# Patient Record
Sex: FEMALE | Race: WHITE | Employment: OTHER
[De-identification: names, ages, dates, MRNs, and addresses within clinical notes are randomized per-mention and may not be internally consistent; named-entity substitution may affect disease eponyms.]

---

## 2017-01-06 ENCOUNTER — HEALTH MAINTENANCE LETTER (OUTPATIENT)
Age: 57
End: 2017-01-06

## 2017-01-31 ENCOUNTER — OFFICE VISIT (OUTPATIENT)
Dept: FAMILY MEDICINE | Facility: CLINIC | Age: 57
End: 2017-01-31

## 2017-01-31 DIAGNOSIS — M70.41 PREPATELLAR BURSITIS OF RIGHT KNEE: Primary | ICD-10-CM

## 2017-01-31 DIAGNOSIS — S80.211A ABRASION, KNEE, RIGHT, INITIAL ENCOUNTER: ICD-10-CM

## 2017-01-31 LAB
% GRANULOCYTES: 64.3 %
HCT VFR BLD AUTO: 44.1 % (ref 35–47)
HEMOGLOBIN: 13.9 G/DL (ref 11.7–15.7)
LYMPHOCYTES NFR BLD AUTO: 26.4 %
MCH RBC QN AUTO: 28.8 PG (ref 26–33)
MCHC RBC AUTO-ENTMCNC: 31.5 G/DL (ref 31–36)
MCV RBC AUTO: 91.5 FL (ref 78–100)
MONOCYTES NFR BLD AUTO: 9.3 %
PLATELET COUNT - QUEST: 329 10^9/L (ref 150–375)
RBC # BLD AUTO: 4.82 10*12/L (ref 3.8–5.2)
WBC # BLD AUTO: 7.2 10*9/L (ref 4–11)

## 2017-01-31 PROCEDURE — 36415 COLL VENOUS BLD VENIPUNCTURE: CPT | Performed by: FAMILY MEDICINE

## 2017-01-31 PROCEDURE — 85025 COMPLETE CBC W/AUTO DIFF WBC: CPT | Performed by: FAMILY MEDICINE

## 2017-01-31 PROCEDURE — 99214 OFFICE O/P EST MOD 30 MIN: CPT | Performed by: FAMILY MEDICINE

## 2017-01-31 NOTE — Clinical Note
[facility]              [facility]  [place] [facility] 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 Swelling and tenderness noted after the activity. Improved today    Prior history of related problems: previous knee injury / hit it when she fell and scraped the skin. 1/21/2017    EXAMINATION:                      Skin: healing abrasion without erythema    Musculoskeletal: Knee is noted on the right for a 1+ bursitis with mild erythema. Palpation without evidence of warmth. ROM is full. No ligamentous instability. Patellar apprehension negative.  Neurologic: negative      CBC; WNL    Diagnosis: (M70.41) Prepatellar bursitis of right knee  (primary encounter diagnosis)  Ice prn. Rehab stretches/ exercises to begin immediately and given in writing with illustrations.  No kneeling for one month. No squatting    (S80.211A) Abrasion, knee, right, initial encounter  Plan: CL AFF HEMOGRAM/PLATE/DIFF (BFP), VENOUS         COLLECTION        Gentle continued skin care        Work Related:  yes    Lab Results: as above    The employee is ABLE to return to work today.    When the patient returns to work, the following restrictions apply until 1 month:  A) No weight on the right knee  B: No squatting      Other restrictions: None    FOLLOW-UP  No follow up is necessary.  Permanency rating 0 %.    Erika Cavanaugh MD      I authorize the release of medical information to be sent to my employer.  Employee Signature: _________________________________________  Date: ______________________________

## 2017-02-01 NOTE — NURSING NOTE
Wen is here for WC knee pain right knee    Pre-Visit Screening :  Immunizations : up to date  Colonoscopy : is up to date  Mammogram : is up to date  Asthma Action Test/Plan : NA  PHQ9/GAD7 :  NA  BP done on the left arm, with a reg sized cuff.  Pulse - regular  My Chart - accepts    CLASSIFICATION OF OVERWEIGHT AND OBESITY BY BMI                         Obesity Class           BMI(kg/m2)  Underweight                                    < 18.5  Normal                                         18.5-24.9  Overweight                                     25.0-29.9  OBESITY                     I                  30.0-34.9                              II                 35.0-39.9  EXTREME OBESITY             III                >40                             Patient's  BMI Body mass index is 23.6 kg/(m^2).  http://hin.nhlbi.nih.gov/menuplanner/menu.cgi  Questioned patient about current smoking habits.  Pt. has never smoked.

## 2017-02-01 NOTE — PROGRESS NOTES
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 Swelling and tenderness noted after the activity. Improved today    Prior history of related problems: previous knee injury / hit it when she fell and scraped the skin. 1/21/2017    EXAMINATION:                      Skin: healing abrasion without erythema    Musculoskeletal: Knee is noted on the right for a 1+ bursitis with mild erythema. Palpation without evidence of warmth. ROM is full. No ligamentous instability. Patellar apprehension negative.  Neurologic: negative      CBC; WNL    Diagnosis: (M70.41) Prepatellar bursitis of right knee  (primary encounter diagnosis)  Ice prn. Rehab stretches/ exercises to begin immediately and given in writing with illustrations.  No kneeling for one month. No squatting    (S80.211A) Abrasion, knee, right, initial encounter  Plan: CL AFF HEMOGRAM/PLATE/DIFF (BFP), VENOUS         COLLECTION        Gentle continued skin care        Work Related:  yes    Lab Results: as above    The employee is ABLE to return to work today.    When the patient returns to work, the following restrictions apply until 1 month:  A) No weight on the right knee  B: No squatting      Other restrictions: None    FOLLOW-UP  No follow up is necessary.  Permanency rating 0 %.    Erika Cavanaugh MD      I authorize the release of medical information to be sent to my employer.  Employee Signature: _________________________________________  Date: ______________________________

## 2017-02-08 VITALS
SYSTOLIC BLOOD PRESSURE: 110 MMHG | OXYGEN SATURATION: 97 % | WEIGHT: 139.6 LBS | RESPIRATION RATE: 16 BRPM | HEART RATE: 61 BPM | DIASTOLIC BLOOD PRESSURE: 78 MMHG | BODY MASS INDEX: 23.26 KG/M2 | HEIGHT: 65 IN | TEMPERATURE: 98.1 F

## 2017-02-14 ENCOUNTER — OFFICE VISIT (OUTPATIENT)
Dept: FAMILY MEDICINE | Facility: CLINIC | Age: 57
End: 2017-02-14

## 2017-02-14 VITALS
DIASTOLIC BLOOD PRESSURE: 84 MMHG | WEIGHT: 137 LBS | BODY MASS INDEX: 22.82 KG/M2 | HEIGHT: 65 IN | TEMPERATURE: 97.5 F | OXYGEN SATURATION: 97 % | HEART RATE: 49 BPM | SYSTOLIC BLOOD PRESSURE: 130 MMHG

## 2017-02-14 DIAGNOSIS — Z00.00 ROUTINE GENERAL MEDICAL EXAMINATION AT A HEALTH CARE FACILITY: Primary | ICD-10-CM

## 2017-02-14 DIAGNOSIS — Z11.59 NEED FOR HEPATITIS C SCREENING TEST: ICD-10-CM

## 2017-02-14 PROCEDURE — 99396 PREV VISIT EST AGE 40-64: CPT | Performed by: FAMILY MEDICINE

## 2017-02-14 PROCEDURE — 80061 LIPID PANEL: CPT | Mod: 90 | Performed by: FAMILY MEDICINE

## 2017-02-14 PROCEDURE — 86803 HEPATITIS C AB TEST: CPT | Mod: 90 | Performed by: FAMILY MEDICINE

## 2017-02-14 PROCEDURE — 80053 COMPREHEN METABOLIC PANEL: CPT | Mod: 90 | Performed by: FAMILY MEDICINE

## 2017-02-14 PROCEDURE — 36415 COLL VENOUS BLD VENIPUNCTURE: CPT | Performed by: FAMILY MEDICINE

## 2017-02-14 NOTE — PROGRESS NOTES
SUBJECTIVE:     CC: Wen Sanchez is an 56 year old woman who presents for preventive health visit.     Healthy Habits:    Do you get at least three servings of calcium containing foods daily (dairy, green leafy vegetables, etc.)? yes    Amount of exercise or daily activities, outside of work: 1 day(s) per week    Problems taking medications regularly No    Medication side effects: No    Have you had an eye exam in the past two years? yes    Do you see a dentist twice per year? yes    Do you have sleep apnea, excessive snoring or daytime drowsiness?no            Today's PHQ-2 Score:   PHQ-2 ( 1999 [employer]) 2/14/2017 11/5/2015   Q1: Little interest or pleasure in doing things 0 0   Q2: Feeling down, depressed or hopeless 0 0   PHQ-2 Score 0 0       Abuse: Current or Past(Physical, Sexual or Emotional)- No  Do you feel safe in your environment - Yes    Social History   Substance Use Topics     Smoking status: Never Smoker     Smokeless tobacco: Never Used     Alcohol use 2.0 oz/week     4 drink(s) per week      Comment: weekends      The patient does not drink >3 drinks per day nor >7 drinks per week.    Recent Labs   Lab Test  11/05/15   0918  08/29/13   0948   CHOL  230*  188   HDL  95  67   LDL  124  105   TRIG  57  79   CHOLHDLRATIO  2.4  2.8       Reviewed orders with patient.  Reviewed health maintenance and updated orders accordingly - Yes    Mammo Decision Support:  Patient over age 50, mutual decision to screen reflected in health maintenance.    Pertinent mammograms are reviewed under the imaging tab.  History of abnormal Pap smear: NO - age 30- 65 PAP every 3 years recommended  All Histories reviewed and updated in Epic.  Past Medical History   Diagnosis Date     Family history of diabetes mellitus      Mother and Father      Past Surgical History   Procedure Laterality Date     Test not found  10/2001     Normal GBUS except for liver hemangioma     C enlarge breast  9/01      ugi endoscopy diag w or  w/o brush/wash  2001     Dr. Brigette HUYNH mammogram, screening  2012     Eye exam established pt  2013     Colonoscopy  2010       ROS:  C: NEGATIVE for fever, chills, change in weight  I: NEGATIVE for worrisome rashes, moles or lesions  E: NEGATIVE for vision changes or irritation  ENT: NEGATIVE for ear, mouth and throat problems  R: NEGATIVE for significant cough or SOB  B: NEGATIVE for masses, tenderness or discharge  CV: NEGATIVE for chest pain, palpitations or peripheral edema  GI: NEGATIVE for nausea, abdominal pain, heartburn, or change in bowel habits  : NEGATIVE for unusual urinary or vaginal symptoms. No vaginal bleeding.  M: NEGATIVE for significant arthralgias or myalgia  N: NEGATIVE for weakness, dizziness or paresthesias  E: NEGATIVE for temperature intolerance, skin/hair changes  P: NEGATIVE for changes in mood or affect     Problem list, Medication list, Allergies, and Medical/Social/Surgical histories reviewed in [facility] and updated as appropriate.  Labs reviewed in EPIC  BP Readings from Last 3 Encounters:   02/14/17 130/84   01/31/17 110/78   06/23/16 126/84    Wt Readings from Last 3 Encounters:   02/14/17 62.1 kg (137 lb)   01/31/17 63.3 kg (139 lb 9.6 oz)   06/23/16 62 kg (136 lb 9.6 oz)                  Patient Active Problem List   Diagnosis     Panic disorder without agoraphobia     Esophageal reflux     Hypoglycemia     Undiagnosed cardiac murmurs     Calculus of kidney     Counseling for living will     Health Care Home     Encounter for counseling     ACP (advance care planning)     Symptomatic menopausal or female climacteric states     Past Surgical History   Procedure Laterality Date     Test not found  10/2001     Normal GBUS except for liver hemangioma     C enlarge breast  9/01      ugi endoscopy diag w or w/o brush/wash  2001     Dr. Brigette HUYNH mammogram, screening  2012     Eye exam established pt  2013     Colonoscopy  2010       Social History   Substance Use Topics     Smoking  "status: Never Smoker     Smokeless tobacco: Never Used     Alcohol use 2.0 oz/week     4 drink(s) per week      Comment: weekends      Family History   Problem Relation Age of Onset     DIABETES Mother      CANCER Sister      cervical     HEART DISEASE No family hx of      Lipids No family hx of      DIABETES Father      Breast Cancer No family hx of      Cancer - colorectal No family hx of          Current Outpatient Prescriptions   Medication Sig Dispense Refill     esomeprazole (NEXIUM) 40 MG capsule TAKE 1 CAPSULE EVERY  MORNING BEFORE BREAKFAST;  TAKE 30-60 MINUTES BEFORE  EATING. 90 capsule 3     EPI E-Z PEN QUIRINO 1:1000  IJ 1 TIME ONLY 1 2     Allergies   Allergen Reactions     Sesame Oil Anaphylaxis     Cephalosporins      Recent Labs   Lab Test  11/05/15   0918  08/29/13   0948  06/13/12   0848   05/19/11   0500  11/13/09   0400   LDL  124  105  91   < >   --    --    HDL  95  67  66   < >   --    --    TRIG  57  79  84   < >   --    --    ALT  20  21  20   < >   --    --    CR  0.73  0.69  0.76   < >   --    --    GFRESTIMATED  91  98  90   < >   --    --    POTASSIUM  4.1  4.4  4.0   < >   --    --    TSH   --    --    --    --   1.06  1.27    < > = values in this interval not displayed.      OBJECTIVE:     /84 (BP Location: Left arm, Cuff Size: Adult Regular)  Pulse (!) 49  Temp 97.5  F (36.4  C) (Oral)  Ht 1.638 m (5' 4.5\")  Wt 62.1 kg (137 lb)  LMP 10/14/2013  SpO2 97%  BMI 23.15 kg/m2  EXAM:  GENERAL: healthy, alert and no distress  EYES: Eyes grossly normal to inspection, PERRL and conjunctivae and sclerae normal  HENT: ear canals and TM's normal, nose and mouth without ulcers or lesions  NECK: no adenopathy, no asymmetry, masses, or scars and thyroid normal to palpation  RESP: lungs clear to auscultation - no rales, rhonchi or wheezes  BREAST: normal without masses, tenderness or nipple discharge and no palpable axillary masses or adenopathy  CV: regular rate and rhythm, normal S1 S2, no S3 or " "S4, no murmur, click or rub, no peripheral edema and peripheral pulses strong  ABDOMEN: soft, nontender, no hepatosplenomegaly, no masses and bowel sounds normal   (female): deferred  MS: no gross musculoskeletal defects noted, no edema  SKIN: no suspicious lesions or rashes  NEURO: Normal strength and tone, mentation intact and speech normal  PSYCH: mentation appears normal, affect normal/bright  LYMPH: no cervical, supraclavicular, axillary, or inguinal adenopathy    ASSESSMENT/PLAN:     (Z00.00) Routine general medical examination at a health care facility  (primary encounter diagnosis)  Comment: discussed preventitive healthcare   Plan: Lipid Profile ([facility]), COMPREHENSIVE METABOLIC         PANEL ([facility]) XCMP, VENOUS COLLECTION        Continue to work on healthy diet and exercise, discussed healthy habits     (Z11.59) Need for hepatitis C screening test  Comment:   Plan: Hepatits C antibody ([facility]), VENOUS COLLECTION              COUNSELING:   Reviewed preventive health counseling, as reflected in patient instructions       Regular exercise       Healthy diet/nutrition       Vision screening       Colon cancer screening       Consider Hep C screening for patients born between 1945 and 1965         reports that she has never smoked. She has never used smokeless tobacco.    Estimated body mass index is 23.15 kg/(m^2) as calculated from the following:    Height as of this encounter: 1.638 m (5' 4.5\").    Weight as of this encounter: 62.1 kg (137 lb).       Counseling Resources:  ATP IV Guidelines  Pooled Cohorts Equation Calculator  Breast Cancer Risk Calculator  FRAX Risk Assessment  ICSI Preventive Guidelines  Dietary Guidelines for Americans, 2010  Dot's MyPlate  ASA Prophylaxis  Lung CA Screening    Mike Beltran MD  [facility]  "

## 2017-02-14 NOTE — NURSING NOTE
"Wen Sanchez is here for a CPX. Fasting: Yes.    Pre-visit Planning  Immunizations -up to date  Colonoscopy -is up to date  Mammogram -is up to date  Asthma test --NA  PHQ2 -is completed today  CLAUDIA 7 -NA    Vitals:  BP Cuff left  Arm with regular Cuff  PULSE regular  137 lbs 0 oz and 5' 4.5\"  CLASSIFICATION OF OVERWEIGHT AND OBESITY BY BMI                        Obesity Class           BMI(kg/m2)  Underweight                                    < 18.5  Normal                                         18.5-24.9  Overweight                                     25.0-29.9  OBESITY                     I                  30.0-34.9                             II                 35.0-39.9  EXTREME OBESITY             III                >40      Patient's  BMI Body mass index is 23.15 kg/(m^2).  Http://hin.nhlbi.nih.gov/menuplanner/menu.cgi  Tobacco Use:  Questioned patient about current smoking habits.  Pt. has never smoked.  ETOH screening Questions:  1-How often do you have a drink containing alcohol?                             2 times per week(s)  2-How many drinks containing alcohol do you have on a typical day when you are         Drinking?                              2 to 3  3- How often do you have 5 or more drinks on one occasion?                              Never     Have you ever:  @None of the patient's responses to the CAGE screening were positive / Negative CAGE score@    Roomed by: Juany Ferguson CMA      "

## 2017-02-14 NOTE — MR AVS SNAPSHOT
After Visit Summary   2/14/2017    Wen Sanchez    MRN: [id number]           Patient Information     Date Of Birth          1960        Visit Information        Provider Department      2/14/2017 9:30 AM Mike Beltran MD [facility]        Today's Diagnoses     Routine general medical examination at a health care facility    -  1    Need for hepatitis C screening test           Follow-ups after your visit        Follow-up notes from your care team     Return in about 1 year (around 2/14/2018).      Who to contact     If you have questions or need follow up information about today's clinic visit or your schedule please contact [facility] directly at [phone].  Normal or non-critical lab and imaging results will be communicated to you by Zeviahart, letter or phone within 4 business days after the clinic has received the results. If you do not hear from us within 7 days, please contact the clinic through Zeviahart or phone. If you have a critical or abnormal lab result, we will notify you by phone as soon as possible.  Submit refill requests through "Carmolex," or call your pharmacy and they will forward the refill request to us. Please allow 3 business days for your refill to be completed.          Additional Information About Your Visit        MyChart Information     "Carmolex," gives you secure access to your electronic health record. If you see a primary care provider, you can also send messages to your care team and make appointments. If you have questions, please call your primary care clinic.  If you do not have a primary care provider, please call [phone] and they will assist you.        Care EveryWhere ID     This is your Care EveryWhere ID. This could be used by other organizations to access your [facility] medical records  [id number]        Your Vitals Were     Pulse Temperature Height Last Period Pulse Oximetry BMI (Body Mass  "Index)    49 97.5  F (36.4  C) (Oral) 1.638 m (5' 4.5\") 10/14/2013 97% 23.15 kg/m2       Blood Pressure from Last 3 Encounters:   02/14/17 130/84   01/31/17 110/78   06/23/16 126/84    Weight from Last 3 Encounters:   02/14/17 62.1 kg (137 lb)   01/31/17 63.3 kg (139 lb 9.6 oz)   06/23/16 62 kg (136 lb 9.6 oz)              We Performed the Following     COMPREHENSIVE METABOLIC PANEL ([facility]) XCMP     Hepatits C antibody ([facility])     Lipid Profile ([facility])     VENOUS COLLECTION        Primary Care Provider Office Phone # Fax #    Mike Beltran [phone]       [facility] [address]  [facility] [address]        Thank you!     Thank you for choosing [facility]  for your care. Our goal is always to provide you with excellent care. Hearing back from our patients is one way we can continue to improve our services. Please take a few minutes to complete the written survey that you may receive in the mail after your visit with us. Thank you!             Your Updated Medication List - Protect others around you: Learn how to safely use, store and throw away your medicines at www.disposemymeds.org.          This list is accurate as of: 2/14/17 10:06 AM.  Always use your most recent med list.                   Brand Name Dispense Instructions for use    EPI E-Z PEN QUIRINO 1:1000  IJ     1    1 TIME ONLY       esomeprazole 40 MG CR capsule    nexIUM    90 capsule    TAKE 1 CAPSULE EVERY  MORNING BEFORE BREAKFAST;  TAKE 30-60 MINUTES BEFORE  EATING.         "

## 2017-02-15 LAB
ALBUMIN SERPL-MCNC: 4.4 G/DL (ref 3.6–5.1)
ALBUMIN/GLOB SERPL: 1.4 (CALC) (ref 1–2.5)
ALP SERPL-CCNC: 96 U/L (ref 33–130)
ALT SERPL-CCNC: 17 U/L (ref 6–29)
AST SERPL-CCNC: 18 U/L (ref 10–35)
BILIRUB SERPL-MCNC: 0.5 MG/DL (ref 0.2–1.2)
BUN SERPL-MCNC: 26 MG/DL (ref 7–25)
BUN/CREATININE RATIO: 36 (CALC) (ref 6–22)
CALCIUM SERPL-MCNC: 9.4 MG/DL (ref 8.6–10.4)
CHLORIDE SERPLBLD-SCNC: 105 MMOL/L (ref 98–110)
CHOLEST SERPL-MCNC: 229 MG/DL (ref 125–200)
CHOLEST/HDLC SERPL: 2.5 (CALC)
CO2 SERPL-SCNC: 22 MMOL/L (ref 20–31)
CREAT SERPL-MCNC: 0.72 MG/DL (ref 0.5–1.05)
EGFR AFRICAN AMERICAN - QUEST: 107 ML/MIN/1.73M2
GFR SERPL CREATININE-BSD FRML MDRD: 92 ML/MIN/1.73M2
GLOBULIN, CALCULATED - QUEST: 3.1 G/DL (CALC) (ref 1.9–3.7)
GLUCOSE - QUEST: 77 MG/DL (ref 65–99)
HCV AB - QUEST: NORMAL
HDLC SERPL-MCNC: 90 MG/DL
LDLC SERPL CALC-MCNC: 127 MG/DL (CALC)
NONHDLC SERPL-MCNC: 139 MG/DL (CALC)
POTASSIUM SERPL-SCNC: 4 MMOL/L (ref 3.5–5.3)
PROT SERPL-MCNC: 7.5 G/DL (ref 6.1–8.1)
SIGNAL TO CUT OFF - QUEST: 0.03
SODIUM SERPL-SCNC: 140 MMOL/L (ref 135–146)
TRIGL SERPL-MCNC: 62 MG/DL

## 2017-02-25 ENCOUNTER — APPOINTMENT (OUTPATIENT)
Dept: GENERAL RADIOLOGY | Facility: CLINIC | Age: 57
End: 2017-02-25
Attending: EMERGENCY MEDICINE
Payer: COMMERCIAL

## 2017-02-25 ENCOUNTER — HOSPITAL ENCOUNTER (EMERGENCY)
Facility: CLINIC | Age: 57
Discharge: HOME | End: 2017-02-25
Attending: EMERGENCY MEDICINE | Admitting: EMERGENCY MEDICINE
Payer: COMMERCIAL

## 2017-02-25 VITALS
SYSTOLIC BLOOD PRESSURE: 143 MMHG | HEIGHT: 64 IN | WEIGHT: 137 LBS | HEART RATE: 91 BPM | BODY MASS INDEX: 23.39 KG/M2 | DIASTOLIC BLOOD PRESSURE: 93 MMHG | RESPIRATION RATE: 18 BRPM | OXYGEN SATURATION: 99 % | TEMPERATURE: 99.1 F

## 2017-02-25 DIAGNOSIS — R07.9 CHEST PAIN, UNSPECIFIED TYPE: ICD-10-CM

## 2017-02-25 LAB
ANION GAP SERPL CALCULATED.3IONS-SCNC: 11 MMOL/L (ref 3–14)
BASOPHILS # BLD AUTO: 0 10E9/L (ref 0–0.2)
BASOPHILS NFR BLD AUTO: 0.1 %
BUN SERPL-MCNC: 18 MG/DL (ref 7–30)
CALCIUM SERPL-MCNC: 9.1 MG/DL (ref 8.5–10.1)
CHLORIDE SERPL-SCNC: 108 MMOL/L (ref 94–109)
CO2 SERPL-SCNC: 24 MMOL/L (ref 20–32)
CREAT SERPL-MCNC: 0.67 MG/DL (ref 0.52–1.04)
D DIMER PPP FEU-MCNC: NORMAL UG/ML FEU (ref 0–0.5)
DIFFERENTIAL METHOD BLD: NORMAL
EOSINOPHIL # BLD AUTO: 0 10E9/L (ref 0–0.7)
EOSINOPHIL NFR BLD AUTO: 0.6 %
ERYTHROCYTE [DISTWIDTH] IN BLOOD BY AUTOMATED COUNT: 12.2 % (ref 10–15)
GFR SERPL CREATININE-BSD FRML MDRD: 90 ML/MIN/1.7M2
GLUCOSE SERPL-MCNC: 88 MG/DL (ref 70–99)
HCT VFR BLD AUTO: 43.7 % (ref 35–47)
HGB BLD-MCNC: 14.9 G/DL (ref 11.7–15.7)
IMM GRANULOCYTES # BLD: 0 10E9/L (ref 0–0.4)
IMM GRANULOCYTES NFR BLD: 0.1 %
LYMPHOCYTES # BLD AUTO: 1.9 10E9/L (ref 0.8–5.3)
LYMPHOCYTES NFR BLD AUTO: 26 %
MCH RBC QN AUTO: 29.9 PG (ref 26.5–33)
MCHC RBC AUTO-ENTMCNC: 34.1 G/DL (ref 31.5–36.5)
MCV RBC AUTO: 88 FL (ref 78–100)
MONOCYTES # BLD AUTO: 0.3 10E9/L (ref 0–1.3)
MONOCYTES NFR BLD AUTO: 3.8 %
NEUTROPHILS # BLD AUTO: 4.9 10E9/L (ref 1.6–8.3)
NEUTROPHILS NFR BLD AUTO: 69.4 %
NRBC # BLD AUTO: 0 10*3/UL
NRBC BLD AUTO-RTO: 0 /100
PLATELET # BLD AUTO: 307 10E9/L (ref 150–450)
POTASSIUM SERPL-SCNC: 3.8 MMOL/L (ref 3.4–5.3)
RBC # BLD AUTO: 4.99 10E12/L (ref 3.8–5.2)
SODIUM SERPL-SCNC: 143 MMOL/L (ref 133–144)
WBC # BLD AUTO: 7.1 10E9/L (ref 4–11)

## 2017-02-25 PROCEDURE — 85379 FIBRIN DEGRADATION QUANT: CPT | Performed by: EMERGENCY MEDICINE

## 2017-02-25 PROCEDURE — 85025 COMPLETE CBC W/AUTO DIFF WBC: CPT | Performed by: EMERGENCY MEDICINE

## 2017-02-25 PROCEDURE — 80048 BASIC METABOLIC PNL TOTAL CA: CPT | Performed by: EMERGENCY MEDICINE

## 2017-02-25 PROCEDURE — 71020 XR CHEST 2 VW: CPT

## 2017-02-25 PROCEDURE — 25000132 ZZH RX MED GY IP 250 OP 250 PS 637: Performed by: EMERGENCY MEDICINE

## 2017-02-25 PROCEDURE — 99285 EMERGENCY DEPT VISIT HI MDM: CPT | Mod: 25

## 2017-02-25 PROCEDURE — 93005 ELECTROCARDIOGRAM TRACING: CPT

## 2017-02-25 RX ORDER — NITROGLYCERIN 0.4 MG/1
0.4 TABLET SUBLINGUAL EVERY 5 MIN PRN
Status: DISCONTINUED | OUTPATIENT
Start: 2017-02-25 | End: 2017-02-25 | Stop reason: HOSPADM

## 2017-02-25 RX ORDER — ACETAMINOPHEN 500 MG
1000 TABLET ORAL ONCE
Status: COMPLETED | OUTPATIENT
Start: 2017-02-25 | End: 2017-02-25

## 2017-02-25 RX ADMIN — ACETAMINOPHEN 1000 MG: 500 TABLET, FILM COATED ORAL at 15:47

## 2017-02-25 ASSESSMENT — ENCOUNTER SYMPTOMS
VOMITING: 0
ABDOMINAL PAIN: 0
NAUSEA: 0
NUMBNESS: 1
FEVER: 0
DIAPHORESIS: 0
PALPITATIONS: 0
SHORTNESS OF BREATH: 1

## 2017-02-25 NOTE — ED NOTES
Pt educated on Tylenol. Pt resting in cot quietly, does report she had a few second episode of the chest pain but denies it at this time.

## 2017-02-25 NOTE — ED NOTES
Pt back from imaging, placed on monitors, respirations equal and unlabored. Pt reports no chest pain at this time.

## 2017-02-25 NOTE — ED NOTES
Pt presents to ED reporting for the past week she has been having sharp left breast pain, states it comes and goes, when the pain starts in slowly increases in intensity, at this time pt denies pain. Reports the pain does not last long. Pt reports left jaw tingling, states also episodes where her upper back has hurt but does not know if this is related to her job or not.  Pt states feeling a little SOB today. Denies any cardiac hx. Reports GERD in the past. ABCs intact. A/OX3

## 2017-02-25 NOTE — DISCHARGE INSTRUCTIONS
Discharge Instructions  Chest Pain    You have been seen today for chest pain or discomfort.  At this time, your doctor has found no signs that your chest pain is due to a serious or life-threatening condition, (or you have declined more testing and/or admission to the hospital). However, sometimes there is a serious problem that does not show up right away. Your evaluation today may not be complete and you may need further testing and evaluation.     You need to follow-up with your regular doctor within 3 days.    Return to the Emergency Department if:    Your chest pain changes, gets worse, starts to happen more often, or comes with less activity.    You are short of breath.    You get very weak or tired.    You pass out or faint.    You have any new symptoms, like fever, cough, numb legs, or you cough up blood.    You have anything else that worries you.    Until you follow-up with your regular doctor please do the following:    Take one aspirin daily unless you have an allergy or are told not to by your doctor.    If a stress test appointment has been made, go to the appointment.    If you have questions, contact your regular doctor.    If your doctor today has told you to follow-up with your regular doctor, it is very important that you make an appointment with your clinic and go to the appointment.  If you do not follow-up with your primary doctor, it may result in missing an important development which could result in permanent injury or disability and/or lasting pain.  If there is any problem keeping your appointment, call your doctor or return to the Emergency Department.    If you were given a prescription for medicine here today, be sure to read all of the information (including the package insert) that comes with your prescription.  This will include important information about the medicine, its side effects, and any warnings that you need to know about.  The pharmacist who fills the prescription can  provide more information and answer questions you may have about the medicine.  If you have questions or concerns that the pharmacist cannot address, please call or return to the Emergency Department.         Remember that you can always come back to the Emergency Department if you are not able to see your regular doctor in the amount of time listed above, if you get any new symptoms, or if there is anything that worries you.

## 2017-02-25 NOTE — ED PROVIDER NOTES
History     Chief Complaint:  Chest pain     HPI   Wen Sanchez is a 56 year old female who presents with waxing and waning sharp left sided chest pain ongoing for the last week. Today the patient acutely developed left arm numbness with associated left jaw tingling and shortness of breath that lasted for approximately 1 minute in duration. Due to these new symptoms and her ongoing sporadic chest pain she elected to come to the emergency department for further evaluation. On evaluation, she denies any chest pain. The patient does not report any cough, fevers, diaphoresis, or other related symptoms. She voices no other concerns at this time.     Cardiac/PE/DVT Risk Factors:  The patient has no history of hypertension, hyperlipidemia, diabetes, or smoking. She reports no family history of heart disease. The patient does have any personal or familial history of PE, DVT, or clotting disorder. The patient reports no recent travel, surgery, or other immobilizations.     Allergies:  Cephalosporins      Medications:    Nexium     Past Medical History:    GERD   Calculus of kidney   Hypoglycemia   Panic disorder     Past Surgical History:    Endoscopy    Colonoscopy     Family History:    Diabetes (mother)  Cancer (sister)    Social History:  The patient is not a smoker. The patient uses alcohol on weekends.   Marital Status:   [2]     Review of Systems   Constitutional: Negative for diaphoresis and fever.   Respiratory: Positive for shortness of breath.    Cardiovascular: Positive for chest pain. Negative for palpitations and leg swelling.   Gastrointestinal: Negative for abdominal pain, nausea and vomiting.   Neurological: Positive for numbness.   All other systems reviewed and are negative.      Physical Exam     Patient Vitals for the past 24 hrs:   BP Temp Temp src Pulse Heart Rate Resp SpO2 Height Weight   02/25/17 1708 - - - - - 18 - - -   02/25/17 1645 - - - - - - 99 % - -   02/25/17 1615 (!) 143/93 -  "- - 59 - 98 % - -   02/25/17 1600 (!) 152/98 - - - 72 - 97 % - -   02/25/17 1545 (!) 145/99 - - - 78 - 97 % - -   02/25/17 1530 (!) 164/98 - - - 83 - 99 % - -   02/25/17 1520 (!) 176/123 99.1  F (37.3  C) Oral 91 - 18 99 % - -   02/25/17 1516 - - - - - - - 1.626 m (5' 4\") 62.1 kg (137 lb)           Physical Exam  Constitutional: Alert, attentive  HENT:    Nose: Nose normal.    Mouth/Throat: Oropharynx is clear, mucous membranes are moist   Eyes: EOM are normal. Pupils are equal, round, and reactive to light.   CV: regular rate and rhythm; no murmurs, rubs or gallups  Chest: Effort normal and breath sounds normal.   GI:  There is no tenderness. No distension. Normal bowel sounds  MSK: Normal range of motion.   Neurological: Alert, attentive  Skin: Skin is warm and dry.      Emergency Department Course   ECG (15:18:27):  Rate 96 bpm. NM interval 142. QRS duration 92. QT/QTc 346/437. P-R-T axes 62/31/60. Normal sinus rhythm incomplete right bundle branch block cannot rule out anteroseptal infarct age undetermined abnormal ECG. Interpreted at 1520 by Manuel Solano MD.     Imaging:  XR Chest: negative. Readings per radiology.     Laboratory:  CBC: WNL (WBC 7.1, HGB 14.9, )   BMP: WNL (Creatinine 0.67)  D-Dimer: <0.3 (WNL)    Interventions:  1526 Tylenol 1,000 mg PO     Emergency Department Course:  Past medical records, nursing notes, and vitals reviewed. I performed an exam of the patient and obtained history, as documented above. Blood was obtained.      Stress echocardiogram scheduled for Monday at 11a.     Findings and plan explained to the Patient. Patient discharged home with instructions regarding supportive care, medications, and reasons to return. The importance of close follow-up was reviewed. An out patient stress test was scheduled.     Impression & Plan      Medical Decision Making:  Wen Sanchez is a 56 year old female with no significant past medical history who presents for evaluation " of atypical chest pain. It has been ongoing for several days and has been fleeting, and at rest. D-Dimer was negative essentially ruling out PE. Given long standing and atypical symptoms, her negative EKG and troponin essentially rule out AMI. Chest x-ray shows no widening mediastinum, pneumothorax or pneumonia. I recommended out patient stress testing Monday, she is scheduled for 11 AM. I advised strict return precautions for pain, fever, vomiting, or any other concerning symptoms.      Diagnosis:    ICD-10-CM    1. Chest pain, unspecified type R07.9 Exercise Stress Echocardiogram        Disposition:  Home in improved condition      Manuel Solano MD  [facility] Emergency Department    I, Lilia Coates, am serving as a scribe at 3:17 PM on 2/25/2017 to document services personally performed by Manuel Solano MD based on my observations and the provider's statements to me.           Manuel Solano MD  02/25/17 [id number]

## 2017-02-25 NOTE — ED AVS SNAPSHOT
[facility] Emergency Department    [address]    [facility] [address]    Phone:  [phone]    Fax:  [phone]                                       Wen Sanchez   MRN: [id number]    Department:  [facility] Emergency Department   Date of Visit:  2/25/2017           Patient Information     Date Of Birth          1960        Your diagnoses for this visit were:     Chest pain, unspecified type        You were seen by Manuel Solano MD.      Follow-up Information     Follow up with Mike Beltran MD In 2 days.    Specialty:  Family Practice    Contact information:    [facility]  [address] [facility] [address]  [facility] [address]  [phone]          Follow up with [facility].    Specialty:  Cardiology    Why:  Monday at 11a for stress test    Contact information:    [address] [facility] [id number] [phone]    Additional information:    [facility] is located at [facility] [address] [place]   Please check-in at the Front Main Lobby of the hospital at the Information Desk. You will then be directed to Cardiopulmonary.        Discharge Instructions       Discharge Instructions  Chest Pain    You have been seen today for chest pain or discomfort.  At this time, your doctor has found no signs that your chest pain is due to a serious or life-threatening condition, (or you have declined more testing and/or admission to the hospital). However, sometimes there is a serious problem that does not show up right away. Your evaluation today may not be complete and you may need further testing and evaluation.     You need to follow-up with your regular doctor within 3 days.    Return to the Emergency Department if:    Your chest pain changes, gets worse, starts to happen more often, or comes with less activity.    You are short of breath.    You get very weak or tired.    You  pass out or faint.    You have any new symptoms, like fever, cough, numb legs, or you cough up blood.    You have anything else that worries you.    Until you follow-up with your regular doctor please do the following:    Take one aspirin daily unless you have an allergy or are told not to by your doctor.    If a stress test appointment has been made, go to the appointment.    If you have questions, contact your regular doctor.    If your doctor today has told you to follow-up with your regular doctor, it is very important that you make an appointment with your clinic and go to the appointment.  If you do not follow-up with your primary doctor, it may result in missing an important development which could result in permanent injury or disability and/or lasting pain.  If there is any problem keeping your appointment, call your doctor or return to the Emergency Department.    If you were given a prescription for medicine here today, be sure to read all of the information (including the package insert) that comes with your prescription.  This will include important information about the medicine, its side effects, and any warnings that you need to know about.  The pharmacist who fills the prescription can provide more information and answer questions you may have about the medicine.  If you have questions or concerns that the pharmacist cannot address, please call or return to the Emergency Department.         Remember that you can always come back to the Emergency Department if you are not able to see your regular doctor in the amount of time listed above, if you get any new symptoms, or if there is anything that worries you.          24 Hour Appointment Hotline       To make an appointment at any [facility], call [phone] ([phone]). If you don't have a family doctor or clinic, we will help you find one. [facility] are conveniently located to serve the needs of you and your family.          ED  Discharge Orders     Exercise Stress Echocardiogram                    Review of your medicines      Our records show that you are taking the medicines listed below. If these are incorrect, please call your family doctor or clinic.        Dose / Directions Last dose taken    EPI E-Z PEN QUIRINO 1:1000  IJ   Quantity:  1        1 TIME ONLY   Refills:  2        esomeprazole 40 MG CR capsule   Commonly known as:  nexIUM   Quantity:  90 capsule        TAKE 1 CAPSULE EVERY  MORNING BEFORE BREAKFAST;  TAKE 30-60 MINUTES BEFORE  EATING.   Refills:  3                Procedures and tests performed during your visit     Basic metabolic panel (BMP)    CBC + differential    D dimer quantitative    EKG 12 lead    XR Chest 2 Views      Orders Needing Specimen Collection     None      Pending Results     Date and Time Order Name Status Description    2/25/2017 1610 XR Chest 2 Views Preliminary             Pending Culture Results     No orders found from 2/23/2017 to 2/26/2017.             Test Results from your hospital stay     2/25/2017  3:55 PM - Interface, [place] Results      Component Results     Component Value Ref Range & Units Status    WBC 7.1 4.0 - 11.0 10e9/L Final    RBC Count 4.99 3.8 - 5.2 10e12/L Final    Hemoglobin 14.9 11.7 - 15.7 g/dL Final    Hematocrit 43.7 35.0 - 47.0 % Final    MCV 88 78 - 100 fl Final    MCH 29.9 26.5 - 33.0 pg Final    MCHC 34.1 31.5 - 36.5 g/dL Final    RDW 12.2 10.0 - 15.0 % Final    Platelet Count 307 150 - 450 10e9/L Final    Diff Method Automated Method  Final    % Neutrophils 69.4 % Final    % Lymphocytes 26.0 % Final    % Monocytes 3.8 % Final    % Eosinophils 0.6 % Final    % Basophils 0.1 % Final    % Immature Granulocytes 0.1 % Final    Nucleated RBCs 0 0 /100 Final    Absolute Neutrophil 4.9 1.6 - 8.3 10e9/L Final    Absolute Lymphocytes 1.9 0.8 - 5.3 10e9/L Final    Absolute Monocytes 0.3 0.0 - 1.3 10e9/L Final    Absolute Eosinophils 0.0 0.0 - 0.7 10e9/L Final    Absolute  Basophils 0.0 0.0 - 0.2 10e9/L Final    Abs Immature Granulocytes 0.0 0 - 0.4 10e9/L Final    Absolute Nucleated RBC 0.0  Final         2/25/2017  3:55 PM - Interface, Flexilab Results      Component Results     Component Value Ref Range & Units Status    Sodium 143 133 - 144 mmol/L Final    Potassium 3.8 3.4 - 5.3 mmol/L Final    Chloride 108 94 - 109 mmol/L Final    Carbon Dioxide 24 20 - 32 mmol/L Final    Anion Gap 11 3 - 14 mmol/L Final    Glucose 88 70 - 99 mg/dL Final    Urea Nitrogen 18 7 - 30 mg/dL Final    Creatinine 0.67 0.52 - 1.04 mg/dL Final    GFR Estimate 90 >60 mL/min/1.7m2 Final    Non  GFR Calc    GFR Estimate If Black >90   GFR Calc   >60 mL/min/1.7m2 Final    Calcium 9.1 8.5 - 10.1 mg/dL Final         2/25/2017  3:57 PM - Interface, Flexilab Results      Component Results     Component Value Ref Range & Units Status    D Dimer  0.0 - 0.50 ug/ml FEU Final    <0.3  This D-dimer assay is intended for use in conjuntion with a clinical pretest   probability assessment model to exclude pulmonary embolism (PE) and as an aid   in the diagnosis of deep venous thrombosis (DVT) in outpatients suspected of PE   or DVT. The cut-off value is 0.5 g/mL FEU.           2/25/2017  4:39 PM - Interface, Radiant Ib      Narrative     CHEST TWO VIEW   2/25/2017 4:31 PM     HISTORY: Dyspnea.    COMPARISON: None.    FINDINGS: Heart size normal. Lungs clear.        Impression     IMPRESSION: Negative.                Clinical Quality Measure: Blood Pressure Screening     Your blood pressure was checked while you were in the emergency department today. The last reading we obtained was  BP: (!) 152/98 . Please read the guidelines below about what these numbers mean and what you should do about them.  If your systolic blood pressure (the top number) is less than 120 and your diastolic blood pressure (the bottom number) is less than 80, then your blood pressure is normal. There is nothing more  that you need to do about it.  If your systolic blood pressure (the top number) is 120-139 or your diastolic blood pressure (the bottom number) is 80-89, your blood pressure may be higher than it should be. You should have your blood pressure rechecked within a year by a primary care provider.  If your systolic blood pressure (the top number) is 140 or greater or your diastolic blood pressure (the bottom number) is 90 or greater, you may have high blood pressure. High blood pressure is treatable, but if left untreated over time it can put you at risk for heart attack, stroke, or kidney failure. You should have your blood pressure rechecked by a primary care provider within the next 4 weeks.  If your provider in the emergency department today gave you specific instructions to follow-up with your doctor or provider even sooner than that, you should follow that instruction and not wait for up to 4 weeks for your follow-up visit.        Thank you for choosing [facility]       Thank you for choosing [facility] for your care. Our goal is always to provide you with excellent care. Hearing back from our patients is one way we can continue to improve our services. Please take a few minutes to complete the written survey that you may receive in the mail after you visit with us. Thank you!        [facility] Information     [facility] gives you secure access to your electronic health record. If you see a primary care provider, you can also send messages to your care team and make appointments. If you have questions, please call your primary care clinic.  If you do not have a primary care provider, please call [phone] and they will assist you.        Care EveryWhere ID     This is your Care EveryWhere ID. This could be used by other organizations to access your [facility] medical records  [id number]        After Visit Summary       This is your record. Keep this with you and show to your community pharmacist(s) and doctor(s) at your  next visit.

## 2017-02-25 NOTE — ED NOTES
D/c instructions reviewed with pt educated to follow-up with pcp given information on stress test for Monday.

## 2017-02-27 ENCOUNTER — HOSPITAL ENCOUNTER (OUTPATIENT)
Dept: CARDIOLOGY | Facility: CLINIC | Age: 57
Discharge: HOME | End: 2017-02-27
Attending: EMERGENCY MEDICINE | Admitting: EMERGENCY MEDICINE
Payer: COMMERCIAL

## 2017-02-27 DIAGNOSIS — R07.9 CHEST PAIN, UNSPECIFIED TYPE: ICD-10-CM

## 2017-02-27 LAB — INTERPRETATION ECG - MUSE: NORMAL

## 2017-02-27 PROCEDURE — 93016 CV STRESS TEST SUPVJ ONLY: CPT | Performed by: INTERNAL MEDICINE

## 2017-02-27 PROCEDURE — 93018 CV STRESS TEST I&R ONLY: CPT | Performed by: INTERNAL MEDICINE

## 2017-02-27 PROCEDURE — 93321 DOPPLER ECHO F-UP/LMTD STD: CPT | Mod: 26 | Performed by: INTERNAL MEDICINE

## 2017-02-27 PROCEDURE — 93350 STRESS TTE ONLY: CPT | Mod: TC

## 2017-02-27 PROCEDURE — 93325 DOPPLER ECHO COLOR FLOW MAPG: CPT | Mod: 26 | Performed by: INTERNAL MEDICINE

## 2017-02-27 PROCEDURE — 93350 STRESS TTE ONLY: CPT | Mod: 26 | Performed by: INTERNAL MEDICINE

## 2017-03-08 ENCOUNTER — TELEPHONE (OUTPATIENT)
Dept: FAMILY MEDICINE | Facility: CLINIC | Age: 57
End: 2017-03-08

## 2017-03-08 NOTE — TELEPHONE ENCOUNTER
Medical records request from [facility] [employer]. Records faxed 3/8/17 at n/c

## 2017-06-20 ENCOUNTER — HOSPITAL ENCOUNTER (OUTPATIENT)
Dept: MAMMOGRAPHY | Facility: CLINIC | Age: 57
Discharge: HOME | End: 2017-06-20
Attending: FAMILY MEDICINE | Admitting: FAMILY MEDICINE
Payer: COMMERCIAL

## 2017-06-20 DIAGNOSIS — Z12.31 VISIT FOR SCREENING MAMMOGRAM: ICD-10-CM

## 2017-06-20 PROCEDURE — G0202 SCR MAMMO BI INCL CAD: HCPCS

## 2017-06-29 DIAGNOSIS — K21.9 GASTROESOPHAGEAL REFLUX DISEASE WITHOUT ESOPHAGITIS: ICD-10-CM

## 2017-06-29 RX ORDER — ESOMEPRAZOLE MAGNESIUM 40 MG/1
CAPSULE, DELAYED RELEASE ORAL
Qty: 90 CAPSULE | Refills: 2 | Status: SHIPPED | OUTPATIENT
Start: 2017-06-29 | End: 2018-04-05

## 2017-06-29 NOTE — TELEPHONE ENCOUNTER
Wen Sanchez is requesting a refill of:    Pending Prescriptions:                       Disp   Refills    esomeprazole (NEXIUM) 40 MG CR capsule [P*90 cap*2            Sig: TAKE 1 CAPSULE EVERY       MORNING BEFORE           BREAKFAST;  TAKE 30-60 MINUTES BEFORE  EATING    Please close encounter if RX was sent. Thanks, Kelle

## 2017-08-16 ENCOUNTER — OFFICE VISIT (OUTPATIENT)
Dept: FAMILY MEDICINE | Facility: CLINIC | Age: 57
End: 2017-08-16

## 2017-08-16 VITALS
WEIGHT: 134.8 LBS | OXYGEN SATURATION: 98 % | BODY MASS INDEX: 23.01 KG/M2 | TEMPERATURE: 98.5 F | HEART RATE: 72 BPM | SYSTOLIC BLOOD PRESSURE: 120 MMHG | HEIGHT: 64 IN | DIASTOLIC BLOOD PRESSURE: 74 MMHG

## 2017-08-16 DIAGNOSIS — R10.11 RUQ ABDOMINAL PAIN: Primary | ICD-10-CM

## 2017-08-16 LAB
% GRANULOCYTES: 57.9 %
HCT VFR BLD AUTO: 43.6 % (ref 35–47)
HEMOGLOBIN: 14 G/DL (ref 11.7–15.7)
LYMPHOCYTES NFR BLD AUTO: 34.8 %
MCH RBC QN AUTO: 30.2 PG (ref 26–33)
MCHC RBC AUTO-ENTMCNC: 32.1 G/DL (ref 31–36)
MCV RBC AUTO: 94 FL (ref 78–100)
MONOCYTES NFR BLD AUTO: 7.3 %
PLATELET COUNT - QUEST: 269 10^9/L (ref 150–375)
RBC # BLD AUTO: 4.64 10*12/L (ref 3.8–5.2)
WBC # BLD AUTO: 6.1 10*9/L (ref 4–11)

## 2017-08-16 PROCEDURE — 99214 OFFICE O/P EST MOD 30 MIN: CPT | Performed by: FAMILY MEDICINE

## 2017-08-16 PROCEDURE — 36415 COLL VENOUS BLD VENIPUNCTURE: CPT | Performed by: FAMILY MEDICINE

## 2017-08-16 PROCEDURE — 85025 COMPLETE CBC W/AUTO DIFF WBC: CPT | Performed by: FAMILY MEDICINE

## 2017-08-16 PROCEDURE — 80053 COMPREHEN METABOLIC PANEL: CPT | Mod: 90 | Performed by: FAMILY MEDICINE

## 2017-08-16 NOTE — NURSING NOTE
Wen Sanchez is here today for intermittent RUQ Abdominal Pain for the past 4 weeks.    Pre-Visit Screening :  Immunizations : up to date  Colonoscopy : is up to date (10/11/2010)  Mammogram : is up to date (06/20/2017)  Asthma Action Test/Plan : NA  PHQ9/GAD7 :  Up to date    Pulse - regular  My Chart - accepts    CLASSIFICATION OF OVERWEIGHT AND OBESITY BY BMI                         Obesity Class           BMI(kg/m2)  Underweight                                    < 18.5  Normal                                         18.5-24.9  Overweight                                     25.0-29.9  OBESITY                     I                  30.0-34.9                              II                 35.0-39.9  EXTREME OBESITY             III                >40                             Patient's  BMI Body mass index is 22.96 kg/(m^2).  http://hin.nhlbi.nih.gov/menuplanner/menu.cgi  Questioned patient about current smoking habits.  Pt. has never smoked.    Juany Ferguson, CMA

## 2017-08-16 NOTE — PROGRESS NOTES
SUBJECTIVE:                                                    Wen Sanchez is a 56 year old female who presents to clinic today for the following health issues:        ABDOMINAL   PAIN     Onset: 2 weeks    Description:   Character: Dull ache  Location: right upper quadrant  Radiation: Back    Intensity: moderate    Progression of Symptoms:  intermittent and waxing and waning    Accompanying Signs & Symptoms:  Fever/Chills?: no   Gas/Bloating: YES  Nausea: no   Vomitting: no   Diarrhea?: no   Constipation:YES but not worse-uses metamucil  Dysuria or Hematuria: YES   History:   Trauma: no   Previous similar pain: YES- years ago-had US to check gallbladder and it was OK   Previous tests done: none    Precipitating factors:   Does the pain change with:     Food: not really    BM: no     Urination: no     Alleviating factors:  none    Therapies Tried and outcome: alcohol, seemed to help    LMP:  not applicable             Problem list and histories reviewed & adjusted, as indicated.  Additional history: as documented    Patient Active Problem List   Diagnosis     Panic disorder without agoraphobia     Esophageal reflux     Hypoglycemia     Undiagnosed cardiac murmurs     Calculus of kidney     Counseling for living will     Health Care Home     Encounter for counseling     ACP (advance care planning)     Symptomatic menopausal or female climacteric states     Past Surgical History:   Procedure Laterality Date     C ENLARGE BREAST  9/01     C MAMMOGRAM, SCREENING  2012     COLONOSCOPY  2010     EYE EXAM ESTABLISHED PT  2013     HC UGI ENDOSCOPY DIAG W OR W/O BRUSH/WASH  2001    Dr. Machuca     TEST NOT FOUND  10/2001    Normal GBUS except for liver hemangioma       Social History   Substance Use Topics     Smoking status: Never Smoker     Smokeless tobacco: Never Used     Alcohol use 2.0 oz/week     4 Standard drinks or equivalent per week      Comment: weekends      Family History   Problem Relation Age of Onset  "    DIABETES Mother      CANCER Sister      cervical     HEART DISEASE No family hx of      Lipids No family hx of      DIABETES Father      Breast Cancer No family hx of      Cancer - colorectal No family hx of          Current Outpatient Prescriptions   Medication Sig Dispense Refill     esomeprazole (NEXIUM) 40 MG CR capsule TAKE 1 CAPSULE EVERY       MORNING BEFORE BREAKFAST;  TAKE 30-60 MINUTES BEFORE  EATING 90 capsule 2     EPI E-Z PEN QUIRINO 1:1000  IJ 1 TIME ONLY 1 2     Allergies   Allergen Reactions     Sesame Oil Anaphylaxis     Cephalosporins      Recent Labs   Lab Test  02/25/17   1520  02/14/17   1012  11/05/15   0918  08/29/13   0948   05/19/11   0500  11/13/09   0400   LDL   --   127  124  105   < >   --    --    HDL   --   90  95  67   < >   --    --    TRIG   --   62  57  79   < >   --    --    ALT   --   17  20  21   < >   --    --    CR  0.67  0.72  0.73  0.69   < >   --    --    GFRESTIMATED  90  92  91  98   < >   --    --    GFRESTBLACK  >90   GFR Calc     --    --    --    --    --    --    POTASSIUM  3.8  4.0  4.1  4.4   < >   --    --    TSH   --    --    --    --    --   1.06  1.27    < > = values in this interval not displayed.      BP Readings from Last 3 Encounters:   08/16/17 120/74   02/25/17 (!) 143/93   02/14/17 130/84    Wt Readings from Last 3 Encounters:   08/16/17 61.1 kg (134 lb 12.8 oz)   02/25/17 62.1 kg (137 lb)   02/14/17 62.1 kg (137 lb)                          ROS:  Constitutional, HEENT, cardiovascular, pulmonary, gi and gu systems are negative, except as otherwise noted.      OBJECTIVE:   /74 (BP Location: Left arm, Patient Position: Chair, Cuff Size: Adult Regular)  Pulse 72  Temp 98.5  F (36.9  C) (Oral)  Ht 1.632 m (5' 4.25\")  Wt 61.1 kg (134 lb 12.8 oz)  LMP 10/14/2013  SpO2 98%  Breastfeeding? No  BMI 22.96 kg/m2  Body mass index is 22.96 kg/(m^2).   GENERAL: healthy, alert and no distress  EYES: Eyes grossly normal to inspection, " PERRL and conjunctivae and sclerae normal  HENT: ear canals and TM's normal, nose and mouth without ulcers or lesions  NECK: no adenopathy, no asymmetry, masses, or scars and thyroid normal to palpation  RESP: lungs clear to auscultation - no rales, rhonchi or wheezes  CV: regular rate and rhythm, normal S1 S2, no S3 or S4, no murmur, click or rub, no peripheral edema and peripheral pulses strong  ABDOMEN: soft, nontender unless I press very firmly in high RUQ, no hepatosplenomegaly, no masses and bowel sounds normal  MS: no gross musculoskeletal defects noted, no edema  SKIN: no suspicious lesions or rashes  NEURO: Normal strength and tone, mentation intact and speech normal  PSYCH: mentation appears normal, affect normal/bright    Diagnostic Test Results:  Results for orders placed or performed in visit on 08/16/17 (from the past 24 hour(s))   CL AFF HEMOGRAM/PLATE/DIFF ([facility])   Result Value Ref Range    WBC 6.1 4.0 - 11 10*9/L    RBC Count 4.64 3.8 - 5.2 10*12/L    Hemoglobin 14.0 11.7 - 15.7 g/dL    Hematocrit 43.6 35.0 - 47.0 %    MCV 94.0 78 - 100 fL    MCH 30.2 26 - 33 pg    MCHC 32.1 31 - 36 g/dL    Platelet Count 269 150 - 375 10^9/L    % Granulocytes 57.9 %    % Lymphocytes 34.8 %    % Monocytes 7.3 %       ASSESSMENT:       PLAN:   (R10.11) RUQ abdominal pain  (primary encounter diagnosis)  Comment: fairly normal exam today, normal CBC, ddx includes gallbladder, liver, muscular, constipation issues-pt with long hx constipation so I do wonder if this might be part of issue  Plan: CL AFF HEMOGRAM/PLATE/DIFF ([facility]), COMPREHENSIVE        METABOLIC PANEL ([facility]) XCMP, VENOUS COLLECTION        Await CMP, add Miralax to metamucil, if labs normal and symptoms persist despite Miralax will get imaging    patient given instructions to go to emergency department immediately if worsening of symptoms and verbalizes this understanding       BMI:   Estimated body mass index is 22.96 kg/(m^2) as calculated from the  "following:    Height as of this encounter: 1.632 m (5' 4.25\").    Weight as of this encounter: 61.1 kg (134 lb 12.8 oz).               Mike Beltran MD  [facility]  "

## 2017-08-16 NOTE — MR AVS SNAPSHOT
"              After Visit Summary   8/16/2017    Wen Sanchez    MRN: [id number]           Patient Information     Date Of Birth          1960        Visit Information        Provider Department      8/16/2017 6:30 PM Mike Beltran MD [facility]        Today's Diagnoses     RUQ abdominal pain    -  1       Follow-ups after your visit        Who to contact     If you have questions or need follow up information about today's clinic visit or your schedule please contact [facility] directly at [phone].  Normal or non-critical lab and imaging results will be communicated to you by PanelClawhart, letter or phone within 4 business days after the clinic has received the results. If you do not hear from us within 7 days, please contact the clinic through [employer] or phone. If you have a critical or abnormal lab result, we will notify you by phone as soon as possible.  Submit refill requests through [employer] or call your pharmacy and they will forward the refill request to us. Please allow 3 business days for your refill to be completed.          Additional Information About Your Visit        MyChart Information     [employer] gives you secure access to your electronic health record. If you see a primary care provider, you can also send messages to your care team and make appointments. If you have questions, please call your primary care clinic.  If you do not have a primary care provider, please call [phone] and they will assist you.        Care EveryWhere ID     This is your Care EveryWhere ID. This could be used by other organizations to access your [place] medical records  [id number]        Your Vitals Were     Pulse Temperature Height Last Period Pulse Oximetry Breastfeeding?    72 98.5  F (36.9  C) (Oral) 1.632 m (5' 4.25\") 10/14/2013 98% No    BMI (Body Mass Index)                   22.96 kg/m2            Blood Pressure from Last 3 Encounters: "   08/16/17 120/74   02/25/17 (!) 143/93   02/14/17 130/84    Weight from Last 3 Encounters:   08/16/17 61.1 kg (134 lb 12.8 oz)   02/25/17 62.1 kg (137 lb)   02/14/17 62.1 kg (137 lb)              We Performed the Following     CL AFF HEMOGRAM/PLATE/DIFF ([facility])     COMPREHENSIVE METABOLIC PANEL ([facility]) XCMP     VENOUS COLLECTION        Primary Care Provider Office Phone # Fax #    Mike Charles Beltran [phone] [address] [facility] [address]  [facility] [address]        Equal Access to Services     [facility] Center[facility] : Hadii aad ku hadasho Sorejiali, waaxda luqadaha, qaybta kaalmada adesamanthayada, timothy lewis . So [facility] [phone].    ATENCIÓN: Si habla español, tiene a mari disposición servicios gratuitos de asistencia lingüística. Llame al [phone].    We comply with applicable federal civil rights laws and [place] laws. We do not discriminate on the basis of race, color, national origin, age, disability sex, sexual orientation or gender identity.            Thank you!     Thank you for choosing [facility]  for your care. Our goal is always to provide you with excellent care. Hearing back from our patients is one way we can continue to improve our services. Please take a few minutes to complete the written survey that you may receive in the mail after your visit with us. Thank you!             Your Updated Medication List - Protect others around you: Learn how to safely use, store and throw away your medicines at www.disposemymeds.org.          This list is accurate as of: 8/16/17  7:06 PM.  Always use your most recent med list.                   Brand Name Dispense Instructions for use Diagnosis    EPI E-Z PEN QUIRINO 1:1000  IJ     1    1 TIME ONLY        esomeprazole 40 MG CR capsule    nexIUM    90 capsule    TAKE 1 CAPSULE EVERY       MORNING BEFORE BREAKFAST;  TAKE 30-60 MINUTES BEFORE  EATING    Gastroesophageal reflux disease without  esophagitis

## 2017-08-18 LAB
ALBUMIN SERPL-MCNC: 4.4 G/DL (ref 3.6–5.1)
ALBUMIN/GLOB SERPL: 1.8 (CALC) (ref 1–2.5)
ALP SERPL-CCNC: 77 U/L (ref 33–130)
ALT SERPL-CCNC: 18 U/L (ref 6–29)
AST SERPL-CCNC: 24 U/L (ref 10–35)
BILIRUB SERPL-MCNC: 0.5 MG/DL (ref 0.2–1.2)
BUN SERPL-MCNC: 17 MG/DL (ref 7–25)
BUN/CREATININE RATIO: NORMAL (CALC) (ref 6–22)
CALCIUM SERPL-MCNC: 9.5 MG/DL (ref 8.6–10.4)
CHLORIDE SERPLBLD-SCNC: 107 MMOL/L (ref 98–110)
CO2 SERPL-SCNC: 22 MMOL/L (ref 20–31)
CREAT SERPL-MCNC: 0.92 MG/DL (ref 0.5–1.05)
EGFR AFRICAN AMERICAN - QUEST: 80 ML/MIN/1.73M2
GFR SERPL CREATININE-BSD FRML MDRD: 69 ML/MIN/1.73M2
GLOBULIN, CALCULATED - QUEST: 2.5 G/DL (CALC) (ref 1.9–3.7)
GLUCOSE - QUEST: 86 MG/DL (ref 65–99)
POTASSIUM SERPL-SCNC: 3.9 MMOL/L (ref 3.5–5.3)
PROT SERPL-MCNC: 6.9 G/DL (ref 6.1–8.1)
SODIUM SERPL-SCNC: 141 MMOL/L (ref 135–146)

## 2017-08-20 ENCOUNTER — MYC MEDICAL ADVICE (OUTPATIENT)
Dept: FAMILY MEDICINE | Facility: CLINIC | Age: 57
End: 2017-08-20

## 2017-08-20 DIAGNOSIS — R10.11 RUQ ABDOMINAL PAIN: Primary | ICD-10-CM

## 2017-08-21 NOTE — TELEPHONE ENCOUNTER
From: Wen Sanchez  To: Mike Beltran MD  Sent: 8/20/2017 9:55 AM CDT  Subject: A follow-up question for a visit within the past seven days    Looks like my tests came back normal but I still have the pain in my side (gallbladder?). On Saturday it bothered me more than usual after eating spaghetti. Also have been experiencing a little more nausea. Can we scheduled an ultrasound or test to see what my problem is please? If possible make it Tuesday 8/22 before 3pm.

## 2017-09-09 ENCOUNTER — MYC MEDICAL ADVICE (OUTPATIENT)
Dept: FAMILY MEDICINE | Facility: CLINIC | Age: 57
End: 2017-09-09

## 2017-09-09 DIAGNOSIS — R11.0 NAUSEA: Primary | ICD-10-CM

## 2017-09-09 DIAGNOSIS — R19.8 FUNCTIONAL GI SYMPTOMS: ICD-10-CM

## 2017-09-11 NOTE — TELEPHONE ENCOUNTER
From: Wen Sanchez  To: Mike Beltran MD  Sent: 9/9/2017 10:45 AM CDT  Subject: Do I need an appointment?    I'm still having the pain & nausea we discussed at my last visit. Do you think I should have a Hydroscan or what do you suggest?

## 2017-10-04 ENCOUNTER — TRANSFERRED RECORDS (OUTPATIENT)
Dept: FAMILY MEDICINE | Facility: CLINIC | Age: 57
End: 2017-10-04

## 2017-11-01 ENCOUNTER — HOSPITAL ENCOUNTER (OUTPATIENT)
Dept: NUCLEAR MEDICINE | Facility: CLINIC | Age: 57
Setting detail: NUCLEAR MEDICINE
Discharge: HOME | End: 2017-11-01
Attending: INTERNAL MEDICINE | Admitting: INTERNAL MEDICINE
Payer: COMMERCIAL

## 2017-11-01 DIAGNOSIS — R10.11 ABDOMINAL PAIN, RIGHT UPPER QUADRANT: ICD-10-CM

## 2017-11-01 PROCEDURE — 78227 HEPATOBIL SYST IMAGE W/DRUG: CPT

## 2017-11-01 PROCEDURE — 34300033 ZZH RX 343: Performed by: INTERNAL MEDICINE

## 2017-11-01 PROCEDURE — A9537 TC99M MEBROFENIN: HCPCS | Performed by: INTERNAL MEDICINE

## 2017-11-01 RX ORDER — KIT FOR THE PREPARATION OF TECHNETIUM TC 99M MEBROFENIN 45 MG/10ML
6 INJECTION, POWDER, LYOPHILIZED, FOR SOLUTION INTRAVENOUS ONCE
Status: COMPLETED | OUTPATIENT
Start: 2017-11-01 | End: 2017-11-01

## 2017-11-01 RX ADMIN — MEBROFENIN 6 MCI.: 45 INJECTION, POWDER, LYOPHILIZED, FOR SOLUTION INTRAVENOUS at 08:00

## 2017-11-22 ENCOUNTER — TRANSFERRED RECORDS (OUTPATIENT)
Dept: FAMILY MEDICINE | Facility: CLINIC | Age: 57
End: 2017-11-22

## 2018-04-05 ENCOUNTER — MYC REFILL (OUTPATIENT)
Dept: FAMILY MEDICINE | Facility: CLINIC | Age: 58
End: 2018-04-05

## 2018-04-05 DIAGNOSIS — K21.9 GASTROESOPHAGEAL REFLUX DISEASE WITHOUT ESOPHAGITIS: ICD-10-CM

## 2018-04-05 RX ORDER — ESOMEPRAZOLE MAGNESIUM 40 MG/1
40 CAPSULE, DELAYED RELEASE ORAL
Qty: 90 CAPSULE | Refills: 1 | Status: SHIPPED | OUTPATIENT
Start: 2018-04-05 | End: 2018-10-03

## 2018-04-05 NOTE — TELEPHONE ENCOUNTER
Message from CareHubshart:  Niaroseannecarlyle LINOAnthony Arevalokimmed would like a refill of the following medications:  esomeprazole (NEXIUM) 40 MG CR capsule [Mike Beltran MD]    Preferred pharmacy: [facility] - [place], [place] - [address] AT PORTAL TO REGISTERED [facility] SITES    Comment:

## 2018-04-05 NOTE — TELEPHONE ENCOUNTER
Pending Prescriptions:                       Disp   Refills    esomeprazole (NEXIUM) 40 MG CR capsule    90 cap*             Sig: Take 30-60 minutes before eating.    This is Mail order my chart request;    Put in qty,   Fax deny or send to FD  Pt last ov was 8-2017 no notes when to rtc   Or send pt a my chart message  [place]  [phone] (home)

## 2018-05-14 ENCOUNTER — OFFICE VISIT (OUTPATIENT)
Dept: FAMILY MEDICINE | Facility: CLINIC | Age: 58
End: 2018-05-14

## 2018-05-14 VITALS
SYSTOLIC BLOOD PRESSURE: 132 MMHG | BODY MASS INDEX: 23.98 KG/M2 | WEIGHT: 140.8 LBS | TEMPERATURE: 98.3 F | HEART RATE: 67 BPM | DIASTOLIC BLOOD PRESSURE: 82 MMHG | OXYGEN SATURATION: 99 %

## 2018-05-14 DIAGNOSIS — M65.30 TRIGGER FINGER, ACQUIRED: ICD-10-CM

## 2018-05-14 DIAGNOSIS — R21 RASH AND NONSPECIFIC SKIN ERUPTION: Primary | ICD-10-CM

## 2018-05-14 PROCEDURE — 99213 OFFICE O/P EST LOW 20 MIN: CPT | Performed by: FAMILY MEDICINE

## 2018-05-14 NOTE — NURSING NOTE
Elizabet is here for rash on both eyelids for X 1 week, itchy and right hand 3rd digit hurts when pt bends and when pt bends it feels like it pops out of joint    Pre-Visit Screening :  Immunizations : up to date    Colonoscopy : is due and to be scheduled by patient for later completion  Mammogram : is up to date  Asthma Action Test/Plan : na  PHQ9/GAD7 :  Na    Pulse - regular  My Chart - accepts    CLASSIFICATION OF OVERWEIGHT AND OBESITY BY BMI                         Obesity Class           BMI(kg/m2)  Underweight                                    < 18.5  Normal                                         18.5-24.9  Overweight                                     25.0-29.9  OBESITY                     I                  30.0-34.9                              II                 35.0-39.9  EXTREME OBESITY             III                >40                             Patient's  BMI Body mass index is 22.15 kg/(m^2).  http://hin.nhlbi.nih.gov/menuplanner/menu.cgi  Questioned patient about current smoking habits.  Pt. has never smoked.    The patient has verbalized that it is ok to leave a detailed voice message on the patient's cell phone with results/recommendations from this visit.       Verified [phone]  phone number:

## 2018-05-14 NOTE — MR AVS SNAPSHOT
After Visit Summary   5/14/2018    Wen Sanchez    MRN: [id number]           Patient Information     Date Of Birth          1960        Visit Information        Provider Department      5/14/2018 12:30 PM Mike Beltran MD [facility]        Today's Diagnoses     Rash and nonspecific skin eruption    -  1    Trigger finger, acquired           Follow-ups after your visit        Additional Services     ORTHOPEDICS ADULT REFERRAL       Your provider has referred you to: [facility] [phone]   [email or web address]    Please be aware that coverage of these services is subject to the terms and limitations of your health insurance plan.  Call member services at your health plan with any benefit or coverage questions.      Please bring the following to your appointment:    >>   Any x-rays, CTs or MRIs which have been performed.  Contact the facility where they were done to arrange for  prior to your scheduled appointment.    >>   List of current medications   >>   This referral request   >>   Any documents/labs given to you for this referral                  Who to contact     If you have questions or need follow up information about today's clinic visit or your schedule please contact [facility] directly at [phone].  Normal or non-critical lab and imaging results will be communicated to you by MyChart, letter or phone within 4 business days after the clinic has received the results. If you do not hear from us within 7 days, please contact the clinic through MyChart or phone. If you have a critical or abnormal lab result, we will notify you by phone as soon as possible.  Submit refill requests through [employer] or call your pharmacy and they will forward the refill request to us. Please allow 3 business days for your refill to be completed.          Additional Information About  Your Visit        Bar Passhart Information     [employer] gives you secure access to your electronic health record. If you see a primary care provider, you can also send messages to your care team and make appointments. If you have questions, please call your primary care clinic.  If you do not have a primary care provider, please call [phone] and they will assist you.        Care EveryWhere ID     This is your Care EveryWhere ID. This could be used by other organizations to access your [place] medical records  [id number]        Your Vitals Were     Pulse Temperature Last Period Pulse Oximetry Breastfeeding? BMI (Body Mass Index)    67 98.3  F (36.8  C) (Oral) 10/14/2013 99% No 23.98 kg/m2       Blood Pressure from Last 3 Encounters:   05/14/18 132/82   08/16/17 120/74   02/25/17 (!) 143/93    Weight from Last 3 Encounters:   05/14/18 63.9 kg (140 lb 12.8 oz)   08/16/17 61.1 kg (134 lb 12.8 oz)   02/25/17 62.1 kg (137 lb)              We Performed the Following     ORTHOPEDICS ADULT REFERRAL        Primary Care Provider Office Phone # Fax #    Mike Beltran [phone]       [address] [facility] [address]  [place] [id number]        Equal Access to Services     MONTSE MOURA : Hadii aad ku hadasho Soomaali, waaxda luqadaha, qaybta kaalmada adeegyada, waxay idiin hayaan jon khkiana lewis . So [facility] [phone].    ATENCIÓN: Si habla español, tiene a mari disposición servicios gratuitos de asistencia lingüística. Llame al [phone].    We comply with applicable federal civil rights laws and [place] laws. We do not discriminate on the basis of race, color, national origin, age, disability, sex, sexual orientation, or gender identity.            Thank you!     Thank you for choosing [facility]  for your care. Our goal is always to provide you with excellent care. Hearing back from our patients is one way we can continue to improve our services. Please take a few  minutes to complete the written survey that you may receive in the mail after your visit with us. Thank you!             Your Updated Medication List - Protect others around you: Learn how to safely use, store and throw away your medicines at www.disposemymeds.org.          This list is accurate as of 5/14/18 12:47 PM.  Always use your most recent med list.                   Brand Name Dispense Instructions for use Diagnosis    EPI E-Z PEN QUIRINO 1:1000  IJ     1    1 TIME ONLY        esomeprazole 40 MG CR capsule    nexIUM    90 capsule    Take 1 capsule (40 mg) by mouth every morning (before breakfast) Take 30-60 minutes before eating.    Gastroesophageal reflux disease without esophagitis

## 2018-05-14 NOTE — PROGRESS NOTES
SUBJECTIVE:  Wen Sanchez, a 57 year old female scheduled an appointment to discuss the following issues:     Rash and nonspecific skin eruption  Trigger finger, acquired  PT relates 1 week itchy rash on upper eyelids-no new make up, soap, detergent, meds, supplement    Pt also noted left 3rd digit snapping-pain at times.  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    ROS:  CONSTITUTIONAL: NEGATIVE for fever, chills  EYES: NEGATIVE for vision changes   RESP: NEGATIVE for significant cough or SOB  CV: NEGATIVE for chest pain, palpitations   GI: NEGATIVE for nausea, abdominal pain, heartburn, or change in bowel habits  : NEGATIVE for frequency, dysuria, or hematuria  MUSCULOSKELETAL: NEGATIVE for significant arthralgias or myalgia  NEURO: NEGATIVE for weakness, dizziness or paresthesias or headache    OBJECTIVE:  /82 (BP Location: Right arm, Patient Position: Chair, Cuff Size: Adult Regular)  Pulse 67  Temp 98.3  F (36.8  C) (Oral)  Wt 63.9 kg (140 lb 12.8 oz)  LMP 10/14/2013  SpO2 99%  Breastfeeding? No  BMI 23.98 kg/m2  EXAM:  GENERAL APPEARANCE: healthy, alert and no distress  EYES: EOMI,  PERRL  HENT: ear canals and TM's normal and nose and mouth without ulcers or lesions  RESP: lungs clear to auscultation - no rales, rhonchi or wheezes  CV: regular rates and rhythm, normal S1 S2, no S3 or S4 and no murmur, click or rub -  ABDOMEN:  soft, nontender, no HSM or masses and bowel sounds normal  MS: left 3rd digit with notable DJD changes on PIP joint, pt has triggering that is provokeable  SKIN: mild erythematous, flaky rash upper eyelids bilaterally    ASSESSMENT/PLAN:  (R21) Rash and nonspecific skin eruption  (primary encounter diagnosis)  Comment: suspect seborrhea vs mild eczematous flare  Plan: recommend no make up for a week, can try small amount hydrocortisone , if not better try washing with baby shampoo once  daily, call if not better    (M65.30) Trigger finger, acquired  Comment: recommend ortho eval  Plan: ORTHOPEDICS ADULT REFERRAL

## 2018-05-16 ENCOUNTER — MYC MEDICAL ADVICE (OUTPATIENT)
Dept: FAMILY MEDICINE | Facility: CLINIC | Age: 58
End: 2018-05-16

## 2018-05-16 DIAGNOSIS — M65.30 TRIGGER FINGER, ACQUIRED: Primary | ICD-10-CM

## 2018-05-17 NOTE — TELEPHONE ENCOUNTER
From: Wen Sanchez  To: Mike Beltran MD  Sent: 5/16/2018 9:07 PM CDT  Subject: Question about medications    I never received a referral to a hand specialist for my fingers as we discussed. Can you send me one.

## 2018-07-30 ENCOUNTER — HOSPITAL ENCOUNTER (OUTPATIENT)
Dept: MAMMOGRAPHY | Facility: CLINIC | Age: 58
Discharge: HOME | End: 2018-07-30
Attending: FAMILY MEDICINE | Admitting: FAMILY MEDICINE
Payer: COMMERCIAL

## 2018-07-30 DIAGNOSIS — Z12.31 VISIT FOR SCREENING MAMMOGRAM: ICD-10-CM

## 2018-07-30 PROCEDURE — 77067 SCR MAMMO BI INCL CAD: CPT

## 2018-10-03 ENCOUNTER — MYC MEDICAL ADVICE (OUTPATIENT)
Dept: FAMILY MEDICINE | Facility: CLINIC | Age: 58
End: 2018-10-03

## 2018-10-03 DIAGNOSIS — K21.9 GASTROESOPHAGEAL REFLUX DISEASE WITHOUT ESOPHAGITIS: ICD-10-CM

## 2018-10-03 RX ORDER — ESOMEPRAZOLE MAGNESIUM 40 MG/1
40 CAPSULE, DELAYED RELEASE ORAL
Qty: 90 CAPSULE | Refills: 0 | Status: SHIPPED | OUTPATIENT
Start: 2018-10-03 | End: 2019-01-04

## 2018-10-03 RX ORDER — ESOMEPRAZOLE MAGNESIUM 40 MG/1
CAPSULE, DELAYED RELEASE ORAL
Qty: 90 CAPSULE | Refills: 1 | OUTPATIENT
Start: 2018-10-03

## 2018-10-03 NOTE — TELEPHONE ENCOUNTER
From: Wen Sanchez  Sent: 10/3/2018 12:07 PM CDT  To: [facility] [place]  Subject: RE: medication refill    l made my appointment on November 15th can I get a refill on my medication till then . Thank you     ----- Message -----  From: Fartun READ  Sent: 10/3/18, 11:07 AM  To: Elizabet Sanchez  Subject: medication refill    Good morning Elizabet. We received a refill request from you mail order for your Esomeprazole. You are due for a med check. Please call our Appointment Desk to schedule. If you need a short supply of this medication, let us know and we can call in a 30 day to a local pharmacy. Thank you.   [facility] [phone]

## 2018-10-03 NOTE — TELEPHONE ENCOUNTER
Refused Prescriptions:                       Disp   Refills    esomeprazole (NEXIUM) 40 MG CR capsule [Ph*90 cap*1        Sig: TAKE 1 CAPSULE EVERY       MORNING BEFORE BREAKFAST             TAKE 30 TO 60 MINUTES      BEFORE EATING  Refused By: THOMAS STEEN  Reason for Refusal: Patient needs appointment    Denied mail order  Pt is due for a fasting ov med check  Sent pt a my chart  [place]  [phone] (home)

## 2018-10-03 NOTE — TELEPHONE ENCOUNTER
Wen Sanchez is requesting a refill of:    Pending Prescriptions:                       Disp   Refills    esomeprazole (NEXIUM) 40 MG CR capsule    90 cap*0            Sig: Take 1 capsule (40 mg) by mouth every morning           (before breakfast) Take 30-60 minutes before           eating.    Pt has OV on 11/15/18

## 2018-10-21 ENCOUNTER — HOSPITAL ENCOUNTER (EMERGENCY)
Facility: CLINIC | Age: 58
Discharge: HOME | End: 2018-10-21
Attending: EMERGENCY MEDICINE | Admitting: EMERGENCY MEDICINE
Payer: COMMERCIAL

## 2018-10-21 ENCOUNTER — APPOINTMENT (OUTPATIENT)
Dept: ULTRASOUND IMAGING | Facility: CLINIC | Age: 58
End: 2018-10-21
Attending: EMERGENCY MEDICINE
Payer: COMMERCIAL

## 2018-10-21 VITALS
DIASTOLIC BLOOD PRESSURE: 100 MMHG | RESPIRATION RATE: 18 BRPM | HEART RATE: 83 BPM | HEIGHT: 64 IN | WEIGHT: 135 LBS | SYSTOLIC BLOOD PRESSURE: 141 MMHG | TEMPERATURE: 98.2 F | OXYGEN SATURATION: 96 % | BODY MASS INDEX: 23.05 KG/M2

## 2018-10-21 DIAGNOSIS — R10.13 ABDOMINAL PAIN, EPIGASTRIC: ICD-10-CM

## 2018-10-21 LAB
ALBUMIN SERPL-MCNC: 4.1 G/DL (ref 3.4–5)
ALBUMIN UR-MCNC: NEGATIVE MG/DL
ALP SERPL-CCNC: 98 U/L (ref 40–150)
ALT SERPL W P-5'-P-CCNC: 29 U/L (ref 0–50)
ANION GAP SERPL CALCULATED.3IONS-SCNC: 7 MMOL/L (ref 3–14)
APPEARANCE UR: CLEAR
AST SERPL W P-5'-P-CCNC: 19 U/L (ref 0–45)
BASOPHILS # BLD AUTO: 0 10E9/L (ref 0–0.2)
BASOPHILS NFR BLD AUTO: 0 %
BILIRUB SERPL-MCNC: 0.5 MG/DL (ref 0.2–1.3)
BILIRUB UR QL STRIP: NEGATIVE
BUN SERPL-MCNC: 19 MG/DL (ref 7–30)
CALCIUM SERPL-MCNC: 9.2 MG/DL (ref 8.5–10.1)
CHLORIDE SERPL-SCNC: 107 MMOL/L (ref 94–109)
CO2 SERPL-SCNC: 26 MMOL/L (ref 20–32)
COLOR UR AUTO: ABNORMAL
CREAT SERPL-MCNC: 0.74 MG/DL (ref 0.52–1.04)
DIFFERENTIAL METHOD BLD: NORMAL
EOSINOPHIL # BLD AUTO: 0.1 10E9/L (ref 0–0.7)
EOSINOPHIL NFR BLD AUTO: 1.1 %
ERYTHROCYTE [DISTWIDTH] IN BLOOD BY AUTOMATED COUNT: 12.2 % (ref 10–15)
GFR SERPL CREATININE-BSD FRML MDRD: 80 ML/MIN/1.7M2
GLUCOSE SERPL-MCNC: 107 MG/DL (ref 70–99)
GLUCOSE UR STRIP-MCNC: NEGATIVE MG/DL
HCT VFR BLD AUTO: 45.4 % (ref 35–47)
HGB BLD-MCNC: 15.1 G/DL (ref 11.7–15.7)
HGB UR QL STRIP: NEGATIVE
IMM GRANULOCYTES # BLD: 0 10E9/L (ref 0–0.4)
IMM GRANULOCYTES NFR BLD: 0.2 %
INTERPRETATION ECG - MUSE: NORMAL
KETONES UR STRIP-MCNC: NEGATIVE MG/DL
LEUKOCYTE ESTERASE UR QL STRIP: NEGATIVE
LIPASE SERPL-CCNC: 209 U/L (ref 73–393)
LYMPHOCYTES # BLD AUTO: 1.7 10E9/L (ref 0.8–5.3)
LYMPHOCYTES NFR BLD AUTO: 30.7 %
MCH RBC QN AUTO: 29.9 PG (ref 26.5–33)
MCHC RBC AUTO-ENTMCNC: 33.3 G/DL (ref 31.5–36.5)
MCV RBC AUTO: 90 FL (ref 78–100)
MONOCYTES # BLD AUTO: 0.3 10E9/L (ref 0–1.3)
MONOCYTES NFR BLD AUTO: 5.5 %
MUCOUS THREADS #/AREA URNS LPF: PRESENT /LPF
NEUTROPHILS # BLD AUTO: 3.4 10E9/L (ref 1.6–8.3)
NEUTROPHILS NFR BLD AUTO: 62.5 %
NITRATE UR QL: NEGATIVE
NRBC # BLD AUTO: 0 10*3/UL
NRBC BLD AUTO-RTO: 0 /100
PH UR STRIP: 5 PH (ref 5–7)
PLATELET # BLD AUTO: 310 10E9/L (ref 150–450)
POTASSIUM SERPL-SCNC: 3.4 MMOL/L (ref 3.4–5.3)
PROT SERPL-MCNC: 8.1 G/DL (ref 6.8–8.8)
RBC # BLD AUTO: 5.05 10E12/L (ref 3.8–5.2)
RBC #/AREA URNS AUTO: 0 /HPF (ref 0–2)
SODIUM SERPL-SCNC: 140 MMOL/L (ref 133–144)
SOURCE: ABNORMAL
SP GR UR STRIP: 1 (ref 1–1.03)
SQUAMOUS #/AREA URNS AUTO: <1 /HPF (ref 0–1)
UROBILINOGEN UR STRIP-MCNC: 0 MG/DL (ref 0–2)
WBC # BLD AUTO: 5.4 10E9/L (ref 4–11)
WBC #/AREA URNS AUTO: <1 /HPF (ref 0–5)

## 2018-10-21 PROCEDURE — 99285 EMERGENCY DEPT VISIT HI MDM: CPT | Mod: 25

## 2018-10-21 PROCEDURE — 25000132 ZZH RX MED GY IP 250 OP 250 PS 637: Performed by: EMERGENCY MEDICINE

## 2018-10-21 PROCEDURE — 83690 ASSAY OF LIPASE: CPT | Performed by: EMERGENCY MEDICINE

## 2018-10-21 PROCEDURE — 80053 COMPREHEN METABOLIC PANEL: CPT | Performed by: EMERGENCY MEDICINE

## 2018-10-21 PROCEDURE — 85025 COMPLETE CBC W/AUTO DIFF WBC: CPT | Performed by: EMERGENCY MEDICINE

## 2018-10-21 PROCEDURE — 76705 ECHO EXAM OF ABDOMEN: CPT

## 2018-10-21 PROCEDURE — 93005 ELECTROCARDIOGRAM TRACING: CPT

## 2018-10-21 PROCEDURE — 25000125 ZZHC RX 250: Performed by: EMERGENCY MEDICINE

## 2018-10-21 PROCEDURE — 81001 URINALYSIS AUTO W/SCOPE: CPT | Performed by: EMERGENCY MEDICINE

## 2018-10-21 RX ORDER — ONDANSETRON 2 MG/ML
4 INJECTION INTRAMUSCULAR; INTRAVENOUS ONCE
Status: DISCONTINUED | OUTPATIENT
Start: 2018-10-21 | End: 2018-10-21 | Stop reason: HOSPADM

## 2018-10-21 RX ADMIN — LIDOCAINE HYDROCHLORIDE 30 ML: 20 SOLUTION ORAL; TOPICAL at 09:01

## 2018-10-21 ASSESSMENT — ENCOUNTER SYMPTOMS
DYSURIA: 0
DIARRHEA: 0
CONSTIPATION: 0
BLOOD IN STOOL: 0
HEMATURIA: 0
ABDOMINAL PAIN: 1
VOMITING: 0
NAUSEA: 1

## 2018-10-21 NOTE — ED AVS SNAPSHOT
[facility] Emergency Department    [address]    [facility] [address]    Phone:  [phone]    Fax:  [phone]                                       Wen Sanchez   MRN: [id number]    Department:  [facility] Emergency Department   Date of Visit:  10/21/2018           Patient Information     Date Of Birth          1960        Your diagnoses for this visit were:     Abdominal pain, epigastric        You were seen by Lynda Mitchell MD.      Follow-up Information     Follow up with Mike Beltran MD In 2 days.    Specialty:  Family Practice    Contact information:    [address]  [facility] [address]  [phone]          Discharge Instructions       Discharge Instructions  Abdominal Pain    Abdominal pain (belly pain) can be caused by many things. Your evaluation today does not show the exact cause for your pain. Your provider today has decided that it is unlikely your pain is due to a life threatening problem, or a problem requiring surgery or hospital admission. Sometimes those problems cannot be found right away, so it is very important that you follow up as directed.  Sometimes only the changes which occur over time allow the cause of your pain to be found.    Generally, every Emergency Department visit should have a follow-up clinic visit with either a primary or a specialty clinic/provider. Please follow-up as instructed by your emergency provider today. With abdominal pain, we often recommend very close follow-up, such as the following day.    ADULTS:  Return to the Emergency Department right away if:      You get an oral temperature above 102oF or as directed by your provider.    You have blood in your stools. This may be bright red or appear as black, tarry stools.      You keep vomiting (throwing up) or cannot drink liquids.    You see blood when you vomit.     You cannot have a bowel movement or you cannot pass gas.    Your  stomach gets bloated or bigger.    Your skin or the whites of your eyes look yellow.    You faint.    You have bloody, frequent or painful urination (peeing).    You have new symptoms or anything that worries you.    CHILDREN:  Return to the Emergency Department right away if your child has any of the above-listed symptoms or the following:      Pushes your hand away or screams/cries when his/her belly is touched.    You notice your child is very fussy or weak.    Your child is very tired and is too tired to eat or drink.    Your child is dehydrated.  Signs of dehydration can be:  o Significant change in the amount of wet diapers/urine.  o Your infant or child starts to have dry mouth and lips, or no saliva (spit) or tears.    PREGNANT WOMEN:  Return to the Emergency Department right away if you have any of the above-listed symptoms or the following:      You have bleeding, leaking fluid or passing tissue from the vagina.    You have worse pain or cramping, or pain in your shoulder or back.    You have vomiting that will not stop.    You have a temperature of 100oF or more.    Your baby is not moving as much as usual.    You faint.    You get a bad headache with or without eye problems and abdominal pain.    You have a seizure.    You have unusual discharge from your vagina and abdominal pain.    Abdominal pain is pretty common during pregnancy.  Your pain may or may not be related to your pregnancy. You should follow-up closely with your OB provider so they can evaluate you and your baby.  Until you follow-up with your regular provider, do the following:       Avoid sex and do not put anything in your vagina.    Drink clear fluids.    Only take medications approved by your provider.    MORE INFORMATION:    Appendicitis:  A possible cause of abdominal pain in any person who still has their appendix is acute appendicitis. Appendicitis is often hard to diagnose.  Testing does not always rule out early appendicitis or  "other causes of abdominal pain. Close follow-up with your provider and re-evaluations may be needed to figure out the reason for your abdominal pain.    Follow-up:  It is very important that you make an appointment with your clinic and go to the appointment.  If you do not follow-up with your primary provider, it may result in missing an important development which could result in permanent injury or disability and/or lasting pain.  If there is any problem keeping your appointment, call your provider or return to the Emergency Department.    Medications:  Take your medications as directed by your provider today.  Before using over-the-counter medications, ask your provider and make sure to take the medications as directed.  If you have any questions about medications, ask your provider.    Diet:  Resume your normal diet as much as possible, but do not eat fried, fatty or spicy foods while you have pain.  Do not drink alcohol or have caffeine.  Do not smoke tobacco.    Probiotics: If you have been given an antibiotic, you may want to also take a probiotic pill or eat yogurt with live cultures. Probiotics have \"good bacteria\" to help your intestines stay healthy. Studies have shown that probiotics help prevent diarrhea (loose stools) and other intestine problems (including C. diff infection) when you take antibiotics. You can buy these without a prescription in the pharmacy section of the store.     If you were given a prescription for medicine here today, be sure to read all of the information (including the package insert) that comes with your prescription.  This will include important information about the medicine, its side effects, and any warnings that you need to know about.  The pharmacist who fills the prescription can provide more information and answer questions you may have about the medicine.  If you have questions or concerns that the pharmacist cannot address, please call or return to the Emergency " Department.       Remember that you can always come back to the Emergency Department if you are not able to see your regular provider in the amount of time listed above, if you get any new symptoms, or if there is anything that worries you.      Your next 10 appointments already scheduled     Nov 15, 2018  8:00 AM CST   Physical-Complete with Mike Beltran MD   [facility] ([facility])    [address]  [address]  [facility] [address] [phone]           Instruct patient that they should have nothing(except water) after midnight the evening prior to the appointment.              24 Hour Appointment Hotline       To make an appointment at any [facility], call [phone] ([phone]). If you don't have a family doctor or clinic, we will help you find one. [facility] are conveniently located to serve the needs of you and your family.             Review of your medicines      Our records show that you are taking the medicines listed below. If these are incorrect, please call your family doctor or clinic.        Dose / Directions Last dose taken    EPI E-Z PEN QUIRINO 1:1000  IJ   Quantity:  1        1 TIME ONLY   Refills:  2        esomeprazole 40 MG CR capsule   Commonly known as:  nexIUM   Dose:  40 mg   Quantity:  90 capsule        Take 1 capsule (40 mg) by mouth every morning (before breakfast) Take 30-60 minutes before eating.   Refills:  0                Procedures and tests performed during your visit     CBC with platelets differential    Comprehensive metabolic panel    EKG 12 lead    Lipase    UA with Microscopic    US Abdomen Limited      Orders Needing Specimen Collection     None      Pending Results     Date and Time Order Name Status Description    10/21/2018 0844 EKG 12 lead Preliminary             Pending Culture Results     No orders found from 10/19/2018 to 10/22/2018.            Pending Results Instructions     If  you had any lab results that were not finalized at the time of your Discharge, you can call the ED Lab Result RN at [phone]. You will be contacted by this team for any positive Lab results or changes in treatment. The nurses are available 7 days a week from 10A to 6:30P.  You can leave a message 24 hours per day and they will return your call.        Test Results From Your Hospital Stay        10/21/2018  9:01 AM      Component Results     Component Value Ref Range & Units Status    WBC 5.4 4.0 - 11.0 10e9/L Final    RBC Count 5.05 3.8 - 5.2 10e12/L Final    Hemoglobin 15.1 11.7 - 15.7 g/dL Final    Hematocrit 45.4 35.0 - 47.0 % Final    MCV 90 78 - 100 fl Final    MCH 29.9 26.5 - 33.0 pg Final    MCHC 33.3 31.5 - 36.5 g/dL Final    RDW 12.2 10.0 - 15.0 % Final    Platelet Count 310 150 - 450 10e9/L Final    Diff Method Automated Method  Final    % Neutrophils 62.5 % Final    % Lymphocytes 30.7 % Final    % Monocytes 5.5 % Final    % Eosinophils 1.1 % Final    % Basophils 0.0 % Final    % Immature Granulocytes 0.2 % Final    Nucleated RBCs 0 0 /100 Final    Absolute Neutrophil 3.4 1.6 - 8.3 10e9/L Final    Absolute Lymphocytes 1.7 0.8 - 5.3 10e9/L Final    Absolute Monocytes 0.3 0.0 - 1.3 10e9/L Final    Absolute Eosinophils 0.1 0.0 - 0.7 10e9/L Final    Absolute Basophils 0.0 0.0 - 0.2 10e9/L Final    Abs Immature Granulocytes 0.0 0 - 0.4 10e9/L Final    Absolute Nucleated RBC 0.0  Final         10/21/2018  9:17 AM      Component Results     Component Value Ref Range & Units Status    Sodium 140 133 - 144 mmol/L Final    Potassium 3.4 3.4 - 5.3 mmol/L Final    Chloride 107 94 - 109 mmol/L Final    Carbon Dioxide 26 20 - 32 mmol/L Final    Anion Gap 7 3 - 14 mmol/L Final    Glucose 107 (H) 70 - 99 mg/dL Final    Urea Nitrogen 19 7 - 30 mg/dL Final    Creatinine 0.74 0.52 - 1.04 mg/dL Final    GFR Estimate 80 >60 mL/min/1.7m2 Final    Non  GFR Calc    GFR Estimate If Black >90 >60 mL/min/1.7m2  Final     GFR Calc    Calcium 9.2 8.5 - 10.1 mg/dL Final    Bilirubin Total 0.5 0.2 - 1.3 mg/dL Final    Albumin 4.1 3.4 - 5.0 g/dL Final    Protein Total 8.1 6.8 - 8.8 g/dL Final    Alkaline Phosphatase 98 40 - 150 U/L Final    ALT 29 0 - 50 U/L Final    AST 19 0 - 45 U/L Final         10/21/2018  9:17 AM      Component Results     Component Value Ref Range & Units Status    Lipase 209 73 - 393 U/L Final         10/21/2018  9:06 AM      Component Results     Component Value Ref Range & Units Status    Color Urine Straw  Final    Appearance Urine Clear  Final    Glucose Urine Negative NEG^Negative mg/dL Final    Bilirubin Urine Negative NEG^Negative Final    Ketones Urine Negative NEG^Negative mg/dL Final    Specific Gravity Urine 1.003 1.003 - 1.035 Final    Blood Urine Negative NEG^Negative Final    pH Urine 5.0 5.0 - 7.0 pH Final    Protein Albumin Urine Negative NEG^Negative mg/dL Final    Urobilinogen mg/dL 0.0 0.0 - 2.0 mg/dL Final    Nitrite Urine Negative NEG^Negative Final    Leukocyte Esterase Urine Negative NEG^Negative Final    Source Midstream Urine  Final    WBC Urine <1 0 - 5 /HPF Final    RBC Urine 0 0 - 2 /HPF Final    Squamous Epithelial /HPF Urine <1 0 - 1 /HPF Final    Mucous Urine Present (A) NEG^Negative /LPF Final         10/21/2018 10:09 AM      Narrative     ULTRASOUND ABDOMEN LIMITED 10/21/2018 9:31 AM     HISTORY: Right upper quadrant pain. Gallstones.      FINDINGS:  Liver is normal in echogenicity without focal lesions. The  gallbladder is normal without stones or sludge. Common bile duct is  normal in diameter. Pancreas is normal where visualized. Examination  of the right kidney is unremarkable.        Impression     IMPRESSION:  Normal right upper quadrant ultrasound. No gallstones or  bile duct dilatation.    LAURA KAISER MD                Clinical Quality Measure: Blood Pressure Screening     Your blood pressure was checked while you were in the emergency department  today. The last reading we obtained was  BP: (!) 141/100 . Please read the guidelines below about what these numbers mean and what you should do about them.  If your systolic blood pressure (the top number) is less than 120 and your diastolic blood pressure (the bottom number) is less than 80, then your blood pressure is normal. There is nothing more that you need to do about it.  If your systolic blood pressure (the top number) is 120-139 or your diastolic blood pressure (the bottom number) is 80-89, your blood pressure may be higher than it should be. You should have your blood pressure rechecked within a year by a primary care provider.  If your systolic blood pressure (the top number) is 140 or greater or your diastolic blood pressure (the bottom number) is 90 or greater, you may have high blood pressure. High blood pressure is treatable, but if left untreated over time it can put you at risk for heart attack, stroke, or kidney failure. You should have your blood pressure rechecked by a primary care provider within the next 4 weeks.  If your provider in the emergency department today gave you specific instructions to follow-up with your doctor or provider even sooner than that, you should follow that instruction and not wait for up to 4 weeks for your follow-up visit.        Thank you for choosing [facility]       Thank you for choosing [facility] for your care. Our goal is always to provide you with excellent care. Hearing back from our patients is one way we can continue to improve our services. Please take a few minutes to complete the written survey that you may receive in the mail after you visit with us. Thank you!        [employer] Information     [facility] gives you secure access to your electronic health record. If you see a primary care provider, you can also send messages to your care team and make appointments. If you have questions, please call your primary care clinic.  If you do not have a primary care  provider, please call [phone] and they will assist you.        Care EveryWhere ID     This is your Care EveryWhere ID. This could be used by other organizations to access your [facility] medical records  [id number]        Equal Access to Services     MONTSE MOURA : Silvino Wynn, wamaurice hancock, qatray kaalmalarisa nice, timothy busby. So [facility] [phone].    ATENCIÓN: Si habla español, tiene a mari disposición servicios gratuitos de asistencia lingüística. Llame al [phone].    We comply with applicable federal civil rights laws and [place] laws. We do not discriminate on the basis of race, color, national origin, age, disability, sex, sexual orientation, or gender identity.            After Visit Summary       This is your record. Keep this with you and show to your community pharmacist(s) and doctor(s) at your next visit.

## 2018-10-21 NOTE — ED TRIAGE NOTES
Patient presents to ED with c/o right upper quadrant abdominal pain which radiates into her back for several years. Patient also has occasional nausea.

## 2018-10-21 NOTE — ED AVS SNAPSHOT
[facility] Emergency Department    [address]    [facility] [address]    Phone:  [phone]    Fax:  [phone]                                       Wen Sanchez   MRN: [id number]    Department:  [facility] Emergency Department   Date of Visit:  10/21/2018           After Visit Summary Signature Page     I have received my discharge instructions, and my questions have been answered. I have discussed any challenges I see with this plan with the nurse or doctor.    ..........................................................................................................................................  Patient/Patient Representative Signature      ..........................................................................................................................................  Patient Representative Print Name and Relationship to Patient    ..................................................               ................................................  Date                                   Time    ..........................................................................................................................................  Reviewed by Signature/Title    ...................................................              ..............................................  Date                                               Time          22EPIC Rev 08/18

## 2018-10-21 NOTE — ED PROVIDER NOTES
History     Chief Complaint:  Abdominal pain    HPI   Wen Sanchez is a 58 year old female with a history of GERD who presents with her  to the emergency department with concern for abdominal pain. The patient reports that she has had intermittent right upper quadrant abdominal pain which radiates into her back for several years. She states that this pain is somewhat ameliorated with PO intake. She states that she may experience intermittent concurrent nausea, although has never had an episode of emesis with this. She states that the last time she experienced this pain was one year ago for several days, and she saw both her primary care provider and a gastroenterologist. A HIDA scan and an endoscopy were performed which both resulted unremarkable. She states that two days ago she noticed a return of this pain, which has gradually worsened in severity, and it's persistence into this morning prompted her evaluation today. She states she has taken 40 mg of Nexium daily for her GERD for many years. She states that she also takes metamucil daily. She notes that both her mother and son have had cholecystectomies. She denies bowel movement abnormalities, blood in stool, heavy drinking, urinary symptoms, history of surgical abdomen, other medical problems, taking other medications, As well as all other concerns here in the ER today.       Allergies:  Cephalosporins  Sesame oil    Medications:    Nexium     Past Medical History:    GERD  Panic disorder    Past Surgical History:    Breast augmentation    Family History:    DM  Cancer    Social History:  Marital Status:   [2]  Negative for tobacco use.  A drinks per week    Review of Systems   Cardiovascular: Negative for chest pain.   Gastrointestinal: Positive for abdominal pain and nausea. Negative for blood in stool, constipation, diarrhea and vomiting.   Genitourinary: Negative for dysuria and hematuria.   All other systems reviewed and are  "negative.      Physical Exam     Patient Vitals for the past 24 hrs:   BP Temp Temp src Pulse Resp SpO2 Height Weight   10/21/18 1000 (!) 141/100 - - - - 96 % - -   10/21/18 0930 (!) 138/91 - - - - 97 % - -   10/21/18 0900 (!) 146/93 - - - - 97 % - -   10/21/18 0848 (!) 180/111 98.2  F (36.8  C) Oral 83 18 98 % 1.626 m (5' 4\") 61.2 kg (135 lb)         Physical Exam  General: Patient is alert and interactive when I enter the room, in no acute distress  Head:  The scalp, face, and head appear normal  Eyes:  Conjunctivae are normal  ENT:    The nose is normal    Pinnae are normal    External acoustic canals are normal  Neck:  Trachea midline  CV:  Pulses are normal  Resp:  No respiratory distress   Abdomen:      Soft, focal epigastric tenderness, negative Mcknight's sign, no guarding, non-distended  Musc:  Normal muscular tone    No major joint effusions    No asymmetric leg swelling  Skin:  No rash or lesions noted  Neuro:  Speech is normal and fluent. Face is symmetric.     Moving all extremities well.   Psych: Awake. Alert.  Normal affect.  Appropriate interactions.  Emergency Department Course   ECG:    Indication: abdominal pain  Time: 0854  Vent. Rate 73 bpm. CA interval 156. QRS duration 94. QT/QTc 382/420. P-R-T axis 63 19 43.  Normal sinus rhythm. Incomplete right bundle branch block. Septal infarct, age undetermined. Abnormal ECG. Read time: 0856      Imaging:  Radiographic findings were communicated with the patient who voiced understanding of the findings.    US Abdomen, Ltd:   Normal right upper quadrant ultrasound. No gallstones or  bile duct dilatation. as per radiology.       Laboratory:    CBC: WBC: 5.4, HGB: 15.1, PLT: 310  CMP: Glucose 107 (H), o/w WNL (Creatinine: 0.74)  0850 Lipase: 209    UA with Microscopic: mucous: present (A), o/w WNL      Interventions:    0901 Xylocaine 30 mL Oral  1008 Zofran 4 mg IV    Emergency Department Course:    Nursing notes and vitals reviewed. (4818) I performed an " exam of the patient as documented above.     IV inserted. Medicine administered as documented above. Blood drawn. This was sent to the lab for further testing, results above.    The patient provided a urine sample here in the emergency department. This was sent for laboratory testing, findings above.     The patient was sent for a abdomen US while in the emergency department, findings above.     (1001) I rechecked the patient and discussed the results of her workup thus far.     Findings and plan explained to the Patient. Patient discharged home with instructions regarding supportive care, medications, and reasons to return. The importance of close follow-up was reviewed.     I personally reviewed the laboratory results with the Patient and answered all related questions prior to discharge.     Impression & Plan      Medical Decision Making:  This patient is a 58 year old female presenting with epigastric pain. At this point, the exact etiology is unknown, however I feel we have adequately assessed for acute and dangerous pathology. We did check LFT's and lipase, all of which were negative. Patient notes RUQ pain raising my concern for cholelithiasis or gallbladder infection.  RUQ US resulted unremarkable.  She does not have a history of any intraabdominal surgeries and has normal bowel sounds, no rebound/guarding/peritoneal signs, and nl bs to increase my concern for a bowel obstruction. She is well appearing. I suspect this pain is ultimately secondary to gastritis versus peptic ulcer disease.Here she did have temporary relief with the GI cocktail.. We discussed options for treating this and ultimately have decided to discharge the patient. I explained that if her symptoms did not remarkable improve, she does need to follow up with her PCP as they may do an outpt H.pylori test and/or refer to GI. We discussed signs and symptom that should prompt her return to the emergency department.      Diagnosis:    ICD-10-CM     1. Abdominal pain, epigastric R10.13        Disposition:  discharged to home     Scribe Disclosure:  I, Marcelo Wells, am serving as a scribe on 10/21/2018 at 8:33 AM to personally document services performed by Lynda Mitchell MD based on my observations and the provider's statements to me.       Marcelo Wells  10/21/2018   [facility] EMERGENCY DEPARTMENT       Lynda Mitchell MD  10/23/18 1917

## 2018-11-05 ENCOUNTER — OFFICE VISIT (OUTPATIENT)
Dept: FAMILY MEDICINE | Facility: CLINIC | Age: 58
End: 2018-11-05

## 2018-11-05 VITALS
TEMPERATURE: 98.5 F | HEART RATE: 71 BPM | SYSTOLIC BLOOD PRESSURE: 146 MMHG | WEIGHT: 135 LBS | DIASTOLIC BLOOD PRESSURE: 88 MMHG | BODY MASS INDEX: 23.17 KG/M2 | OXYGEN SATURATION: 98 %

## 2018-11-05 DIAGNOSIS — R30.0 DYSURIA: Primary | ICD-10-CM

## 2018-11-05 LAB
ALBUMIN (URINE): ABNORMAL MG/DL
APPEARANCE UR: CLEAR
BACTERIA, UR: ABNORMAL
BILIRUB UR QL: ABNORMAL
CASTS/LPF: ABNORMAL
COLOR UR: YELLOW
EP/HPF: ABNORMAL
GLUCOSE URINE: ABNORMAL MG/DL
HGB UR QL: ABNORMAL
KETONES UR QL: ABNORMAL MG/DL
LEUKOCYTE ESTERASE - QUEST: ABNORMAL
MISC.: ABNORMAL
NITRITE UR QL STRIP: ABNORMAL
PH UR STRIP: 5.5 PH (ref 5–7)
RBC, UR MICRO: ABNORMAL (ref ?–2)
SP. GRAVITY: <=1.005
UROBILINOGEN UR QL STRIP: 0.2 EU/DL (ref 0.2–1)
WBC, UR MICRO: ABNORMAL (ref ?–2)

## 2018-11-05 PROCEDURE — 81001 URINALYSIS AUTO W/SCOPE: CPT | Performed by: PHYSICIAN ASSISTANT

## 2018-11-05 PROCEDURE — 87086 URINE CULTURE/COLONY COUNT: CPT | Mod: 90 | Performed by: PHYSICIAN ASSISTANT

## 2018-11-05 PROCEDURE — 99213 OFFICE O/P EST LOW 20 MIN: CPT | Performed by: PHYSICIAN ASSISTANT

## 2018-11-05 NOTE — NURSING NOTE
Cinde is here for a possible UTI          Pre-visit Screening:  Immunizations:  up to date  Colonoscopy:  is up to date  Mammogram: is up to date  Asthma Action Test/Plan:  NA  PHQ9:  none  GAD7:  none  Questioned patient about current smoking habits Pt. has never smoked.  Ok to leave detailed message on voice mail for today's visit only Yes, phone # [phone]

## 2018-11-05 NOTE — PROGRESS NOTES
CC: LLQ pain    History:  Elizabet woke up at 2 AM today with LLQ pain. She also felt somewhat nauseous. This concerned her that she may have a kidney stone. Had a BM and only felt some relief. No blood in stool. Has also had constant urge to urinate starting at the same time. No vaginal symptoms. No fever, sweats, chills. No loss of bowel, bladder control. Was in ER for RUQ pain episode 2 weeks ago, that was thought to be gastritis.    PMH, MEDICATIONS, ALLERGIES, SOCIAL AND FAMILY HISTORY in [facility] and reviewed by me personally.      ROS negative other than the symptoms noted above in the HPI.        Examination   /88 (BP Location: Left arm, Patient Position: Sitting)  Pulse 71  Temp 98.5  F (36.9  C) (Oral)  Wt 61.2 kg (135 lb)  LMP 10/14/2013  SpO2 98%  BMI 23.17 kg/m2       Constitutional: Sitting comfortably, in no acute distress. Vital signs noted  Mouth and throat: without erythema or lesions of the mucosa  Neck:  no adenopathy, trachea midline and normal to palpation  Cardiovascular:  regular rate and rhythm, no murmurs, clicks, or gallops  Respiratory:  normal respiratory rate and rhythm, lungs clear to auscultation  Abdomen: Abdomen soft, non-tender. BS normal. No masses, organomegaly  : External genitalia with atrophic changes. Vaginal canal with small amount of clear discharge. Cervix intact without abnormality.  SKIN: No jaundice/pallor/rash.   Psychiatric: mentation appears normal and affect normal/bright        A/P    ICD-10-CM    1. Dysuria R30.0 HCL  Urinalysis, Routine (BFP)     Urine Culture Aerobic Bacterial     CANCELED: A WET PREP (BFP)       DISCUSSION:  UA today appears clear, but will culture to confirm no infection. Suspect atrophic vaginitis, vs dilute sample that will need culture to confirm. Recommended Azo as needed for discomfort/urge. Also recommended Replens to help with possible atrophic vaginitis. Will contact her via RECUPYLt when culture results return, with any further  recommendations.       follow up visit: As needed    [facility]

## 2018-11-05 NOTE — MR AVS SNAPSHOT
After Visit Summary   11/5/2018    Wen Sanchez    MRN: [id number]           Patient Information     Date Of Birth          1960        Visit Information        Provider Department      11/5/2018 9:15 AM Martina Vaughn PA-C [facility]        Today's Diagnoses     Dysuria    -  1       Follow-ups after your visit        Follow-up notes from your care team     Return if symptoms worsen or fail to improve.      Your next 10 appointments already scheduled     Nov 15, 2018  8:00 AM CST   Physical-Complete with Mike Beltran MD   [facility] ([facility])    [address]  [address]  [facility] [address] [phone]           Instruct patient that they should have nothing(except water) after midnight the evening prior to the appointment.              Who to contact     If you have questions or need follow up information about today's clinic visit or your schedule please contact [facility] directly at [phone].  Normal or non-critical lab and imaging results will be communicated to you by OrderAheadhart, letter or phone within 4 business days after the clinic has received the results. If you do not hear from us within 7 days, please contact the clinic through Jut Inct or phone. If you have a critical or abnormal lab result, we will notify you by phone as soon as possible.  Submit refill requests through 1000memories or call your pharmacy and they will forward the refill request to us. Please allow 3 business days for your refill to be completed.          Additional Information About Your Visit        OrderAheadhart Information     1000memories gives you secure access to your electronic health record. If you see a primary care provider, you can also send messages to your care team and make appointments. If you have questions, please call your primary care clinic.  If you do not have a primary care  provider, please call [phone] and they will assist you.        Care EveryWhere ID     This is your Care EveryWhere ID. This could be used by other organizations to access your [place] medical records  [id number]        Your Vitals Were     Pulse Temperature Last Period Pulse Oximetry BMI (Body Mass Index)       71 98.5  F (36.9  C) (Oral) 10/14/2013 98% 23.17 kg/m2        Blood Pressure from Last 3 Encounters:   11/05/18 146/88   10/21/18 (!) 141/100   05/14/18 132/82    Weight from Last 3 Encounters:   11/05/18 61.2 kg (135 lb)   10/21/18 61.2 kg (135 lb)   05/14/18 63.9 kg (140 lb 12.8 oz)              We Performed the Following     HCL  Urinalysis, Routine (BFP)     Urine Culture Aerobic Bacterial        Primary Care Provider Office Phone # Fax #    Mike Beltran [phone] [address] [address]        Equal Access to Services     [facility]: Hadii aad ku hadasho Soomaali, waaxda luqadaha, qaybta kaalmada adeegyada, waxay eric hayvaibhav lewis . So [facility] [phone].    ATENCIÓN: Si habla español, tiene a mari disposición servicios gratuitos de asistencia lingüística. Llame al [phone].    We comply with applicable federal civil rights laws and [place] laws. We do not discriminate on the basis of race, color, national origin, age, disability, sex, sexual orientation, or gender identity.            Thank you!     Thank you for choosing [facility]  for your care. Our goal is always to provide you with excellent care. Hearing back from our patients is one way we can continue to improve our services. Please take a few minutes to complete the written survey that you may receive in the mail after your visit with us. Thank you!             Your Updated Medication List - Protect others around you: Learn how to safely use, store and throw away your medicines at www.disposemymeds.org.          This list is  accurate as of 11/5/18 11:38 PM.  Always use your most recent med list.                   Brand Name Dispense Instructions for use Diagnosis    EPI E-Z PEN QUIRINO 1:1000  IJ     1    1 TIME ONLY        esomeprazole 40 MG CR capsule    nexIUM    90 capsule    Take 1 capsule (40 mg) by mouth every morning (before breakfast) Take 30-60 minutes before eating.    Gastroesophageal reflux disease without esophagitis

## 2018-11-07 LAB — URINE VOIDED CULTURE: NORMAL

## 2018-11-15 ENCOUNTER — OFFICE VISIT (OUTPATIENT)
Dept: FAMILY MEDICINE | Facility: CLINIC | Age: 58
End: 2018-11-15

## 2018-11-15 ENCOUNTER — TRANSFERRED RECORDS (OUTPATIENT)
Dept: FAMILY MEDICINE | Facility: CLINIC | Age: 58
End: 2018-11-15

## 2018-11-15 VITALS
DIASTOLIC BLOOD PRESSURE: 82 MMHG | BODY MASS INDEX: 22.56 KG/M2 | SYSTOLIC BLOOD PRESSURE: 130 MMHG | HEART RATE: 68 BPM | OXYGEN SATURATION: 98 % | WEIGHT: 135.4 LBS | TEMPERATURE: 98.3 F | HEIGHT: 65 IN

## 2018-11-15 DIAGNOSIS — Z12.4 ENCOUNTER FOR SCREENING FOR CERVICAL CANCER: ICD-10-CM

## 2018-11-15 DIAGNOSIS — K21.9 GASTROESOPHAGEAL REFLUX DISEASE WITHOUT ESOPHAGITIS: ICD-10-CM

## 2018-11-15 DIAGNOSIS — Z00.00 ROUTINE GENERAL MEDICAL EXAMINATION AT A HEALTH CARE FACILITY: Primary | ICD-10-CM

## 2018-11-15 PROCEDURE — 99396 PREV VISIT EST AGE 40-64: CPT | Performed by: FAMILY MEDICINE

## 2018-11-15 PROCEDURE — 36415 COLL VENOUS BLD VENIPUNCTURE: CPT | Performed by: FAMILY MEDICINE

## 2018-11-15 PROCEDURE — 80061 LIPID PANEL: CPT | Mod: 90 | Performed by: FAMILY MEDICINE

## 2018-11-15 PROCEDURE — 88142 CYTOPATH C/V THIN LAYER: CPT | Mod: 90 | Performed by: FAMILY MEDICINE

## 2018-11-15 NOTE — NURSING NOTE
Patient is here for a full physical exam.    Pre-Visit Screening :  Immunizations : up to date  Colon Screening : is up to date  Mammogram: up to date  Asthma Action Test/Plan : NA  PHQ9 :  NA  GAD7 :  NA  Patient's  BMI Body mass index is 22.88 kg/(m^2).  Questioned patient about current smoking habits.  Pt. has never smoked.  OK to leave a detailed voice message regarding today's visit Yes, phone # [phone]      ETOH screening:  Questions:  1-How often do you have a drink containing alcohol?                             1 times per week  2-How many drinks containing alcohol do you have on a typical day when you are         Drinking?                              2   3- How often do you have 5 or more drinks on one occasion?                              Never

## 2018-11-15 NOTE — MR AVS SNAPSHOT
After Visit Summary   11/15/2018    Wen Sanchez    MRN: [id number]           Patient Information     Date Of Birth          1960        Visit Information        Provider Department      11/15/2018 8:00 AM Mike Beltran MD [facility]        Today's Diagnoses     Routine general medical examination at a health care facility    -  1    Encounter for screening for cervical cancer         Gastroesophageal reflux disease without esophagitis           Follow-ups after your visit        Follow-up notes from your care team     Return in about 1 year (around 11/15/2019).      Who to contact     If you have questions or need follow up information about today's clinic visit or your schedule please contact [facility] directly at [phone].  Normal or non-critical lab and imaging results will be communicated to you by MyChart, letter or phone within 4 business days after the clinic has received the results. If you do not hear from us within 7 days, please contact the clinic through Schoolwireshart or phone. If you have a critical or abnormal lab result, we will notify you by phone as soon as possible.  Submit refill requests through Extended Care Information Network or call your pharmacy and they will forward the refill request to us. Please allow 3 business days for your refill to be completed.          Additional Information About Your Visit        MyChart Information     Extended Care Information Network gives you secure access to your electronic health record. If you see a primary care provider, you can also send messages to your care team and make appointments. If you have questions, please call your primary care clinic.  If you do not have a primary care provider, please call [phone] and they will assist you.        Care EveryWhere ID     This is your Care EveryWhere ID. This could be used by other organizations to access your [facility] medical records  [id number]        Your Vitals Were   "   Pulse Temperature Height Last Period Pulse Oximetry BMI (Body Mass Index)    68 98.3  F (36.8  C) (Oral) 1.638 m (5' 4.5\") 10/14/2013 98% 22.88 kg/m2       Blood Pressure from Last 3 Encounters:   11/15/18 130/82   11/05/18 146/88   10/21/18 (!) 141/100    Weight from Last 3 Encounters:   11/15/18 61.4 kg (135 lb 6.4 oz)   11/05/18 61.2 kg (135 lb)   10/21/18 61.2 kg (135 lb)              We Performed the Following     Lipid Profile ([facility])     ThinPrep Pap and HPV (mRNA E6/E7){HPV-REFLEX} ([facility])     VENOUS COLLECTION        Primary Care Provider Office Phone # Fax #    Mike Beltran [phone] [address] [address]        Equal Access to Services     [facility]IL[facility] : Hadii yon ku hadasho Soomaali, waaxda luqadaha, qaybta kaalmada adeegyada, waxay eric lewis . So [facility] [phone].    ATENCIÓN: Si lisala rambo, tiene a mari disposición servicios gratuitos de asistencia lingüística. Llame al [phone].    We comply with applicable federal civil rights laws and [place] laws. We do not discriminate on the basis of race, color, national origin, age, disability, sex, sexual orientation, or gender identity.            Thank you!     Thank you for choosing [facility]  for your care. Our goal is always to provide you with excellent care. Hearing back from our patients is one way we can continue to improve our services. Please take a few minutes to complete the written survey that you may receive in the mail after your visit with us. Thank you!             Your Updated Medication List - Protect others around you: Learn how to safely use, store and throw away your medicines at www.disposemymeds.org.          This list is accurate as of 11/15/18  8:28 AM.  Always use your most recent med list.                   Brand Name Dispense Instructions for use Diagnosis    EPI E-Z PEN QUIRINO 1:1000  IJ     1    1 TIME ONLY "        esomeprazole 40 MG CR capsule    nexIUM    90 capsule    Take 1 capsule (40 mg) by mouth every morning (before breakfast) Take 30-60 minutes before eating.    Gastroesophageal reflux disease without esophagitis       METAMUCIL FIBER PO

## 2018-11-15 NOTE — PROGRESS NOTES
SUBJECTIVE:   CC: Wen Sanchez is an 58 year old woman who presents for preventive health visit.     Healthy Habits:    Do you get at least three servings of calcium containing foods daily (dairy, green leafy vegetables, etc.)? yes    Amount of exercise or daily activities, outside of work: 2 day(s) per week    Problems taking medications regularly No    Medication side effects: No    Have you had an eye exam in the past two years? yes    Do you see a dentist twice per year? yes    Do you have sleep apnea, excessive snoring or daytime drowsiness?no          Today's PHQ-2 Score:   PHQ-2 ( 1999 Pfizer) 11/5/2018 2/14/2017   Q1: Little interest or pleasure in doing things 0 0   Q2: Feeling down, depressed or hopeless 0 0   PHQ-2 Score 0 0       Abuse: Current or Past(Physical, Sexual or Emotional)- No  Do you feel safe in your environment - Yes    Social History   Substance Use Topics     Smoking status: Never Smoker     Smokeless tobacco: Never Used     Alcohol use 2.0 oz/week     4 Standard drinks or equivalent per week      Comment: weekends      If you drink alcohol do you typically have >3 drinks per day or >7 drinks per week? No                     Reviewed orders with patient.  Reviewed health maintenance and updated orders accordingly - Yes  BP Readings from Last 3 Encounters:   11/15/18 130/82   11/05/18 146/88   10/21/18 (!) 141/100    Wt Readings from Last 3 Encounters:   11/15/18 61.4 kg (135 lb 6.4 oz)   11/05/18 61.2 kg (135 lb)   10/21/18 61.2 kg (135 lb)                  Patient Active Problem List   Diagnosis     Panic disorder without agoraphobia     Esophageal reflux     Hypoglycemia     Undiagnosed cardiac murmurs     Calculus of kidney     Counseling for living will     Health Care Home     Encounter for counseling     ACP (advance care planning)     Symptomatic menopausal or female climacteric states     Past Surgical History:   Procedure Laterality Date     C ENLARGE BREAST  9/01     C  MAMMOGRAM, SCREENING  2012     COLONOSCOPY  2010     EYE EXAM ESTABLISHED PT  2013     HC UGI ENDOSCOPY DIAG W OR W/O BRUSH/WASH  2001    Dr. Machuca     TEST NOT FOUND  10/2001    Normal GBUS except for liver hemangioma       Social History   Substance Use Topics     Smoking status: Never Smoker     Smokeless tobacco: Never Used     Alcohol use 2.0 oz/week     4 Standard drinks or equivalent per week      Comment: weekends      Family History   Problem Relation Age of Onset     Diabetes Mother      Cancer Sister      cervical     HEART DISEASE No family hx of      Lipids No family hx of      Diabetes Father      Breast Cancer No family hx of      Cancer - colorectal No family hx of          Current Outpatient Prescriptions   Medication Sig Dispense Refill     EPI E-Z PEN QUIRINO 1:1000  IJ 1 TIME ONLY 1 2     esomeprazole (NEXIUM) 40 MG CR capsule Take 1 capsule (40 mg) by mouth every morning (before breakfast) Take 30-60 minutes before eating. 90 capsule 0     Psyllium (METAMUCIL FIBER PO)        Allergies   Allergen Reactions     Sesame Oil Anaphylaxis     Cephalosporins      Recent Labs   Lab Test  10/21/18   0850  08/16/17   1902  02/25/17   1520  02/14/17   1012  11/05/15   0918  08/29/13   0948   05/19/11   0500   LDL   --    --    --   127  124  105   < >   --    HDL   --    --    --   90  95  67   < >   --    TRIG   --    --    --   62  57  79   < >   --    ALT  29  18   --   17  20  21   < >   --    CR  0.74  0.92  0.67  0.72  0.73  0.69   < >   --    GFRESTIMATED  80  69  90  92  91  98   < >   --    GFRESTBLACK  >90   --   >90   GFR Calc     --    --    --    --    --    POTASSIUM  3.4  3.9  3.8  4.0  4.1  4.4   < >   --    TSH   --    --    --    --    --    --    --   1.06    < > = values in this interval not displayed.        Patient over age 50, mutual decision to screen reflected in health maintenance.    Pertinent mammograms are reviewed under the imaging tab.  History of abnormal Pap  "smear: NO - age 30- 65 PAP every 3 years recommended  PAP / HPV 10/29/2007 2/24/2005 10/16/2003   PAP DATE - [facility] - 20031023 [id number]   HPV DNA SEE NOTE - -     Reviewed and updated as needed this visit by clinical staff  Tobacco  Allergies  Problems         Reviewed and updated as needed this visit by Provider        Past Medical History:   Diagnosis Date     Family history of diabetes mellitus     Mother and Father     Gastroesophageal reflux disease      Kidney stones       Past Surgical History:   Procedure Laterality Date     C ENLARGE BREAST  9/01     C MAMMOGRAM, SCREENING  2012     COLONOSCOPY  2010     EYE EXAM ESTABLISHED PT  2013      UGI ENDOSCOPY DIAG W OR W/O BRUSH/WASH  2001    Dr. Machuca     TEST NOT FOUND  10/2001    Normal GBUS except for liver hemangioma       ROS:  CONSTITUTIONAL: NEGATIVE for fever, chills, change in weight  INTEGUMENTARY/SKIN: NEGATIVE for worrisome rashes, moles or lesions  EYES: NEGATIVE for vision changes or irritation  ENT: NEGATIVE for ear, mouth and throat problems  RESP: NEGATIVE for significant cough or SOB  BREAST: NEGATIVE for masses, tenderness or discharge  CV: NEGATIVE for chest pain, palpitations or peripheral edema  GI: NEGATIVE for nausea, abdominal pain, heartburn, or change in bowel habits  : NEGATIVE for unusual urinary or vaginal symptoms. No vaginal bleeding.  MUSCULOSKELETAL: NEGATIVE for significant arthralgias or myalgia  NEURO: NEGATIVE for weakness, dizziness or paresthesias  ENDOCRINE: NEGATIVE for temperature intolerance, skin/hair changes  HEME/ALLERGY/IMMUNE: NEGATIVE for bleeding problems  PSYCHIATRIC: NEGATIVE for changes in mood or affect     OBJECTIVE:   /82 (BP Location: Left arm, Patient Position: Sitting, Cuff Size: Adult Large)  Pulse 68  Temp 98.3  F (36.8  C) (Oral)  Ht 1.638 m (5' 4.5\")  Wt 61.4 kg (135 lb 6.4 oz)  LMP 10/14/2013  SpO2 98%  BMI 22.88 kg/m2  EXAM:  GENERAL: healthy, alert and no distress  EYES: Eyes " grossly normal to inspection, PERRL and conjunctivae and sclerae normal  HENT: ear canals and TM's normal, nose and mouth without ulcers or lesions  NECK: no adenopathy, no asymmetry, masses, or scars and thyroid normal to palpation  RESP: lungs clear to auscultation - no rales, rhonchi or wheezes  CV: regular rate and rhythm, normal S1 S2, no S3 or S4, no murmur, click or rub, no peripheral edema and peripheral pulses strong  ABDOMEN: soft, nontender, no hepatosplenomegaly, no masses and bowel sounds normal   (female): normal female external genitalia, normal urethral meatus, vaginal mucosa, normal cervix/adnexa/uterus without masses or discharge  MS: no gross musculoskeletal defects noted, no edema  SKIN: no suspicious lesions or rashes  NEURO: Normal strength and tone, mentation intact and speech normal  PSYCH: mentation appears normal, affect normal/bright  LYMPH: no cervical, supraclavicular, axillary, or inguinal adenopathy        ASSESSMENT/PLAN:   (Z00.00) Routine general medical examination at a health care facility  (primary encounter diagnosis)  Comment: discussed preventitive healthcare   Plan: Lipid Profile ([employer]), VENOUS COLLECTION        Continue to work on healthy diet and exercise, discussed healthy habits     (Z12.4) Encounter for screening for cervical cancer   Comment:   Plan: ThinPrep Pap and HPV (mRNA E6/E7)         ([facility])            (K21.9) Gastroesophageal reflux disease without esophagitis  Comment: stable  Plan: continue current medications at current doses     COUNSELING:   Reviewed preventive health counseling, as reflected in patient instructions       Regular exercise       Healthy diet/nutrition       Vision screening       Immunizations    Recommend flu and Shingrix-she will get with              Colon cancer screening    BP Readings from Last 1 Encounters:   11/15/18 130/82     Estimated body mass index is 22.88 kg/(m^2) as calculated from the following:    Height as of  "this encounter: 1.638 m (5' 4.5\").    Weight as of this encounter: 61.4 kg (135 lb 6.4 oz).    BP Screening:   Last 3 BP Readings:    BP Readings from Last 3 Encounters:   11/15/18 130/82   11/05/18 146/88   10/21/18 (!) 141/100       The following was recommended to the patient:  Re-screen BP within a year and recommended lifestyle modifications       reports that she has never smoked. She has never used smokeless tobacco.      Counseling Resources:  ATP IV Guidelines  Pooled Cohorts Equation Calculator  Breast Cancer Risk Calculator  FRAX Risk Assessment  ICSI Preventive Guidelines  Dietary Guidelines for Americans, 2010  USDA's MyPlate  ASA Prophylaxis  Lung CA Screening    Mike Beltran MD  [facility]  "

## 2018-11-16 LAB
CHOLEST SERPL-MCNC: 233 MG/DL
CHOLEST/HDLC SERPL: 2.4 (CALC)
HDLC SERPL-MCNC: 98 MG/DL
LDLC SERPL CALC-MCNC: 120 MG/DL (CALC)
NONHDLC SERPL-MCNC: 135 MG/DL (CALC)
TRIGL SERPL-MCNC: 56 MG/DL

## 2018-11-21 LAB
CLINICAL HISTORY - QUEST: NORMAL
COMMENT - QUEST: NORMAL
CYTOTECHNOLOGIST - QUEST: NORMAL
DESCRIPTIVE DIAGNOSIS - QUEST: NORMAL
LAST PAP DX - QUEST: NORMAL
LMP - QUEST: NORMAL
PREV BX DX - QUEST: NORMAL
SOURCE: NORMAL
STATEMENT OF ADEQUACY - QUEST: NORMAL

## 2019-01-04 DIAGNOSIS — K21.9 GASTROESOPHAGEAL REFLUX DISEASE WITHOUT ESOPHAGITIS: ICD-10-CM

## 2019-01-04 RX ORDER — ESOMEPRAZOLE MAGNESIUM 40 MG/1
CAPSULE, DELAYED RELEASE ORAL
Qty: 90 CAPSULE | Refills: 3 | Status: SHIPPED | OUTPATIENT
Start: 2019-01-04 | End: 2020-06-03

## 2019-01-04 NOTE — TELEPHONE ENCOUNTER
Pending Prescriptions:                       Disp   Refills    esomeprazole (NEXIUM) 40 MG DR capsule [P*90 cap*             Sig: TAKE 1 CAPSULE EVERY       MORNING BEFORE           BREAKFAST   30-60 MINUTES BEFORE EATING    THIS IS MAIL ORDER:    Last refill was 10-3-2018  Last px was 11-  Change qty, fax and close encounter  [place]  [phone] (home)

## 2019-05-09 ENCOUNTER — OFFICE VISIT (OUTPATIENT)
Dept: FAMILY MEDICINE | Facility: CLINIC | Age: 59
End: 2019-05-09

## 2019-05-09 VITALS
OXYGEN SATURATION: 96 % | HEIGHT: 65 IN | BODY MASS INDEX: 22.76 KG/M2 | SYSTOLIC BLOOD PRESSURE: 130 MMHG | DIASTOLIC BLOOD PRESSURE: 88 MMHG | WEIGHT: 136.6 LBS | HEART RATE: 63 BPM | TEMPERATURE: 98.3 F

## 2019-05-09 DIAGNOSIS — J31.0 CHRONIC RHINITIS: ICD-10-CM

## 2019-05-09 DIAGNOSIS — L30.9 DERMATITIS: ICD-10-CM

## 2019-05-09 PROCEDURE — 99213 OFFICE O/P EST LOW 20 MIN: CPT | Performed by: FAMILY MEDICINE

## 2019-05-09 RX ORDER — MOMETASONE FUROATE MONOHYDRATE 50 UG/1
2 SPRAY, METERED NASAL DAILY
Qty: 1 BOX | Refills: 11 | Status: SHIPPED | OUTPATIENT
Start: 2019-05-09 | End: 2019-05-29

## 2019-05-09 RX ORDER — HYDROCORTISONE VALERATE CREAM 2 MG/G
CREAM TOPICAL 2 TIMES DAILY
Qty: 45 G | Refills: 0 | Status: SHIPPED | OUTPATIENT
Start: 2019-05-09 | End: 2019-05-29

## 2019-05-09 RX ORDER — FEXOFENADINE HCL 60 MG/1
180 TABLET, FILM COATED ORAL DAILY
COMMUNITY
End: 2019-09-12

## 2019-05-09 ASSESSMENT — MIFFLIN-ST. JEOR: SCORE: 1182.55

## 2019-05-09 NOTE — NURSING NOTE
Elizabet is here for itching under both eyes, possble allergies.          Pre-visit Screening:  Immunizations:  up to date  Colonoscopy:  is up to date  Mammogram: is up to date  Asthma Action Test/Plan:  NA  PHQ9:  None  GAD7:  None  Questioned patient about current smoking habits Pt. has never smoked.  Ok to leave detailed message on voice mail for today's visit only Yes, phone # [phone]

## 2019-05-09 NOTE — PROGRESS NOTES
"SUBJECTIVE:  58 year old female presents with the following concern:    Itchy sensation on cheeks, left worse than right  No dermatitis    She is having allergy symptoms: nasal  Recommended a trial of nasacort    Patient Active Problem List   Diagnosis     Panic disorder without agoraphobia     Esophageal reflux     Hypoglycemia     Undiagnosed cardiac murmurs     Calculus of kidney     Counseling for living will     Health Care Home     Encounter for counseling     ACP (advance care planning)     Symptomatic menopausal or female climacteric states     Past Surgical History:   Procedure Laterality Date     C ENLARGE BREAST  9/01     C MAMMOGRAM, SCREENING  2012     COLONOSCOPY  2010     EYE EXAM ESTABLISHED PT  2013     [facility] UGI ENDOSCOPY DIAG W OR W/O BRUSH/WASH  2001    Dr. Machuca     TEST NOT FOUND  10/2001    Normal GBUS except for liver hemangioma     Current Outpatient Medications   Medication     EPI E-Z PEN QUIRINO 1:1000  IJ     esomeprazole (NEXIUM) 40 MG DR capsule     fexofenadine (ALLEGRA) 60 MG tablet     hydrocortisone (WESTCORT) 0.2 % external cream     mometasone (NASONEX) 50 MCG/ACT nasal spray     Psyllium (METAMUCIL FIBER PO)     No current facility-administered medications for this visit.      OBJECTIVE:  /88 (BP Location: Right arm, Patient Position: Sitting, Cuff Size: Adult Regular)   Pulse 63   Temp 98.3  F (36.8  C) (Oral)   Ht 1.638 m (5' 4.5\")   Wt 62 kg (136 lb 9.6 oz)   LMP 10/14/2013   SpO2 96%   BMI 23.09 kg/m     No acute distress  External ears  and canals clear bilaterally.Right auricle is red and warm to touch. TM's normal bilaterally. Nose congested without purulent discharge. Oropharynx normal. Postnasal drainage noted Neck supple without palpable adenopathy.  Mild erythema of right cheek- no distinct rash  Regular rate and  rhythm. S1 and S2 normal, no murmurs, clicks, gallops or rubs. No edema or JVD. Chest is clear; no wheezes or rales.    Assessment    (J31.0) Chronic " rhinitis  Comment:   Plan: fexofenadine (ALLEGRA) 60 MG tablet, mometasone        (NASONEX) 50 MCG/ACT nasal spray            (L30.9) Dermatitis  Comment:   Plan: fexofenadine (ALLEGRA) 60 MG tablet,         hydrocortisone (WESTCORT) 0.2 % external cream          Call or return to clinic prn if these symtoms worsen, fail to improve as anticipated, or if new symptoms develop.

## 2019-05-29 ENCOUNTER — OFFICE VISIT (OUTPATIENT)
Dept: FAMILY MEDICINE | Facility: CLINIC | Age: 59
End: 2019-05-29

## 2019-05-29 VITALS
HEART RATE: 70 BPM | TEMPERATURE: 98.5 F | OXYGEN SATURATION: 97 % | DIASTOLIC BLOOD PRESSURE: 82 MMHG | SYSTOLIC BLOOD PRESSURE: 120 MMHG | BODY MASS INDEX: 22.75 KG/M2 | WEIGHT: 134.6 LBS

## 2019-05-29 DIAGNOSIS — R10.11 RUQ ABDOMINAL PAIN: Primary | ICD-10-CM

## 2019-05-29 PROCEDURE — 99213 OFFICE O/P EST LOW 20 MIN: CPT | Performed by: FAMILY MEDICINE

## 2019-05-29 NOTE — NURSING NOTE
Elizabet is here for RUQ pain    Pre-visit Screening:  Immunizations:  up to date  Colonoscopy:  is up to date  Mammogram: is up to date  Asthma Action Test/Plan:  NA  PHQ9:  None  GAD7:  None  Questioned patient about current smoking habits Pt. has never smoked.  Ok to leave detailed message on voice mail for today's visit only Yes, phone # [phone]

## 2019-05-29 NOTE — PROGRESS NOTES
SUBJECTIVE:  Wen Sanchez, a 58 year old female scheduled an appointment to discuss the following issues:  RUQ abdominal pain  Pt presents for persistent issues with RUQ pain-still bothering her despite previous w/u including US x 2, HIDA scan, EGD with biopsies, GI eval-recommended PT for possible muscular issue but she chose not to do this as did not seem likely to help    She was better for a period of months and now symptoms back-has been doing more twisting at work-not worse but wonders next step      Medical, social, surgical, and family histories reviewed.    Patient Active Problem List   Diagnosis     Panic disorder without agoraphobia     Esophageal reflux     Hypoglycemia     Undiagnosed cardiac murmurs     Calculus of kidney     Counseling for living will     Health Care Home     Encounter for counseling     ACP (advance care planning)     Symptomatic menopausal or female climacteric states     Past Medical History:   Diagnosis Date     Family history of diabetes mellitus     Mother and Father     Gastroesophageal reflux disease      Kidney stones      Family History   Problem Relation Age of Onset     Diabetes Mother      Cancer Sister         cervical     Heart Disease No family hx of      Lipids No family hx of      Diabetes Father      Breast Cancer No family hx of      Cancer - colorectal No family hx of      Social History     Socioeconomic History     Marital status:      Spouse name: Not on file     Number of children: 3     Years of education: 12     Highest education level: Not on file   Occupational History     Occupation:      Employer: [employer]   Social Needs     Financial resource strain: Not on file     Food insecurity:     Worry: Not on file     Inability: Not on file     Transportation needs:     Medical: Not on file     Non-medical: Not on file   Tobacco Use     Smoking status: Never Smoker     Smokeless tobacco: Never Used   Substance and Sexual Activity      Alcohol use: Yes     Alcohol/week: 2.0 oz     Types: 4 Standard drinks or equivalent per week     Comment: weekends      Drug use: No     Sexual activity: Yes     Partners: Male     Birth control/protection: Pill   Lifestyle     Physical activity:     Days per week: Not on file     Minutes per session: Not on file     Stress: Not on file   Relationships     Social connections:     Talks on phone: Not on file     Gets together: Not on file     Attends Mu-ism service: Not on file     Active member of club or organization: Not on file     Attends meetings of clubs or organizations: Not on file     Relationship status: Not on file     Intimate partner violence:     Fear of current or ex partner: Not on file     Emotionally abused: Not on file     Physically abused: Not on file     Forced sexual activity: Not on file   Other Topics Concern      Service Not Asked     Blood Transfusions Not Asked     Caffeine Concern Not Asked     Occupational Exposure Not Asked     Hobby Hazards Not Asked     Sleep Concern Not Asked     Stress Concern Not Asked     Weight Concern Not Asked     Special Diet Not Asked     Back Care Not Asked     Exercise Yes     Bike Helmet No     Seat Belt Yes     Self-Exams Yes     Parent/sibling w/ CABG, MI or angioplasty before 65F 55M? Not Asked   Social History Narrative        Functional abiltity:      Hearing imparment:No      Acitvities of daily living:Normal      Risk of falls:No      Home safety of concern:No        Do you exercise?     Yes   Times/week: 2    History of abusive relationships in past:   No    History of abusive relationships currently:    No    Do you feel emotionally and physically safe in your environment?     Yes    Do you own a gun?  No      Is the gun kept in a safe place:   NOT APPLICABLE    Do you wear a seatbelt regularly?     Yes    Do you use sun screen?     Yes         Past Surgical History:   Procedure Laterality Date     C ENLARGE BREAST  9/01     C  MAMMOGRAM, SCREENING  2012     COLONOSCOPY  2010     EYE EXAM ESTABLISHED PT  2013      UG[facility] ENDOSCOPY DIAG W OR W/O BRUSH/WASH  2001    Dr. Machuca     TEST NOT FOUND  10/2001    Normal GBUS except for liver hemangioma       Current Outpatient Medications on File Prior to Visit:  EPI E-Z PEN QUIRINO 1:1000  IJ 1 TIME ONLY   esomeprazole (NEXIUM) 40 MG DR capsule TAKE 1 CAPSULE EVERY       MORNING BEFORE BREAKFAST   30-60 MINUTES BEFORE EATING   fexofenadine (ALLEGRA) 60 MG tablet Take 180 mg by mouth daily   Psyllium (METAMUCIL FIBER PO)      No current facility-administered medications on file prior to visit.      Allergies: Sesame oil and Cephalosporins    Immunization History   Administered Date(s) Administered     Influenza (IIV3) PF 10/22/2009     Influenza Vaccine IM 3yrs+ 4 Valent IIV4 11/18/2014, 11/05/2015     TD (ADULT, 7+) 01/01/1992, 06/11/2003     TDAP Vaccine (Boostrix) 05/18/2011     Td (Adult), Adsorbed 07/05/2001, 06/11/2003        ROS:  CONSTITUTIONAL: NEGATIVE for fever, chills  EYES: NEGATIVE for vision changes   RESP: NEGATIVE for significant cough or SOB  CV: NEGATIVE for chest pain, palpitations   GI: NEGATIVE for nausea, abdominal pain, heartburn, or change in bowel habits  : NEGATIVE for frequency, dysuria, or hematuria  MUSCULOSKELETAL: NEGATIVE for significant arthralgias or myalgia  NEURO: NEGATIVE for weakness, dizziness or paresthesias or headache    OBJECTIVE:  /82 (BP Location: Left arm, Patient Position: Sitting, Cuff Size: Adult Regular)   Pulse 70   Temp 98.5  F (36.9  C) (Oral)   Wt 61.1 kg (134 lb 9.6 oz)   LMP 10/14/2013   SpO2 97%   BMI 22.75 kg/m    EXAM:  GENERAL APPEARANCE: healthy, alert and no distress  EYES: EOMI,  PERRL  HENT: ear canals and TM's normal and nose and mouth without ulcers or lesions  RESP: lungs clear to auscultation - no rales, rhonchi or wheezes  CV: regular rates and rhythm, normal S1 S2, no S3 or S4 and no murmur, click or rub -  ABDOMEN:   soft, nontender, no HSM or masses and bowel sounds normal    ASSESSMENT/PLAN:  (R10.11) RUQ abdominal pain  (primary encounter diagnosis)  Comment: Patient is having persistent symptoms despite previous therapies. I suspect PT is a good idea here-will call GI and see if they can recommend PT for this type of issue-if PT not helpful could consider surgery eval vs CT scan  Plan:

## 2019-05-30 NOTE — PROGRESS NOTES
LM with Dr Estrada's office to page Dr Beltran on his cell to discuss PT per [facility]

## 2019-06-19 NOTE — PROGRESS NOTES
[facility]: Physical Therapy Initial Evaluation   Jun 20, 2019  Subjective:   Chief Complaint:    Pain: Right upper abdominal quadrant pain deep to the anterior ribs. Can also feel it in her back.    Numbness/Tingling: None   Weakness: None   Stiffness: does have some stiffness in the right upper back/neck  New/Recurrent/Chronic: Chronic - Recurrent  DOI/onset: many years ago (20+ years ago, maybe 30) was the first episode ; this one began in march.   Referral Date: 5/31/2019 - Mike Beltran MD  Mechanism of onset: Unsure  PMH/surgical history/trauma:    - Menopausal   - Breast implants (~2002)  General health as reported by patient: Good    Medications: GERD  Occupation:  Job duties: lifting/carrying, prolonged sitting, pushing/pulling, driving, prolonged standing, repetitive tasks  Previous Treatment (Effect): Nothing, lots of work-ups, no treatment  Imaging: Ultrasound IMPRESSION:  Normal right upper quadrant ultrasound. No gallstones or bile duct dilatation.  AM/PM: no pattern  Quality of Pain: feels like a toothache  Pain: 1/10 at present, 0/10 at best, 5/10 at worst  Worse: doesn't know of any specific things,   Better: take deep breaths, rubbing it, ultrasound,   Progression of Symptoms since onset: fluctuates   Sleeping: some disturbance from neck/upper back  Current Functional Status:    - Twisting/bending - pain with twisting/bending to the right  - Breathing - sometimes has to stop and focus on breathing  Current HEP/exercise regimen: None  Transportation to Therapy: Independent with transportation  Live with Others: Michel Reveles Flags:   - Patient denies the following: Pain with Cough / Sneeze / Laughing ; Night Pain ; Fever ; Weakness ; Numbness/Tingling ;     Patient's goal(s): Get rid of it. For it to go away.      Objective:    Posture: anterior pelvic tilt    Thoracic AROM: (Major, Moderate, Minimal or Nil loss)  Movement Loss Shawn Mod Min Nil Pain    Flexion    x    Extension    x    Rotation L    x    Rotation R    x    Side Bend L    x    Side Bend R    x cc     Ribs Screen: Levels with dysfunctional ribs   Rib Level L R   Hypo in Inspiration  9,10   Hypo in Expiration     Tenderness without movement dysfunction     Hyper       Thoracic Mobility/Spring Testing: Mild / WNL    Palpation: reproduction of back symptoms with PA to the rib angle of risb 9, 10    Other:   - Able to follow the tenderness from the back to the front along the rib.   - Pain with resistance against pressure into right rotation  - Difficulty with the motor control of abdominal breathing      Assessment/Plan:    Patient is a 58 year old female with thoracic and rib complaints.    Patient has the following significant findings with corresponding treatment plan.                Referring Diagnosis: RUQ abdominal pain   PT Diagnosis: Pain long the right 10th rib, anteriorly and posteriorly  Pain -  hot/cold therapy, US, manual therapy, self management, education and home program  Decreased ROM/flexibility - manual therapy, therapeutic exercise, therapeutic activity and home program  Decreased joint mobility - manual therapy, therapeutic exercise, therapeutic activity and home program  Decreased function - therapeutic activities and home program  Impaired posture - neuro re-education, therapeutic activities and home program      Therapy Evaluation Codes:   1) History comprised of:   Personal factors that impact the plan of care:      Time since onset of symptoms.    Comorbidity factors that impact the plan of care are:      History of Rib Fracture and Acid Reflux.     Medications impacting care: None.  2) Examination of Body Systems comprised of:   Body structures and functions that impact the plan of care:      Thoracic Spine and Ribs.   Activity limitations that impact the plan of care are:      Twisting and Breathing.  3) Clinical presentation characteristics  are:   Evolving/Changing.  4) Decision-Making    Moderate complexity using standardized patient assessment instrument and/or measureable assessment of functional outcome.  Cumulative Therapy Evaluation is: Moderate complexity.    Previous and current functional limitations:  (See Goal Flow Sheet for this information)    Short term and Long term goals: (See Goal Flow Sheet for this information)     Communication ability:  Patient appears to be able to clearly communicate and understand verbal and written communication and follow directions correctly.  Treatment Explanation - The following has been discussed with the patient:   RX ordered/plan of care  Anticipated outcomes  Possible risks and side effects  This patient would benefit from PT intervention to resume normal activities.   Rehab potential is fair.    Frequency:  1 X week, once daily  Duration:  for 6 weeks  Discharge Plan:  Achieve all LTG.  Independent in home treatment program.  Reach maximal therapeutic benefit.    Please refer to the daily flowsheet for treatment today, total treatment time and time spent performing 1:1 timed codes.

## 2019-06-20 ENCOUNTER — THERAPY VISIT (OUTPATIENT)
Dept: PHYSICAL THERAPY | Facility: CLINIC | Age: 59
End: 2019-06-20
Payer: COMMERCIAL

## 2019-06-20 DIAGNOSIS — R07.81 RIB PAIN ON RIGHT SIDE: ICD-10-CM

## 2019-06-20 DIAGNOSIS — R10.11 RUQ ABDOMINAL PAIN: ICD-10-CM

## 2019-06-20 PROCEDURE — 97112 NEUROMUSCULAR REEDUCATION: CPT | Mod: GP | Performed by: PHYSICAL THERAPIST

## 2019-06-20 PROCEDURE — 97162 PT EVAL MOD COMPLEX 30 MIN: CPT | Mod: GP | Performed by: PHYSICAL THERAPIST

## 2019-06-21 PROBLEM — R07.81 RIB PAIN ON RIGHT SIDE: Status: ACTIVE | Noted: 2019-06-21

## 2019-06-27 ENCOUNTER — THERAPY VISIT (OUTPATIENT)
Dept: PHYSICAL THERAPY | Facility: CLINIC | Age: 59
End: 2019-06-27
Payer: COMMERCIAL

## 2019-06-27 DIAGNOSIS — R07.81 RIB PAIN ON RIGHT SIDE: ICD-10-CM

## 2019-06-27 PROCEDURE — 97140 MANUAL THERAPY 1/> REGIONS: CPT | Mod: GP | Performed by: PHYSICAL THERAPIST

## 2019-06-27 PROCEDURE — 97112 NEUROMUSCULAR REEDUCATION: CPT | Mod: GP | Performed by: PHYSICAL THERAPIST

## 2019-06-27 NOTE — PROGRESS NOTES
Objective:    Improved motor control of diaphragmatic breathing.    No pain today with resisted trunk rotation in either direction. Minimal pain with sidebending to the right at the start. No pain at the end. Focal tenderness along the right 9-10 intercostal space.     Anterior rib mobility with breathing is WNL.     No symptoms with lower trunk rotation or with abdominal muscle testing.     Patient reports no pain with laughing.     Overall decrease in symptoms following [place] to the deep surface of the anterior right rib.

## 2019-07-08 ENCOUNTER — OFFICE VISIT (OUTPATIENT)
Dept: FAMILY MEDICINE | Facility: CLINIC | Age: 59
End: 2019-07-08

## 2019-07-08 VITALS
BODY MASS INDEX: 21.52 KG/M2 | RESPIRATION RATE: 20 BRPM | SYSTOLIC BLOOD PRESSURE: 134 MMHG | HEART RATE: 76 BPM | TEMPERATURE: 98.6 F | WEIGHT: 129.2 LBS | DIASTOLIC BLOOD PRESSURE: 84 MMHG | HEIGHT: 65 IN

## 2019-07-08 DIAGNOSIS — R68.84 JAW PAIN: Primary | ICD-10-CM

## 2019-07-08 PROCEDURE — 99213 OFFICE O/P EST LOW 20 MIN: CPT | Performed by: FAMILY MEDICINE

## 2019-07-08 ASSESSMENT — MIFFLIN-ST. JEOR: SCORE: 1148.99

## 2019-07-08 NOTE — PROGRESS NOTES
"SUBJECTIVE:  58 year old female is accompanied to the clinic with the following concern:  One week history of bilateral jaw pain    Associated symptoms:  Shoulder and neck tension    Epigastric discomfort -extensive work up  Current treatment: PT    Restless sleep    Patient Active Problem List   Diagnosis     Panic disorder without agoraphobia     Esophageal reflux     Hypoglycemia     Undiagnosed cardiac murmurs     Calculus of kidney     Counseling for living will     Health Care Home     Encounter for counseling     ACP (advance care planning)     Symptomatic menopausal or female climacteric states     Rib pain on right side     Past Surgical History:   Procedure Laterality Date     C ENLARGE BREAST  9/01     C MAMMOGRAM, SCREENING  2012     COLONOSCOPY  2010     EYE EXAM ESTABLISHED PT  2013     [facility] UGI ENDOSCOPY DIAG W OR W/O BRUSH/WASH  2001    Dr. Machuca     TEST NOT FOUND  10/2001    Normal GBUS except for liver hemangioma     Current Outpatient Medications   Medication     EPI E-Z PEN QUIRINO 1:1000  IJ     esomeprazole (NEXIUM) 40 MG DR capsule     fexofenadine (ALLEGRA) 60 MG tablet     Psyllium (METAMUCIL FIBER PO)     No current facility-administered medications for this visit.       ROS: 7 point ROS neg other than the symptoms noted above in the HPI.    OBJECTIVE:  /84 (BP Location: Right arm, Patient Position: Chair, Cuff Size: Adult Regular)   Pulse 76   Temp 98.6  F (37  C) (Oral)   Resp 20   Ht 1.638 m (5' 4.5\")   Wt 58.6 kg (129 lb 3.2 oz)   LMP 10/14/2013   BMI 21.83 kg/m    No acute distress  Right cerumen impaction. Right TM is normal. Nose normal without lesions or discharge. Oropharynx normal.  No tooth pain with pressure.  Neck supple without palpable adenopathy.  No jaw crepitus with ROM  Full ROM of TM Joint  Regular rate and  rhythm. S1 and S2 normal, no murmurs, clicks, gallops or rubs. No edema or JVD. Chest is clear; no wheezes or rales.      Assessment     (R68.84) Jaw pain  " (primary encounter diagnosis)  Comment:    Plan:    Ibuprofen three times a day for five days  Advised to schedule a dental visit  Call or return to clinic prn if these symtoms worsen, fail to improve as anticipated, or if new symptoms develop.  Consider referral to chronic neck and facial pain clinic

## 2019-07-08 NOTE — NURSING NOTE
Wen Sanchez is here ear and neck pain for the past week.    Questioned patient about current smoking habits.  Pt. has never smoked.  PULSE regular  My Chart: active  CLASSIFICATION OF OVERWEIGHT AND OBESITY BY BMI                        Obesity Class           BMI(kg/m2)  Underweight                                    < 18.5  Normal                                         18.5-24.9  Overweight                                     25.0-29.9  OBESITY                     I                  30.0-34.9                             II                 35.0-39.9  EXTREME OBESITY             III                >40                            Patient's  BMI Body mass index is 21.83 kg/m .  http://hin.nhlbi.nih.gov/menuplanner/menu.cgi  Pre-visit planning  Immunizations - up to date  Colonoscopy - is up to date  Mammogram - is due this month  Asthma -   PHQ9 -    CLAUDIA-7 -

## 2019-07-11 ENCOUNTER — THERAPY VISIT (OUTPATIENT)
Dept: PHYSICAL THERAPY | Facility: CLINIC | Age: 59
End: 2019-07-11
Payer: COMMERCIAL

## 2019-07-11 DIAGNOSIS — R07.81 RIB PAIN ON RIGHT SIDE: ICD-10-CM

## 2019-07-11 PROCEDURE — 97110 THERAPEUTIC EXERCISES: CPT | Mod: GP | Performed by: PHYSICAL THERAPIST

## 2019-07-11 PROCEDURE — 97530 THERAPEUTIC ACTIVITIES: CPT | Mod: GP | Performed by: PHYSICAL THERAPIST

## 2019-07-11 PROCEDURE — 97140 MANUAL THERAPY 1/> REGIONS: CPT | Mod: GP | Performed by: PHYSICAL THERAPIST

## 2019-07-29 ENCOUNTER — HOSPITAL ENCOUNTER (OUTPATIENT)
Facility: CLINIC | Age: 59
Discharge: HOME | End: 2019-07-29
Attending: INTERNAL MEDICINE | Admitting: INTERNAL MEDICINE
Payer: COMMERCIAL

## 2019-07-29 VITALS
OXYGEN SATURATION: 94 % | HEIGHT: 64 IN | WEIGHT: 125 LBS | HEART RATE: 61 BPM | SYSTOLIC BLOOD PRESSURE: 104 MMHG | BODY MASS INDEX: 21.34 KG/M2 | DIASTOLIC BLOOD PRESSURE: 74 MMHG | RESPIRATION RATE: 16 BRPM

## 2019-07-29 LAB — COLONOSCOPY: NORMAL

## 2019-07-29 PROCEDURE — G0121 COLON CA SCRN NOT HI RSK IND: HCPCS | Performed by: INTERNAL MEDICINE

## 2019-07-29 PROCEDURE — 45378 DIAGNOSTIC COLONOSCOPY: CPT | Performed by: INTERNAL MEDICINE

## 2019-07-29 PROCEDURE — 25000128 H RX IP 250 OP 636: Performed by: INTERNAL MEDICINE

## 2019-07-29 PROCEDURE — G0500 MOD SEDAT ENDO SERVICE >5YRS: HCPCS | Performed by: INTERNAL MEDICINE

## 2019-07-29 RX ORDER — LIDOCAINE 40 MG/G
CREAM TOPICAL
Status: DISCONTINUED | OUTPATIENT
Start: 2019-07-29 | End: 2019-07-29 | Stop reason: HOSPADM

## 2019-07-29 RX ORDER — FENTANYL CITRATE 50 UG/ML
INJECTION, SOLUTION INTRAMUSCULAR; INTRAVENOUS PRN
Status: DISCONTINUED | OUTPATIENT
Start: 2019-07-29 | End: 2019-07-29 | Stop reason: HOSPADM

## 2019-07-29 RX ORDER — ONDANSETRON 4 MG/1
4 TABLET, ORALLY DISINTEGRATING ORAL EVERY 6 HOURS PRN
Status: DISCONTINUED | OUTPATIENT
Start: 2019-07-29 | End: 2019-07-29 | Stop reason: HOSPADM

## 2019-07-29 RX ORDER — ONDANSETRON 2 MG/ML
4 INJECTION INTRAMUSCULAR; INTRAVENOUS EVERY 6 HOURS PRN
Status: DISCONTINUED | OUTPATIENT
Start: 2019-07-29 | End: 2019-07-29 | Stop reason: HOSPADM

## 2019-07-29 RX ORDER — FLUMAZENIL 0.1 MG/ML
0.2 INJECTION, SOLUTION INTRAVENOUS
Status: DISCONTINUED | OUTPATIENT
Start: 2019-07-29 | End: 2019-07-29 | Stop reason: HOSPADM

## 2019-07-29 RX ORDER — NALOXONE HYDROCHLORIDE 0.4 MG/ML
.1-.4 INJECTION, SOLUTION INTRAMUSCULAR; INTRAVENOUS; SUBCUTANEOUS
Status: DISCONTINUED | OUTPATIENT
Start: 2019-07-29 | End: 2019-07-29 | Stop reason: HOSPADM

## 2019-07-29 RX ORDER — ONDANSETRON 2 MG/ML
4 INJECTION INTRAMUSCULAR; INTRAVENOUS
Status: DISCONTINUED | OUTPATIENT
Start: 2019-07-29 | End: 2019-07-29 | Stop reason: HOSPADM

## 2019-07-29 ASSESSMENT — MIFFLIN-ST. JEOR: SCORE: 1122

## 2019-07-29 NOTE — H&P
Pre-Endoscopy History and Physical     Wen Sanchez MRN# [id number]   YOB: 1960 Age: 58 year old     Date of Procedure: 7/29/2019  Primary care provider: Mike Beltran  Type of Endoscopy: Colonoscopy with possible biopsy, possible polypectomy  Reason for Procedure: screen  Type of Anesthesia Anticipated: Conscious Sedation    HPI:    Wen is a 58 year old female who will be undergoing the above procedure.      A history and physical has been performed. The patient's medications and allergies have been reviewed. The risks and benefits of the procedure and the sedation options and risks were discussed with the patient.  All questions were answered and informed consent was obtained.      She denies a personal or family history of anesthesia complications or bleeding disorders.     Patient Active Problem List   Diagnosis     Panic disorder without agoraphobia     Esophageal reflux     Hypoglycemia     Undiagnosed cardiac murmurs     Calculus of kidney     Counseling for living will     Health Care Home     Encounter for counseling     ACP (advance care planning)     Symptomatic menopausal or female climacteric states     Rib pain on right side        Past Medical History:   Diagnosis Date     Family history of diabetes mellitus     Mother and Father     Gastroesophageal reflux disease      Kidney stones         Past Surgical History:   Procedure Laterality Date     C ENLARGE BREAST  9/01     C MAMMOGRAM, SCREENING  2012     COLONOSCOPY  2010     EYE EXAM ESTABLISHED PT  2013     [facility] UGI ENDOSCOPY DIAG W OR W/O BRUSH/WASH  2001    Dr. Machuca     TEST NOT FOUND  10/2001    Normal GBUS except for liver hemangioma       Social History     Tobacco Use     Smoking status: Never Smoker     Smokeless tobacco: Never Used   Substance Use Topics     Alcohol use: Yes     Alcohol/week: 2.0 oz     Types: 4 Standard drinks or equivalent per week     Comment: weekends        Family History   Problem  "Relation Age of Onset     Diabetes Mother      Diabetes Father      Cancer Sister         cervical     Heart Disease No family hx of      Lipids No family hx of      Breast Cancer No family hx of      Cancer - colorectal No family hx of      Colon Cancer No family hx of        Prior to Admission medications    Medication Sig Start Date End Date Taking? Authorizing Provider   esomeprazole (NEXIUM) 40 MG DR capsule TAKE 1 CAPSULE EVERY       MORNING BEFORE BREAKFAST   30-60 MINUTES BEFORE EATING 1/4/19  Yes Mike Beltran MD   fexofenadine (ALLEGRA) 60 MG tablet Take 180 mg by mouth daily   Yes Reported, Patient   Psyllium (METAMUCIL FIBER PO)    Yes Reported, Patient   EPI E-Z PEN QUIRINO 1:1000  IJ 1 TIME ONLY 6/19/06   Manuel Valladares MD       Allergies   Allergen Reactions     Sesame Oil Anaphylaxis     Cephalosporins         REVIEW OF SYSTEMS:   5 point ROS negative except as noted above in HPI, including Gen., Resp., CV, GI &  system review.    PHYSICAL EXAM:   BP (!) 141/100   Pulse 69   Resp 18   Ht 1.626 m (5' 4\")   Wt 56.7 kg (125 lb)   LMP 10/14/2013   SpO2 98%   BMI 21.46 kg/m   Estimated body mass index is 21.46 kg/m  as calculated from the following:    Height as of this encounter: 1.626 m (5' 4\").    Weight as of this encounter: 56.7 kg (125 lb).   GENERAL APPEARANCE: alert, and oriented  MENTAL STATUS: alert  AIRWAY EXAM: Mallampatti Class I (visualization of the soft palate, fauces, uvula, anterior and posterior pillars)  RESP: lungs clear to auscultation - no rales, rhonchi or wheezes  CV: regular rates and rhythm  DIAGNOSTICS:    Not indicated    IMPRESSION   ASA Class 2 - Mild systemic disease    PLAN:   Plan for Colonoscopy with possible biopsy, possible polypectomy. We discussed the risks, benefits and alternatives and the patient wished to proceed.    The above has been forwarded to the consulting provider.      Signed Electronically by: Mike Hill  July 29, " 2019

## 2019-07-30 ENCOUNTER — TRANSFERRED RECORDS (OUTPATIENT)
Dept: FAMILY MEDICINE | Facility: CLINIC | Age: 59
End: 2019-07-30

## 2019-07-31 ENCOUNTER — TRANSFERRED RECORDS (OUTPATIENT)
Dept: FAMILY MEDICINE | Facility: CLINIC | Age: 59
End: 2019-07-31

## 2019-08-01 ENCOUNTER — HOSPITAL ENCOUNTER (OUTPATIENT)
Dept: MAMMOGRAPHY | Facility: CLINIC | Age: 59
Discharge: HOME | End: 2019-08-01
Attending: FAMILY MEDICINE | Admitting: FAMILY MEDICINE
Payer: COMMERCIAL

## 2019-08-01 DIAGNOSIS — Z12.31 SCREENING MAMMOGRAM, ENCOUNTER FOR: ICD-10-CM

## 2019-08-01 PROCEDURE — 77067 SCR MAMMO BI INCL CAD: CPT

## 2019-08-23 ENCOUNTER — HOSPITAL ENCOUNTER (OUTPATIENT)
Dept: NUCLEAR MEDICINE | Facility: CLINIC | Age: 59
Setting detail: OBSERVATION
Discharge: HOME | End: 2019-08-23
Attending: INTERNAL MEDICINE | Admitting: INTERNAL MEDICINE
Payer: COMMERCIAL

## 2019-08-23 DIAGNOSIS — R10.11 RIGHT UPPER QUADRANT PAIN: ICD-10-CM

## 2019-08-23 PROCEDURE — 34300033 ZZH RX 343: Performed by: INTERNAL MEDICINE

## 2019-08-23 PROCEDURE — A9537 TC99M MEBROFENIN: HCPCS | Performed by: INTERNAL MEDICINE

## 2019-08-23 PROCEDURE — 78227 HEPATOBIL SYST IMAGE W/DRUG: CPT

## 2019-08-23 RX ORDER — KIT FOR THE PREPARATION OF TECHNETIUM TC 99M MEBROFENIN 45 MG/10ML
6 INJECTION, POWDER, LYOPHILIZED, FOR SOLUTION INTRAVENOUS ONCE
Status: COMPLETED | OUTPATIENT
Start: 2019-08-23 | End: 2019-08-23

## 2019-08-23 RX ADMIN — MEBROFENIN 6 MCI.: 45 INJECTION, POWDER, LYOPHILIZED, FOR SOLUTION INTRAVENOUS at 08:06

## 2019-08-27 ENCOUNTER — TRANSFERRED RECORDS (OUTPATIENT)
Dept: FAMILY MEDICINE | Facility: CLINIC | Age: 59
End: 2019-08-27

## 2019-08-30 ENCOUNTER — OFFICE VISIT (OUTPATIENT)
Dept: SURGERY | Facility: CLINIC | Age: 59
End: 2019-08-30
Payer: COMMERCIAL

## 2019-08-30 ENCOUNTER — TELEPHONE (OUTPATIENT)
Dept: SURGERY | Facility: CLINIC | Age: 59
End: 2019-08-30

## 2019-08-30 VITALS
HEART RATE: 95 BPM | RESPIRATION RATE: 16 BRPM | BODY MASS INDEX: 21.34 KG/M2 | HEIGHT: 64 IN | DIASTOLIC BLOOD PRESSURE: 88 MMHG | WEIGHT: 125 LBS | OXYGEN SATURATION: 97 % | SYSTOLIC BLOOD PRESSURE: 126 MMHG

## 2019-08-30 DIAGNOSIS — R10.13 EPIGASTRIC PAIN: Primary | ICD-10-CM

## 2019-08-30 PROCEDURE — 99204 OFFICE O/P NEW MOD 45 MIN: CPT | Performed by: SURGERY

## 2019-08-30 RX ORDER — ERTAPENEM 1 G/1
1 INJECTION, POWDER, LYOPHILIZED, FOR SOLUTION INTRAMUSCULAR; INTRAVENOUS
Status: CANCELLED | OUTPATIENT
Start: 2019-08-30

## 2019-08-30 ASSESSMENT — MIFFLIN-ST. JEOR: SCORE: 1117

## 2019-08-30 NOTE — TELEPHONE ENCOUNTER
Type of surgery: LAPAROSCOPIC CHOLECYSTECTOMY   Location of surgery: [facility]  Date and time of surgery: 9-18-19  Surgeon: DR. ALVARES   Pre-Op Appt Date: PATIENT TO SCHEDULE   Post-Op Appt Date: PATIENT TO SCHEDULE    Packet sent out: GIVEN TO PATIENT   Pre-cert/Authorization completed:  Not Applicable  Date: 8-30-19       LAPAROSCOPIC CHOLECYSTECTOMY   GENERAL   PT INST TO HAVE H&P WITH DR. ROSSI   60 MINS REQ   PA ASSIST MGB   ALW

## 2019-08-30 NOTE — LETTER
August 30, 2019          Mike Estrada MD  [facility]  [address] [place] [address]      RE:   Wen Sanchez 1960      Dear Colleague,    Thank you for referring your patient, Wen Sanchez, to [facility]. Please see a copy of my visit note below.    [facility]  New Patient Office Visit        Wen Sanchez is a 59 year old female seen in consultation for epigastric abdominal pain, epigastric at the request of Mike Beltran MD.         Assessment and Plan:  It is my impression that Wen has epigastric pain of unclear origin with possible acalculous gallbladder disease. I have offered her a laparoscopic cholecystectomy.       We have discussed the indication, alternatives, risks and expected recovery.  Since she does not have any objective evidence of cholelithiasis or biliary dyskinesia, we specifically discussed at length that a cholecystectomy may not change her symptoms of epigastric pain.  Patient expressed understanding that her symptoms are atypical of gallbladder disease and would still like to proceed with cholecystectomy in hopes that this will improve her pain, or at a minimum, rule out the gallbladder as a source. We have also discussed incisions, scarring, postoperative infections, anesthesia, bleeding, blood transfusion, open conversion, common bile duct injury, injury to intra-abdominal organs, retained common bile duct stone, bile leak, DVT, PE, hernia, post cholecystectomy diarrhea, postoperative dietary restrictions and physical limitations.  We have discussed the recommended interventions and treatments for these complications.  All questions have been answered to the best of my ability.       We will schedule surgery at the patient's convenience.  Needs a preop H&P to be performed by her PCP.     Chief complaint:  Abdominal pain, epigastric     HPI:  Wen Sanchez is a 59 year old  "female who presents with intermittent epigastric pain for several years.  The pain is sometimes associated with eating fatty foods and sometimes associated with prolonged sitting. Positive for associated symptoms of nausea.  Negative for associated symptoms of vomiting, diarrhea, constipation, bloating, hematochezia, belching, fever and sweats.  She does not have a history of jaundice or dark urine.  She  has not had pancreatitis in the past.  She describes the pain as aching (\"like a toothache\") with occasional sharp stabs.  The pain often lasts several hours to days at a time.      Extensive work-up for her epigastric pain including EGD, colonoscopy, abdominal ultrasound, HIDA scan.   None of the studies have been revealing as to the cause of her epigastric pain.  Note she does not have gallstones nor does she have biliary dyskinesia (EF normal at 68%).  She did have epigastric cramping with CCK injection during her HIDA scan (although this can also happen to people with normal gallbladder function if the injection is rapid).      She is also undergoing work-up for musculoskeletal causes of her epigastric pain and underwent trigger point injection therapy.  Some of her symptoms do suggest this may be musculoskeletal in origin, including relief of pain with massaging the area.     Past Medical History:   has a past medical history of Family history of diabetes mellitus, Gastroesophageal reflux disease, and Kidney stones.     Past Surgical History:  Past Surgical History         Past Surgical History:   Procedure Laterality Date     C ENLARGE BREAST   9/01     C MAMMOGRAM, SCREENING   2012     COLONOSCOPY   2010     COLONOSCOPY N/A 7/29/2019     Procedure: COLONOSCOPY;  Surgeon: Mike Hill MD;  Location:  GI     EYE EXAM ESTABLISHED PT   2013      UGI ENDOSCOPY DIAG W OR W/O BRUSH/WASH   2001     Dr. Machuca     TEST NOT FOUND   10/2001     Normal GBUS except for liver hemangioma            Social " "History:  Social History            Tobacco Use     Smoking status: Never Smoker     Smokeless tobacco: Never Used   Substance Use Topics     Alcohol use: Yes       Alcohol/week: 2.0 oz       Types: 4 Standard drinks or equivalent per week       Comment: weekends      Drug use: No         Family History:  Family History         Family History   Problem Relation Age of Onset     Diabetes Mother       Diabetes Father       Cancer Sister           cervical     Heart Disease No family hx of       Lipids No family hx of       Breast Cancer No family hx of       Cancer - colorectal No family hx of       Colon Cancer No family hx of           No FH bleeding or clotting problems or reactions to anesthesia.     Review of Systems:  The 10 point review of systems is negative other than noted in the HPI and above.     Physical Exam:  Vitals: /88   Pulse 95   Resp 16   Ht 1.626 m (5' 4\")   Wt 56.7 kg (125 lb)   LMP 10/14/2013   SpO2 97%   BMI 21.46 kg/m    BMI= Body mass index is 21.46 kg/m .  General - Well developed, well nourished female in no apparent distress  HEENT:  Head normocephalic and atraumatic, pupils equal and round, conjunctivae clear, no scleral icterus, mucous membranes moist, external ears and nose normal  Pulmonary: Breathing unlabored  CV: Regular pulse  Abdomen: soft, flat, non-distended with mild tenderness noted in the epigastric region. No surgical incisions  Musculoskeletal:  Moves all extremities equally, arms without edema  Neurologic: alert, speech is clear, nonfocal  Psychiatric: Mood and affect appropriate  Skin: Without lesions, rashes or jaundice     Relevant labs:     WBC -         Lab Results   Component Value Date     WBC 5.4 10/21/2018         HgB -         Lab Results   Component Value Date     HGB 15.1 10/21/2018         Plt-         Lab Results   Component Value Date      10/21/2018         Liver Function Studies -       Recent Labs   Lab Test 10/21/18  0850   PROTTOTAL " 8.1   ALBUMIN 4.1   BILITOTAL 0.5   ALKPHOS 98   AST 19   ALT 29         Lipase-         Lab Results   Component Value Date     LIPASE 209 10/21/2018         Imaging:  All imaging studies reviewed by me.     Ultrasound 10/21/2018: negative cholelithiasis, negative gallbladder wall thickening, negative ductal dilatation, negative pericholecystic fluid, negative sonographic Mcknight's sign.  HIDA 8/23/19: Gallbladder ejection fraction is normal at 68%.The patient reported cramping and heartburn pain and right upper quadrant pain during the exam.  EGD 11/17/17: No abnormal findings. H. pylori and celiac sprue negative.  Colonoscopy 7/29/2019:   - Diverticulosis in the mid transverse colon.   - The examination was otherwise normal on direct and retroflexion views.   - No specimens collected.          Recent Results (from the past 744 hour(s))   MA Screen w Implants Digital Bilat     Narrative     SCREENING MAMMOGRAM, BILATERAL, DIGITAL w/CAD - 8/1/2019 8:16 AM.     BREAST SYMPTOMS: No current breast complaints.      COMPARISON:  7/30/2018, 11/21/2013.     BREAST DENSITY: Scattered fibroglandular densities.     COMMENTS: No findings of suspicion for malignancy.   There are  bilateral intact saline implants in place.         Impression     IMPRESSION: BI-RADS CATEGORY: 2 - Benign.     RECOMMENDED FOLLOW-UP: Annual Mammography.  Recommend routine annual screening mammography.     Exam results letter mailed to patient.                       RANDAL NASSAR MD   NM Hepatobiliary Scan w GB EF     Narrative     NUCLEAR MEDICINE HEPATOBILIARY SCAN WITH GALLBLADDER EJECTION FRACTION    8/23/2019 10:26 AM      TECHNIQUE:  The patient was injected with 6 mCi Tc-99m mebrofenin IV.  The patient was given 8 ounces per oral Nepro at 45 minutes.  Dynamic  imaging over biliary system was performed. Region of interest placed  over the gallbladder to calculate ejection fraction.     HISTORY:  Right upper quadrant pain.     COMPARISON:    Nuclear Study: HIDA scan 11/1/2017.     Other Relevant Studies: None.     FINDINGS:  Radiotracer uptake identified within the gallbladder,  biliary tree, liver, and bowel. Gallbladder ejection fraction is 68%.  The patient reported cramping and heartburn pain and right upper  quadrant pain during the exam.        Impression     IMPRESSION:  1. No acute cholecystitis or biliary obstruction.  2. Gallbladder ejection fraction is 68%.  3. The patient reported abdominal pain and cramping during the exam.     GENEVIEVE CAO MD         Again, thank you for allowing me to participate in the care of your patient.      Sincerely,      Elena Rico MD

## 2019-08-30 NOTE — PROGRESS NOTES
[facility]  New Patient Office Visit      Wen Sanchez is a 59 year old female seen in consultation for epigastric abdominal pain, epigastric at the request of Mike Beltran MD.       Assessment and Plan:  It is my impression that Wen has epigastric pain of unclear origin with possible acalculous gallbladder disease. I have offered her a laparoscopic cholecystectomy.      We have discussed the indication, alternatives, risks and expected recovery.  Since she does not have any objective evidence of cholelithiasis or biliary dyskinesia, we specifically discussed at length that a cholecystectomy may not change her symptoms of epigastric pain.  Patient expressed understanding that her symptoms are atypical of gallbladder disease and would still like to proceed with cholecystectomy in hopes that this will improve her pain, or at a minimum, rule out the gallbladder as a source. We have also discussed incisions, scarring, postoperative infections, anesthesia, bleeding, blood transfusion, open conversion, common bile duct injury, injury to intra-abdominal organs, retained common bile duct stone, bile leak, DVT, PE, hernia, post cholecystectomy diarrhea, postoperative dietary restrictions and physical limitations.  We have discussed the recommended interventions and treatments for these complications.  All questions have been answered to the best of my ability.       We will schedule surgery at the patient's convenience.  Needs a preop H&P to be performed by her PCP.    Chief complaint:  Abdominal pain, epigastric    HPI:  Wen Sanchez is a 59 year old female who presents with intermittent epigastric pain for several years.  The pain is sometimes associated with eating fatty foods and sometimes associated with prolonged sitting. Positive for associated symptoms of nausea.  Negative for associated symptoms of vomiting, diarrhea, constipation, bloating, hematochezia, belching, fever and  "sweats.  She does not have a history of jaundice or dark urine.  She  has not had pancreatitis in the past.  She describes the pain as aching (\"like a toothache\") with occasional sharp stabs.  The pain often lasts several hours to days at a time.     Extensive work-up for her epigastric pain including EGD, colonoscopy, abdominal ultrasound, HIDA scan.   None of the studies have been revealing as to the cause of her epigastric pain.  Note she does not have gallstones nor does she have biliary dyskinesia (EF normal at 68%).  She did have epigastric cramping with CCK injection during her HIDA scan (although this can also happen to people with normal gallbladder function if the injection is rapid).     She is also undergoing work-up for musculoskeletal causes of her epigastric pain and underwent trigger point injection therapy.  Some of her symptoms do suggest this may be musculoskeletal in origin, including relief of pain with massaging the area.    Past Medical History:   has a past medical history of Family history of diabetes mellitus, Gastroesophageal reflux disease, and Kidney stones.    Past Surgical History:  Past Surgical History:   Procedure Laterality Date     C ENLARGE BREAST  9/01     C MAMMOGRAM, SCREENING  2012     COLONOSCOPY  2010     COLONOSCOPY N/A 7/29/2019    Procedure: COLONOSCOPY;  Surgeon: Mike Hill MD;  Location:  GI     EYE EXAM ESTABLISHED PT  2013      UGI ENDOSCOPY DIAG W OR W/O BRUSH/WASH  2001    Dr. Machuca     TEST NOT FOUND  10/2001    Normal GBUS except for liver hemangioma       Social History:  Social History     Tobacco Use     Smoking status: Never Smoker     Smokeless tobacco: Never Used   Substance Use Topics     Alcohol use: Yes     Alcohol/week: 2.0 oz     Types: 4 Standard drinks or equivalent per week     Comment: weekends      Drug use: No        Family History:  Family History   Problem Relation Age of Onset     Diabetes Mother      Diabetes Father      Cancer " "Sister         cervical     Heart Disease No family hx of      Lipids No family hx of      Breast Cancer No family hx of      Cancer - colorectal No family hx of      Colon Cancer No family hx of      No FH bleeding or clotting problems or reactions to anesthesia.    Review of Systems:  The 10 point review of systems is negative other than noted in the HPI and above.    Physical Exam:  Vitals: /88   Pulse 95   Resp 16   Ht 1.626 m (5' 4\")   Wt 56.7 kg (125 lb)   LMP 10/14/2013   SpO2 97%   BMI 21.46 kg/m    BMI= Body mass index is 21.46 kg/m .  General - Well developed, well nourished female in no apparent distress  HEENT:  Head normocephalic and atraumatic, pupils equal and round, conjunctivae clear, no scleral icterus, mucous membranes moist, external ears and nose normal  Pulmonary: Breathing unlabored  CV: Regular pulse  Abdomen: soft, flat, non-distended with mild tenderness noted in the epigastric region. No surgical incisions  Musculoskeletal:  Moves all extremities equally, arms without edema  Neurologic: alert, speech is clear, nonfocal  Psychiatric: Mood and affect appropriate  Skin: Without lesions, rashes or jaundice    Relevant labs:    WBC -   Lab Results   Component Value Date    WBC 5.4 10/21/2018       HgB -   Lab Results   Component Value Date    HGB 15.1 10/21/2018       Plt-   Lab Results   Component Value Date     10/21/2018       Liver Function Studies -   Recent Labs   Lab Test 10/21/18  0850   PROTTOTAL 8.1   ALBUMIN 4.1   BILITOTAL 0.5   ALKPHOS 98   AST 19   ALT 29       Lipase-   Lab Results   Component Value Date    LIPASE 209 10/21/2018       Imaging:  All imaging studies reviewed by me.    Ultrasound 10/21/2018: negative cholelithiasis, negative gallbladder wall thickening, negative ductal dilatation, negative pericholecystic fluid, negative sonographic Mcknight's sign.  HIDA 8/23/19: Gallbladder ejection fraction is normal at 68%.The patient reported cramping and " heartburn pain and right upper quadrant pain during the exam.  EGD 11/17/17: No abnormal findings. H. pylori and celiac sprue negative.  Colonoscopy 7/29/2019:   - Diverticulosis in the mid transverse colon.   - The examination was otherwise normal on direct and retroflexion views.   - No specimens collected.     Recent Results (from the past 744 hour(s))   MA Screen w Implants Digital Bilat    Narrative    SCREENING MAMMOGRAM, BILATERAL, DIGITAL w/CAD - 8/1/2019 8:16 AM.    BREAST SYMPTOMS: No current breast complaints.     COMPARISON:  7/30/2018, 11/21/2013.    BREAST DENSITY: Scattered fibroglandular densities.    COMMENTS: No findings of suspicion for malignancy.   There are  bilateral intact saline implants in place.       Impression    IMPRESSION: BI-RADS CATEGORY: 2 - Benign.    RECOMMENDED FOLLOW-UP: Annual Mammography.  Recommend routine annual screening mammography.    Exam results letter mailed to patient.                RANDAL NASSAR MD   NM Hepatobiliary Scan w GB EF    Narrative    NUCLEAR MEDICINE HEPATOBILIARY SCAN WITH GALLBLADDER EJECTION FRACTION    8/23/2019 10:26 AM     TECHNIQUE:  The patient was injected with 6 mCi Tc-99m mebrofenin IV.  The patient was given 8 ounces per oral Nepro at 45 minutes.  Dynamic  imaging over biliary system was performed. Region of interest placed  over the gallbladder to calculate ejection fraction.    HISTORY:  Right upper quadrant pain.    COMPARISON:   Nuclear Study: HIDA scan 11/1/2017.    Other Relevant Studies: None.    FINDINGS:  Radiotracer uptake identified within the gallbladder,  biliary tree, liver, and bowel. Gallbladder ejection fraction is 68%.  The patient reported cramping and heartburn pain and right upper  quadrant pain during the exam.      Impression    IMPRESSION:  1. No acute cholecystitis or biliary obstruction.  2. Gallbladder ejection fraction is 68%.  3. The patient reported abdominal pain and cramping during the exam.    GENEVIEVE CAO MD        This note was created using voice recognition software. Undetected word substitutions or other errors may have occurred.     Time spent with the patient with greater that 50% of the time in discussion was 20 minutes.     Elena Rico MD  [facility] [place]    Please route or send letter to:  Referring Provider

## 2019-09-11 ENCOUNTER — OFFICE VISIT (OUTPATIENT)
Dept: FAMILY MEDICINE | Facility: CLINIC | Age: 59
End: 2019-09-11

## 2019-09-11 VITALS
BODY MASS INDEX: 21.46 KG/M2 | OXYGEN SATURATION: 97 % | DIASTOLIC BLOOD PRESSURE: 72 MMHG | SYSTOLIC BLOOD PRESSURE: 112 MMHG | HEIGHT: 64 IN | TEMPERATURE: 98.2 F | HEART RATE: 52 BPM

## 2019-09-11 DIAGNOSIS — Z01.818 PREOPERATIVE EXAMINATION: Primary | ICD-10-CM

## 2019-09-11 DIAGNOSIS — R10.11 ABDOMINAL PAIN, RIGHT UPPER QUADRANT: ICD-10-CM

## 2019-09-11 LAB — HEMOGLOBIN: 14.6 G/DL (ref 11.7–15.7)

## 2019-09-11 PROCEDURE — 99214 OFFICE O/P EST MOD 30 MIN: CPT | Performed by: FAMILY MEDICINE

## 2019-09-11 PROCEDURE — 36415 COLL VENOUS BLD VENIPUNCTURE: CPT | Performed by: FAMILY MEDICINE

## 2019-09-11 PROCEDURE — 85018 HEMOGLOBIN: CPT | Performed by: FAMILY MEDICINE

## 2019-09-11 NOTE — NURSING NOTE
Chief Complaint   Patient presents with     Pre-Op Exam     Cholescystectomy, [facility], 9/18/19, Dr. Rico

## 2019-09-11 NOTE — PROGRESS NOTES
[facility]  [address]  [address]  [facility] [address]  [phone]  Dept: [phone]    PRE-OP EVALUATION:  Today's date: 2019    Wen Sanchez (: 1960) presents for pre-operative evaluation assessment as requested by Dr. Rico.  She requires evaluation and anesthesia risk assessment prior to undergoing surgery/procedure for treatment of Cholecystectomy .    Proposed Surgery/ Procedure: Cholecystectomy  Date of Surgery/ Procedure: 19  Time of Surgery/ Procedure: 6am arrival Hospital/Surgical Facility: [facility]    Primary Physician: Mike Beltran  Type of Anesthesia Anticipated: to be determined    Patient has a Health Care Directive or Living Will:  NO    1. NO - Do you have a history of heart attack, stroke, stent, bypass or surgery on an artery in the head, neck, heart or legs?  2. NO - Do you ever have any pain or discomfort in your chest?  3. NO - Do you have a history of  Heart Failure?  4. NO - Are you troubled by shortness of breath when: walking on the level, up a slight hill or at night?  5. NO - Do you currently have a cold, bronchitis or other respiratory infection?  6. NO - Do you have a cough, shortness of breath or wheezing?  7. NO - Do you sometimes get pains in the calves of your legs when you walk?  8. NO - Do you or anyone in your family have previous history of blood clots?  9. NO - Do you or does anyone in your family have a serious bleeding problem such as prolonged bleeding following surgeries or cuts?  10. NO - Have you ever had problems with anemia or been told to take iron pills?  11. NO - Have you had any abnormal blood loss such as black, tarry or bloody stools, or abnormal vaginal bleeding?  12. NO - Have you ever had a blood transfusion?  13. NO - Have you or any of your relatives ever had problems with anesthesia?  14. NO - Do you have sleep apnea, excessive snoring or daytime drowsiness?  15. NO - Do you have  any prosthetic heart valves?  16. NO - Do you have prosthetic joints?  17. NO - Is there any chance that you may be pregnant?      HPI:     HPI related to upcoming procedure: ongoing RUQ abd pain      See problem list for active medical problems.  Problems all longstanding and stable, except as noted/documented.  See ROS for pertinent symptoms related to these conditions.      MEDICAL HISTORY:     Patient Active Problem List   Diagnosis     Panic disorder without agoraphobia     Esophageal reflux     Hypoglycemia     Undiagnosed cardiac murmurs     Calculus of kidney     Counseling for living will     Health Care Home     Encounter for counseling     ACP (advance care planning)     Symptomatic menopausal or female climacteric states     Rib pain on right side       Past Medical History:   Diagnosis Date     Family history of diabetes mellitus     Mother and Father     Gastroesophageal reflux disease      Kidney stones      Past Surgical History:   Procedure Laterality Date     C ENLARGE BREAST  9/01     C MAMMOGRAM, SCREENING  2012     COLONOSCOPY  2010     COLONOSCOPY N/A 7/29/2019    Procedure: COLONOSCOPY;  Surgeon: Mike Hill MD;  Location:  GI     EYE EXAM ESTABLISHED PT  2013      UGI ENDOSCOPY DIAG W OR W/O BRUSH/WASH  2001    Dr. Machuca     TEST NOT FOUND  10/2001    Normal GBUS except for liver hemangioma     Current Outpatient Medications   Medication Sig Dispense Refill     esomeprazole (NEXIUM) 40 MG DR capsule TAKE 1 CAPSULE EVERY       MORNING BEFORE BREAKFAST   30-60 MINUTES BEFORE EATING 90 capsule 3     Psyllium (METAMUCIL FIBER PO)        EPI E-Z PEN QUIRINO 1:1000  IJ 1 TIME ONLY 1 2     fexofenadine (ALLEGRA) 60 MG tablet Take 180 mg by mouth daily       OTC products: None, except as noted above    Allergies   Allergen Reactions     Sesame Oil Anaphylaxis     Cephalosporins       Latex Allergy: NO    Social History     Tobacco Use     Smoking status: Never Smoker     Smokeless tobacco:  Never Used   Substance Use Topics     Alcohol use: Yes     Alcohol/week: 2.0 oz     Types: 4 Standard drinks or equivalent per week     Comment: weekends      History   Drug Use No       REVIEW OF SYSTEMS:   CONSTITUTIONAL: NEGATIVE for fever, chills, change in weight  ENT/MOUTH: NEGATIVE for ear, mouth and throat problems  RESP: NEGATIVE for significant cough or SOB  CV: NEGATIVE for chest pain, palpitations or peripheral edema    EXAM:   [facility] 10/14/2013   GENERAL APPEARANCE: healthy, alert and no distress  HENT: ear canals and TM's normal and nose and mouth without ulcers or lesions  RESP: lungs clear to auscultation - no rales, rhonchi or wheezes  CV: regular rate and rhythm, normal S1 S2, no S3 or S4 and no murmur, click or rub   ABDOMEN: soft, nontender, no HSM or masses and bowel sounds normal  NEURO: Normal strength and tone, sensory exam grossly normal, mentation intact and speech normal    DIAGNOSTICS:     EKG: Not indicated due to non-vascular surgery and low risk of event (age <65 and without cardiac risk factors)  Labs Resulted Today:   Results for orders placed or performed in visit on 09/11/19   CL AFF HEMOGLOBIN (BFP)   Result Value Ref Range    Hemoglobin 14.6 11.7 - 15.7 g/dL       Recent Labs   Lab Test 10/21/18  0850 08/16/17  1902 08/16/17  1854   HGB 15.1  --  14.0     --  269    141  --    POTASSIUM 3.4 3.9  --    CR 0.74 0.92  --         IMPRESSION:   Reason for surgery/procedure: ongoing RUQ abd pain    The proposed surgical procedure is considered INTERMEDIATE risk.    REVISED CARDIAC RISK INDEX  The patient has the following serious cardiovascular risks for perioperative complications such as (MI, PE, VFib and 3  AV Block):  No serious cardiac risks  INTERPRETATION: 0 risks: Class I (very low risk - 0.4% complication rate)    The patient has the following additional risks for perioperative complications:  No identified additional risks      ICD-10-CM    1. Preoperative  examination Z01.818    2. Abdominal pain, right upper quadrant R10.11        RECOMMENDATIONS:         --Patient is on no chronic medications    APPROVAL GIVEN to proceed with proposed procedure, without further diagnostic evaluation       Signed Electronically by: Mike Beltran MD    Copy of this evaluation report is provided to requesting physician.    [place] Preop Guidelines    Revised Cardiac Risk Index

## 2019-09-12 RX ORDER — IBUPROFEN 600 MG/1
600 TABLET, FILM COATED ORAL EVERY 6 HOURS PRN
Status: ON HOLD | COMMUNITY
End: 2019-09-18

## 2019-09-12 RX ORDER — CALCIUM CARBONATE 500 MG/1
1 TABLET, CHEWABLE ORAL 2 TIMES DAILY
COMMUNITY
End: 2020-12-29

## 2019-09-12 ASSESSMENT — MIFFLIN-ST. JEOR: SCORE: 1135.14

## 2019-09-18 ENCOUNTER — APPOINTMENT (OUTPATIENT)
Dept: SURGERY | Facility: PHYSICIAN GROUP | Age: 59
End: 2019-09-18
Payer: COMMERCIAL

## 2019-09-18 ENCOUNTER — ANESTHESIA (OUTPATIENT)
Dept: SURGERY | Facility: CLINIC | Age: 59
End: 2019-09-18
Payer: COMMERCIAL

## 2019-09-18 ENCOUNTER — ANESTHESIA EVENT (OUTPATIENT)
Dept: SURGERY | Facility: CLINIC | Age: 59
End: 2019-09-18
Payer: COMMERCIAL

## 2019-09-18 ENCOUNTER — HOSPITAL ENCOUNTER (OUTPATIENT)
Facility: CLINIC | Age: 59
Discharge: HOME | End: 2019-09-18
Attending: SURGERY | Admitting: SURGERY
Payer: COMMERCIAL

## 2019-09-18 VITALS
SYSTOLIC BLOOD PRESSURE: 107 MMHG | HEIGHT: 64 IN | TEMPERATURE: 97.2 F | RESPIRATION RATE: 18 BRPM | BODY MASS INDEX: 22.02 KG/M2 | HEART RATE: 55 BPM | OXYGEN SATURATION: 98 % | DIASTOLIC BLOOD PRESSURE: 74 MMHG | WEIGHT: 129 LBS

## 2019-09-18 DIAGNOSIS — K82.9 GALLBLADDER DISEASE: Primary | ICD-10-CM

## 2019-09-18 PROCEDURE — 27210794 ZZH OR GENERAL SUPPLY STERILE: Performed by: SURGERY

## 2019-09-18 PROCEDURE — 37000009 ZZH ANESTHESIA TECHNICAL FEE, EACH ADDTL 15 MIN: Performed by: SURGERY

## 2019-09-18 PROCEDURE — 25000128 H RX IP 250 OP 636: Performed by: SURGERY

## 2019-09-18 PROCEDURE — 71000013 ZZH RECOVERY PHASE 1 LEVEL 1 EA ADDTL HR: Performed by: SURGERY

## 2019-09-18 PROCEDURE — 25000125 ZZHC RX 250: Performed by: NURSE ANESTHETIST, CERTIFIED REGISTERED

## 2019-09-18 PROCEDURE — 47562 LAPAROSCOPIC CHOLECYSTECTOMY: CPT | Performed by: SURGERY

## 2019-09-18 PROCEDURE — 37000008 ZZH ANESTHESIA TECHNICAL FEE, 1ST 30 MIN: Performed by: SURGERY

## 2019-09-18 PROCEDURE — 40000306 ZZH STATISTIC PRE PROC ASSESS II: Performed by: SURGERY

## 2019-09-18 PROCEDURE — 36000056 ZZH SURGERY LEVEL 3 1ST 30 MIN: Performed by: SURGERY

## 2019-09-18 PROCEDURE — 88304 TISSUE EXAM BY PATHOLOGIST: CPT | Performed by: SURGERY

## 2019-09-18 PROCEDURE — 71000027 ZZH RECOVERY PHASE 2 EACH 15 MINS: Performed by: SURGERY

## 2019-09-18 PROCEDURE — 25800030 ZZH RX IP 258 OP 636: Performed by: ANESTHESIOLOGY

## 2019-09-18 PROCEDURE — 25000128 H RX IP 250 OP 636: Performed by: NURSE ANESTHETIST, CERTIFIED REGISTERED

## 2019-09-18 PROCEDURE — 25800030 ZZH RX IP 258 OP 636: Performed by: NURSE ANESTHETIST, CERTIFIED REGISTERED

## 2019-09-18 PROCEDURE — 25000128 H RX IP 250 OP 636: Performed by: ANESTHESIOLOGY

## 2019-09-18 PROCEDURE — 71000012 ZZH RECOVERY PHASE 1 LEVEL 1 FIRST HR: Performed by: SURGERY

## 2019-09-18 PROCEDURE — 47562 LAPAROSCOPIC CHOLECYSTECTOMY: CPT | Mod: AS | Performed by: PHYSICIAN ASSISTANT

## 2019-09-18 PROCEDURE — 88304 TISSUE EXAM BY PATHOLOGIST: CPT | Mod: 26 | Performed by: SURGERY

## 2019-09-18 PROCEDURE — 36000058 ZZH SURGERY LEVEL 3 EA 15 ADDTL MIN: Performed by: SURGERY

## 2019-09-18 PROCEDURE — 25000132 ZZH RX MED GY IP 250 OP 250 PS 637: Performed by: ANESTHESIOLOGY

## 2019-09-18 RX ORDER — IBUPROFEN 600 MG/1
600 TABLET, FILM COATED ORAL EVERY 6 HOURS PRN
Qty: 50 TABLET | Refills: 0 | Status: SHIPPED | OUTPATIENT
Start: 2019-09-18 | End: 2020-06-03

## 2019-09-18 RX ORDER — LIDOCAINE HYDROCHLORIDE 10 MG/ML
INJECTION, SOLUTION INFILTRATION; PERINEURAL PRN
Status: DISCONTINUED | OUTPATIENT
Start: 2019-09-18 | End: 2019-09-18

## 2019-09-18 RX ORDER — HYDROMORPHONE HYDROCHLORIDE 1 MG/ML
.3-.5 INJECTION, SOLUTION INTRAMUSCULAR; INTRAVENOUS; SUBCUTANEOUS EVERY 10 MIN PRN
Status: DISCONTINUED | OUTPATIENT
Start: 2019-09-18 | End: 2019-09-18 | Stop reason: HOSPADM

## 2019-09-18 RX ORDER — FENTANYL CITRATE 50 UG/ML
INJECTION, SOLUTION INTRAMUSCULAR; INTRAVENOUS PRN
Status: DISCONTINUED | OUTPATIENT
Start: 2019-09-18 | End: 2019-09-18

## 2019-09-18 RX ORDER — DEXAMETHASONE SODIUM PHOSPHATE 4 MG/ML
INJECTION, SOLUTION INTRA-ARTICULAR; INTRALESIONAL; INTRAMUSCULAR; INTRAVENOUS; SOFT TISSUE PRN
Status: DISCONTINUED | OUTPATIENT
Start: 2019-09-18 | End: 2019-09-18

## 2019-09-18 RX ORDER — BUPIVACAINE HYDROCHLORIDE 5 MG/ML
INJECTION, SOLUTION EPIDURAL; INTRACAUDAL PRN
Status: DISCONTINUED | OUTPATIENT
Start: 2019-09-18 | End: 2019-09-18 | Stop reason: HOSPADM

## 2019-09-18 RX ORDER — ACETAMINOPHEN 325 MG/1
975 TABLET ORAL ONCE
Status: COMPLETED | OUTPATIENT
Start: 2019-09-18 | End: 2019-09-18

## 2019-09-18 RX ORDER — NALOXONE HYDROCHLORIDE 0.4 MG/ML
.1-.4 INJECTION, SOLUTION INTRAMUSCULAR; INTRAVENOUS; SUBCUTANEOUS
Status: DISCONTINUED | OUTPATIENT
Start: 2019-09-18 | End: 2019-09-18 | Stop reason: HOSPADM

## 2019-09-18 RX ORDER — SODIUM CHLORIDE, SODIUM LACTATE, POTASSIUM CHLORIDE, CALCIUM CHLORIDE 600; 310; 30; 20 MG/100ML; MG/100ML; MG/100ML; MG/100ML
INJECTION, SOLUTION INTRAVENOUS CONTINUOUS
Status: DISCONTINUED | OUTPATIENT
Start: 2019-09-18 | End: 2019-09-18 | Stop reason: HOSPADM

## 2019-09-18 RX ORDER — ACETAMINOPHEN 325 MG/1
650 TABLET ORAL
Status: DISCONTINUED | OUTPATIENT
Start: 2019-09-18 | End: 2019-09-18 | Stop reason: HOSPADM

## 2019-09-18 RX ORDER — PROPOFOL 10 MG/ML
INJECTION, EMULSION INTRAVENOUS PRN
Status: DISCONTINUED | OUTPATIENT
Start: 2019-09-18 | End: 2019-09-18

## 2019-09-18 RX ORDER — FENTANYL CITRATE 50 UG/ML
25-50 INJECTION, SOLUTION INTRAMUSCULAR; INTRAVENOUS
Status: DISCONTINUED | OUTPATIENT
Start: 2019-09-18 | End: 2019-09-18 | Stop reason: HOSPADM

## 2019-09-18 RX ORDER — NEOSTIGMINE METHYLSULFATE 1 MG/ML
VIAL (ML) INJECTION PRN
Status: DISCONTINUED | OUTPATIENT
Start: 2019-09-18 | End: 2019-09-18

## 2019-09-18 RX ORDER — ONDANSETRON 2 MG/ML
4 INJECTION INTRAMUSCULAR; INTRAVENOUS EVERY 30 MIN PRN
Status: DISCONTINUED | OUTPATIENT
Start: 2019-09-18 | End: 2019-09-18 | Stop reason: HOSPADM

## 2019-09-18 RX ORDER — GABAPENTIN 300 MG/1
300 CAPSULE ORAL ONCE
Status: COMPLETED | OUTPATIENT
Start: 2019-09-18 | End: 2019-09-18

## 2019-09-18 RX ORDER — PROMETHAZINE HYDROCHLORIDE 25 MG/ML
25 INJECTION INTRAMUSCULAR; INTRAVENOUS ONCE
Status: COMPLETED | OUTPATIENT
Start: 2019-09-18 | End: 2019-09-18

## 2019-09-18 RX ORDER — GLYCOPYRROLATE 0.2 MG/ML
INJECTION, SOLUTION INTRAMUSCULAR; INTRAVENOUS PRN
Status: DISCONTINUED | OUTPATIENT
Start: 2019-09-18 | End: 2019-09-18

## 2019-09-18 RX ORDER — LIDOCAINE 40 MG/G
CREAM TOPICAL
Status: DISCONTINUED | OUTPATIENT
Start: 2019-09-18 | End: 2019-09-18 | Stop reason: HOSPADM

## 2019-09-18 RX ORDER — LIDOCAINE HYDROCHLORIDE 40 MG/ML
SOLUTION TOPICAL PRN
Status: DISCONTINUED | OUTPATIENT
Start: 2019-09-18 | End: 2019-09-18

## 2019-09-18 RX ORDER — MEPERIDINE HYDROCHLORIDE 50 MG/ML
12.5 INJECTION INTRAMUSCULAR; INTRAVENOUS; SUBCUTANEOUS
Status: DISCONTINUED | OUTPATIENT
Start: 2019-09-18 | End: 2019-09-18 | Stop reason: HOSPADM

## 2019-09-18 RX ORDER — FENTANYL CITRATE 50 UG/ML
25-50 INJECTION, SOLUTION INTRAMUSCULAR; INTRAVENOUS EVERY 5 MIN PRN
Status: DISCONTINUED | OUTPATIENT
Start: 2019-09-18 | End: 2019-09-18 | Stop reason: HOSPADM

## 2019-09-18 RX ORDER — ONDANSETRON 2 MG/ML
INJECTION INTRAMUSCULAR; INTRAVENOUS PRN
Status: DISCONTINUED | OUTPATIENT
Start: 2019-09-18 | End: 2019-09-18

## 2019-09-18 RX ORDER — OXYCODONE HYDROCHLORIDE 5 MG/1
5-10 TABLET ORAL EVERY 4 HOURS PRN
Qty: 12 TABLET | Refills: 0 | Status: SHIPPED | OUTPATIENT
Start: 2019-09-18 | End: 2019-11-04

## 2019-09-18 RX ORDER — ACETAMINOPHEN 325 MG/1
1000 TABLET ORAL EVERY 6 HOURS PRN
Qty: 50 TABLET | Refills: 0 | Status: SHIPPED | OUTPATIENT
Start: 2019-09-18 | End: 2020-06-03

## 2019-09-18 RX ORDER — SENNA AND DOCUSATE SODIUM 50; 8.6 MG/1; MG/1
1 TABLET, FILM COATED ORAL 2 TIMES DAILY PRN
Qty: 20 TABLET | Refills: 0 | Status: SHIPPED | OUTPATIENT
Start: 2019-09-18 | End: 2019-11-04

## 2019-09-18 RX ORDER — OXYCODONE HYDROCHLORIDE 5 MG/1
10 TABLET ORAL
Status: DISCONTINUED | OUTPATIENT
Start: 2019-09-18 | End: 2019-09-18 | Stop reason: HOSPADM

## 2019-09-18 RX ORDER — LEVOFLOXACIN 5 MG/ML
500 INJECTION, SOLUTION INTRAVENOUS EVERY 24 HOURS
Status: DISCONTINUED | OUTPATIENT
Start: 2019-09-18 | End: 2019-09-18 | Stop reason: HOSPADM

## 2019-09-18 RX ORDER — ONDANSETRON 4 MG/1
4 TABLET, ORALLY DISINTEGRATING ORAL EVERY 30 MIN PRN
Status: DISCONTINUED | OUTPATIENT
Start: 2019-09-18 | End: 2019-09-18 | Stop reason: HOSPADM

## 2019-09-18 RX ADMIN — ROCURONIUM BROMIDE 40 MG: 10 INJECTION INTRAVENOUS at 07:38

## 2019-09-18 RX ADMIN — Medication 3 MG: at 08:34

## 2019-09-18 RX ADMIN — FENTANYL CITRATE 25 MCG: 50 INJECTION INTRAMUSCULAR; INTRAVENOUS at 10:25

## 2019-09-18 RX ADMIN — DEXAMETHASONE SODIUM PHOSPHATE 4 MG: 4 INJECTION, SOLUTION INTRA-ARTICULAR; INTRALESIONAL; INTRAMUSCULAR; INTRAVENOUS; SOFT TISSUE at 07:38

## 2019-09-18 RX ADMIN — LIDOCAINE HYDROCHLORIDE 30 MG: 10 INJECTION, SOLUTION INFILTRATION; PERINEURAL at 07:38

## 2019-09-18 RX ADMIN — PHENYLEPHRINE HYDROCHLORIDE 100 MCG: 10 INJECTION INTRAVENOUS at 07:46

## 2019-09-18 RX ADMIN — PROPOFOL 100 MG: 10 INJECTION, EMULSION INTRAVENOUS at 07:38

## 2019-09-18 RX ADMIN — PHENYLEPHRINE HYDROCHLORIDE 100 MCG: 10 INJECTION INTRAVENOUS at 08:09

## 2019-09-18 RX ADMIN — GABAPENTIN 300 MG: 300 CAPSULE ORAL at 06:39

## 2019-09-18 RX ADMIN — SODIUM CHLORIDE, POTASSIUM CHLORIDE, SODIUM LACTATE AND CALCIUM CHLORIDE: 600; 310; 30; 20 INJECTION, SOLUTION INTRAVENOUS at 08:17

## 2019-09-18 RX ADMIN — LEVOFLOXACIN 500 MG: 5 INJECTION, SOLUTION INTRAVENOUS at 07:32

## 2019-09-18 RX ADMIN — PROMETHAZINE HYDROCHLORIDE 25 MG: 25 INJECTION INTRAMUSCULAR; INTRAVENOUS at 11:24

## 2019-09-18 RX ADMIN — FENTANYL CITRATE 150 MCG: 50 INJECTION, SOLUTION INTRAMUSCULAR; INTRAVENOUS at 07:38

## 2019-09-18 RX ADMIN — FENTANYL CITRATE 50 MCG: 50 INJECTION INTRAMUSCULAR; INTRAVENOUS at 08:56

## 2019-09-18 RX ADMIN — PROCHLORPERAZINE EDISYLATE 10 MG: 5 INJECTION INTRAMUSCULAR; INTRAVENOUS at 10:21

## 2019-09-18 RX ADMIN — ONDANSETRON HYDROCHLORIDE 4 MG: 2 INJECTION, SOLUTION INTRAVENOUS at 08:19

## 2019-09-18 RX ADMIN — SODIUM CHLORIDE, POTASSIUM CHLORIDE, SODIUM LACTATE AND CALCIUM CHLORIDE: 600; 310; 30; 20 INJECTION, SOLUTION INTRAVENOUS at 10:40

## 2019-09-18 RX ADMIN — FENTANYL CITRATE 50 MCG: 50 INJECTION, SOLUTION INTRAMUSCULAR; INTRAVENOUS at 08:25

## 2019-09-18 RX ADMIN — GLYCOPYRROLATE 0.3 MG: 0.2 INJECTION, SOLUTION INTRAMUSCULAR; INTRAVENOUS at 08:33

## 2019-09-18 RX ADMIN — SODIUM CHLORIDE, POTASSIUM CHLORIDE, SODIUM LACTATE AND CALCIUM CHLORIDE: 600; 310; 30; 20 INJECTION, SOLUTION INTRAVENOUS at 07:31

## 2019-09-18 RX ADMIN — ACETAMINOPHEN 975 MG: 325 TABLET, FILM COATED ORAL at 06:38

## 2019-09-18 RX ADMIN — ONDANSETRON HYDROCHLORIDE 4 MG: 2 INJECTION, SOLUTION INTRAMUSCULAR; INTRAVENOUS at 09:19

## 2019-09-18 RX ADMIN — LIDOCAINE HYDROCHLORIDE 4 ML: 40 SOLUTION TOPICAL at 07:40

## 2019-09-18 RX ADMIN — MIDAZOLAM 2 MG: 1 INJECTION INTRAMUSCULAR; INTRAVENOUS at 07:31

## 2019-09-18 ASSESSMENT — MIFFLIN-ST. JEOR: SCORE: 1135.14

## 2019-09-18 ASSESSMENT — ENCOUNTER SYMPTOMS
DYSRHYTHMIAS: 0
SEIZURES: 0

## 2019-09-18 ASSESSMENT — COPD QUESTIONNAIRES: COPD: 0

## 2019-09-18 NOTE — ANESTHESIA POSTPROCEDURE EVALUATION
Patient: Wen HUYNH Goyette    Procedure(s):  CHOLECYSTECTOMY, LAPAROSCOPIC    Diagnosis:Right upper quadrant pain  Diagnosis Additional Information: No value filed.    Anesthesia Type:  General, ETT    Note:  Anesthesia Post Evaluation    Patient location during evaluation: Phase 2  Patient participation: Able to fully participate in evaluation  Level of consciousness: awake and alert  Pain management: adequate  Airway patency: patent  Cardiovascular status: acceptable  Respiratory status: acceptable  Hydration status: acceptable  PONV: controlled             Last vitals:  Vitals:    09/18/19 1055 09/18/19 1100 09/18/19 1106   BP: 100/62 101/65    Pulse: 52 55    Resp: 13 13 26   Temp:   98.2  F (36.8  C)   SpO2: 95% 96% 96%         Electronically Signed By: Hemant Sadler MD  September 18, 2019  11:15 AM

## 2019-09-18 NOTE — DISCHARGE INSTRUCTIONS
Maximum acetaminophen (Tylenol) dose from all sources should not exceed 4 grams (4000 mg) per day. You had 975mg pre-op        GENERAL ANESTHESIA OR SEDATION ADULT DISCHARGE INSTRUCTIONS   SPECIAL PRECAUTIONS FOR 24 HOURS AFTER SURGERY    IT IS NOT UNUSUAL TO FEEL LIGHT-HEADED OR FAINT, UP TO 24 HOURS AFTER SURGERY OR WHILE TAKING PAIN MEDICATION.  IF YOU HAVE THESE SYMPTOMS; SIT FOR A FEW MINUTES BEFORE STANDING AND HAVE SOMEONE ASSIST YOU WHEN YOU GET UP TO WALK OR USE THE BATHROOM.    YOU SHOULD REST AND RELAX FOR THE NEXT 24 HOURS AND YOU MUST MAKE ARRANGEMENTS TO HAVE SOMEONE STAY WITH YOU FOR AT LEAST 24 HOURS AFTER YOUR DISCHARGE.  AVOID HAZARDOUS AND STRENUOUS ACTIVITIES.  DO NOT MAKE IMPORTANT DECISIONS FOR 24 HOURS.    DO NOT DRIVE ANY VEHICLE OR OPERATE MECHANICAL EQUIPMENT FOR 24 HOURS FOLLOWING THE END OF YOUR SURGERY.  EVEN THOUGH YOU MAY FEEL NORMAL, YOUR REACTIONS MAY BE AFFECTED BY THE MEDICATION YOU HAVE RECEIVED.    DO NOT DRINK ALCOHOLIC BEVERAGES FOR 24 HOURS FOLLOWING YOUR SURGERY.    DRINK CLEAR LIQUIDS (APPLE JUICE, GINGER ALE, 7-UP, BROTH, ETC.).  PROGRESS TO YOUR REGULAR DIET AS YOU FEEL ABLE.    YOU MAY HAVE A DRY MOUTH, A SORE THROAT, MUSCLES ACHES OR TROUBLE SLEEPING.  THESE SHOULD GO AWAY AFTER 24 HOURS.    CALL YOUR DOCTOR FOR ANY OF THE FOLLOWING:  SIGNS OF INFECTION (FEVER, GROWING TENDERNESS AT THE SURGERY SITE, A LARGE AMOUNT OF DRAINAGE OR BLEEDING, SEVERE PAIN, FOUL-SMELLING DRAINAGE, REDNESS OR SWELLING.    IT HAS BEEN OVER 8 TO 10 HOURS SINCE SURGERY AND YOU ARE STILL NOT ABLE TO URINATE (PASS WATER).

## 2019-09-18 NOTE — ANESTHESIA PREPROCEDURE EVALUATION
Anesthesia Pre-Procedure Evaluation    Patient: Wen Sanchez   MRN: [id number] : 1960          Preoperative Diagnosis: Right upper quadrant pain    Procedure(s):  CHOLECYSTECTOMY, LAPAROSCOPIC    Past Medical History:   Diagnosis Date     Family history of diabetes mellitus     Mother and Father     Gastroesophageal reflux disease      Kidney stones      Past Surgical History:   Procedure Laterality Date     C ENLARGE BREAST       C MAMMOGRAM, SCREENING       COLONOSCOPY       COLONOSCOPY N/A 2019    Procedure: COLONOSCOPY;  Surgeon: Mike Hill MD;  Location:  GI     EYE EXAM ESTABLISHED PT        [facility] ENDOSCOPY DIAG W OR W/O BRUSH/WASH      Dr. Machuca     TEST NOT FOUND  10/2001    Normal GBUS except for liver hemangioma     Anesthesia Evaluation     . Pt has had prior anesthetic. Type: General    No history of anesthetic complications          ROS/MED HX    ENT/Pulmonary:  - neg pulmonary ROS    (-) asthma, COPD and sleep apnea   Neurologic:  - neg neurologic ROS    (-) seizures, CVA and Neuropathy   Cardiovascular:  - neg cardiovascular ROS   (+) ----. : . . . :. . Previous cardiac testing date:results:date: results:  Stress ECHO. The patient exhibited no chest pain during exercise.  Pt had brief 2/10 chest pain (left side) during recovery that resolved prior  to Pt leaving.  Target Heart Rate was achieved.  The Duke treadmill score was low risk ( >5 Duke score).  This was a normal stress echocardiogram with no evidence of stress-induced  ischemia.  Left ventricular cavity size decreases with exercise.  Global LV systolic function augments with exercise.  Normal resting wall motion and no stress-induced wall motion abnormality.  The visual ejection fraction is estimated at >70%. date: results: date: results:         (-) hypertension, syncope and arrhythmias   METS/Exercise Tolerance:  >4 METS   Hematologic:  - neg hematologic  ROS      (-) anemia ( 14.6)  "  Musculoskeletal:   (+) arthritis,  -       GI/Hepatic:     (+) GERD Asymptomatic on medication, cholecystitis/cholelithiasis,       Renal/Genitourinary:     (+) Nephrolithiasis ,       Endo:  - neg endo ROS       Psychiatric:     (+) psychiatric history anxiety      Infectious Disease:  - neg infectious disease ROS       Malignancy:      - no malignancy   Other:    (+) No chance of pregnancy no H/O Chronic Pain,                        Physical Exam  Normal systems: cardiovascular, pulmonary and dental    Airway   Mallampati: I  TM distance: >3 FB  Neck ROM: full    Dental     Cardiovascular       Pulmonary             Lab Results   Component Value Date    WBC 5.4 10/21/2018    HGB 14.6 09/11/2019    HCT 45.4 10/21/2018     10/21/2018    SED 24 12/16/2002     10/21/2018    POTASSIUM 3.4 10/21/2018    CHLORIDE 107 10/21/2018    CO2 26 10/21/2018    BUN 19 10/21/2018    CR 0.74 10/21/2018     (H) 10/21/2018    CHILANGO 9.2 10/21/2018    ALBUMIN 4.1 10/21/2018    PROTTOTAL 8.1 10/21/2018    ALT 29 10/21/2018    AST 19 10/21/2018    ALKPHOS 98 10/21/2018    BILITOTAL 0.5 10/21/2018    BILIDIRECT 0.1 03/25/2006    LIPASE 209 10/21/2018    TSH 1.06 05/19/2011    T4 0.9 05/19/2011    HCG Negative 02/10/2008       Preop Vitals  BP Readings from Last 3 Encounters:   09/18/19 124/86   09/11/19 112/72   08/30/19 126/88    Pulse Readings from Last 3 Encounters:   09/11/19 52   08/30/19 95   07/29/19 61      Resp Readings from Last 3 Encounters:   09/18/19 8   08/30/19 16   07/29/19 16    SpO2 Readings from Last 3 Encounters:   09/18/19 98%   09/11/19 97%   08/30/19 97%      Temp Readings from Last 1 Encounters:   09/18/19 98.7  F (37.1  C) (Temporal)    Ht Readings from Last 1 Encounters:   09/18/19 1.626 m (5' 4\")      Wt Readings from Last 1 Encounters:   09/18/19 58.5 kg (129 lb)    Estimated body mass index is 22.14 kg/m  as calculated from the following:    Height as of this encounter: 1.626 m (5' 4\").    " Weight as of this encounter: 58.5 kg (129 lb).       Anesthesia Plan      History & Physical Review  History and physical reviewed and following examination; no interval change.    ASA Status:  2 .    NPO Status:  > 8 hours    Plan for General and ETT (LTA) with Intravenous induction. Maintenance will be Inhalation.    PONV prophylaxis:  Ondansetron (or other 5HT-3) and Dexamethasone or Solumedrol       Postoperative Care  Postoperative pain management:  IV analgesics, Oral pain medications and Multi-modal analgesia.      Consents  Anesthetic plan, risks, benefits and alternatives discussed with:  Patient..                 Hemant Sadler MD                    .

## 2019-09-18 NOTE — OP NOTE
General Surgery Operative Note    PREOPERATIVE DIAGNOSIS:  Right upper quadrant pain    POSTOPERATIVE DIAGNOSIS:  Same    PROCEDURE:  Laparoscopic Cholecystectomy     ANESTHESIA:  General.    PREOPERATIVE MEDICATIONS:  Ancef IV.    SURGEON:  Elena Rico MD    ASSISTANT:  Chey Rodriguez PA-C    ESTIMATED BLOOD LOSS:  10 ml    INDICATIONS:  Wen Sanchez is a 59 year old female who has been experiencing episodes of RUQ abdominal pain for the past several years associated with nausea.  Abdominal imaging has was unrevealing, however she has undergone extensive work-up with no other other likely cause of her pain.  She now presents for laparoscopic cholecystectomy after having risks and benefits reviewed in detail.    PROCEDURE:   An infraumbilical incision was made and the abdomen was entered using a Whit trocar technique.  Secondary trocars were placed under laparoscopic guidance.  Upon inspection of the right upper quadrant, she was found to have a gallbladder fundus which extended well beyond the liver edge (a phrygian cap variant) as well as an omental adhesion extending to the right upper quadrant.  This configuration could have led to intermittent folding of her gallbladder it may explain her atypical symptoms.  Using electrocautery, took down the omental adhesion to the right upper quadrant. We proceeded in the usual fashion first finding the gallbladder neck cystic duct junction.  The cystic duct was then identified and cleared of inflammatory adhesions. The cystic artery was similarly identified.  Once a critical window of safety was achieved, the cystic duct was triply clipped and divided.  The cystic artery was also triply clipped and divided. The gallbladder was removed from the gallbladder bed using coag cautery. The gallbladder was extracted from the infraumbilical site in an EndoCatch bag.  The camera was repositioned and the right upper quadrant inspected. Trocar sites were then infiltrated  with 0.5% Marcaine plain. The infraumbilical fascial opening was closed with interrupted 0 Vicryl. The skin was closed using 4-0 subcuticular vicryl. Steri-Strips were placed on the incisions. The patient was transferred to recovery in good condition.      INTRAOPERATIVE FINDINGS: No gallbladder inflammation. Gallbladder fundus extended well beyond the liver edge (a phrygian cap variant), omental adhesion extending to the right upper quadrant.      Specimens:   ID Type Source Tests Collected by Time Destination   A : GALLBLADDER AND CONTENTS Tissue Gallbladder and Contents SURGICAL PATHOLOGY EXAM Elena Rico MD 9/18/2019  8:19 AM        Elena Rico MD

## 2019-09-19 LAB — COPATH REPORT: NORMAL

## 2019-09-27 ENCOUNTER — HEALTH MAINTENANCE LETTER (OUTPATIENT)
Age: 59
End: 2019-09-27

## 2019-09-27 ENCOUNTER — TELEPHONE (OUTPATIENT)
Dept: SURGERY | Facility: CLINIC | Age: 59
End: 2019-09-27

## 2019-09-27 NOTE — TELEPHONE ENCOUNTER
S/p  jacy wells 9/18/19  Surgeon:      Patient reports that she notes new pain in upper R abdomen today - rates at 3/10.  She also notes some mild nausea as well, but no emesis.  No fever or chills and she denies any swelling, redness or drainage at incision site.   States she is having normal BM's.  Pt reports that yesterday she did sweep and scrub her kitchen floor on her hands and knees yesterday.  She has taken ibuprofen intermittently which has improved her symptoms.    Patient is to decrease activity and rest more frequently over the weekend.  Cold pack to area as well as ibuprofen alternating with Tylenol.  Take ibuprofen with food to minimize GI side effects.  Soft bland diet and increase fluids.  She will monitor and call clinic if worsening or new s/s.  Post-op appointment is scheduled for 10/1/19 with Otis García PA-C.    Patient verbalizes understanding and agrees with plan.

## 2019-09-27 NOTE — TELEPHONE ENCOUNTER
Elizabet had surgery last week with Dr Rico, she said she woke up today with pain where her gall bladder was and is nauseated. she does not have a fever. please give her a call back thanks Celia

## 2019-10-01 ENCOUNTER — OFFICE VISIT (OUTPATIENT)
Dept: SURGERY | Facility: CLINIC | Age: 59
End: 2019-10-01
Payer: COMMERCIAL

## 2019-10-01 VITALS
BODY MASS INDEX: 22.02 KG/M2 | OXYGEN SATURATION: 95 % | RESPIRATION RATE: 16 BRPM | HEART RATE: 62 BPM | DIASTOLIC BLOOD PRESSURE: 78 MMHG | SYSTOLIC BLOOD PRESSURE: 122 MMHG | HEIGHT: 64 IN | WEIGHT: 129 LBS

## 2019-10-01 DIAGNOSIS — Z09 SURGICAL FOLLOWUP VISIT: Primary | ICD-10-CM

## 2019-10-01 PROCEDURE — 99024 POSTOP FOLLOW-UP VISIT: CPT | Performed by: PHYSICIAN ASSISTANT

## 2019-10-01 ASSESSMENT — MIFFLIN-ST. JEOR: SCORE: 1135.14

## 2019-10-01 NOTE — PROGRESS NOTES
[facility] Note     Subjective:  Wen Sanchez is here for her first postoperative visit. She underwent a laparoscopic cholecystectomy by Dr. Rico on 9/18/19. Interoperative findings include a large gallbladder fundus that extended well beyond the liver edge (a phrygian cap variant). This could explain her atypical symptoms. Today she tells me she has been feeling well since surgery. She had one episode of diarrhea which she contributes to a fatty meal the night before. She currently does not need pain medications, she is eating a normal diet and her bowels are regular. She has no concerns today.    Objective:  Abd - soft, mild RUQ tenderness, non-distended  Inc - c/d/i, no erythema, +healing ridge, no masses    Assessment:  S/p laparoscopic cholecystectomy. The pathology confirms chronic cholecystitis and a cholesterol polyp. No stones were identified.    Plan:  Activity restrictions reviewed  Could consider trial off of Nexium but I will leave that up to her primary care provider  Call/RTC PRN      Otis García PA-C  10/1/2019      Please route or send letter to:  Primary Care Provider (PCP) and Referring Provider

## 2019-10-10 ENCOUNTER — TELEPHONE (OUTPATIENT)
Dept: SURGERY | Facility: CLINIC | Age: 59
End: 2019-10-10

## 2019-10-10 NOTE — TELEPHONE ENCOUNTER
Patient is requesting to wait 1 more week to return to work (10-21-19), spoke with Dr. Rico and she said the patient should be able to return to work on 10-14-19, but she will ok 1 more week but not any longer as she should be able to return without any problems. Patient was notified and if she needs a note she will call back.

## 2019-10-24 ENCOUNTER — TRANSFERRED RECORDS (OUTPATIENT)
Dept: FAMILY MEDICINE | Facility: CLINIC | Age: 59
End: 2019-10-24

## 2019-11-04 ENCOUNTER — OFFICE VISIT (OUTPATIENT)
Dept: FAMILY MEDICINE | Facility: CLINIC | Age: 59
End: 2019-11-04

## 2019-11-04 VITALS
OXYGEN SATURATION: 96 % | WEIGHT: 129 LBS | TEMPERATURE: 97.8 F | DIASTOLIC BLOOD PRESSURE: 80 MMHG | BODY MASS INDEX: 22.14 KG/M2 | SYSTOLIC BLOOD PRESSURE: 118 MMHG | HEART RATE: 56 BPM

## 2019-11-04 DIAGNOSIS — R10.11 ABDOMINAL PAIN, RIGHT UPPER QUADRANT: Primary | ICD-10-CM

## 2019-11-04 PROCEDURE — 99214 OFFICE O/P EST MOD 30 MIN: CPT | Performed by: FAMILY MEDICINE

## 2019-11-04 PROCEDURE — 36415 COLL VENOUS BLD VENIPUNCTURE: CPT | Performed by: FAMILY MEDICINE

## 2019-11-04 PROCEDURE — 80076 HEPATIC FUNCTION PANEL: CPT | Performed by: FAMILY MEDICINE

## 2019-11-04 NOTE — PROGRESS NOTES
SUBJECTIVE:  59 year old female presents with the following concern:    Lap cholecystectomy 9/18/2019 for epigastric pain-  Path report: no gallstones or inflammation  Evaluation of her epigastric pain also included consult with KAROLYN Quinn-    Takes chronic PPI for GERD    Her complaint:  She has returned to work, long hours - up to sixty hours a week  Since returning to work, she complains of intermittent nausea (? Food trigger)   Nausea with certain foods-    RUQ pain returned three days ago (resolved with surgery  She continues to take 40 mg Nexium daily- she has not tried to lower her dose    Dr Rico: [facility]    Problem List, medical history and medications reviewed     ROS: 10 point ROS neg other than the symptoms noted above in the HPI.  Normal bowel movements-     OBJECTIVE:  /80 (BP Location: Right arm, Patient Position: Sitting, Cuff Size: Adult Regular)   Pulse 56   Temp 97.8  F (36.6  C)   Wt 58.5 kg (129 lb)   LMP 10/14/2013   SpO2 96%   BMI 22.14 kg/m    No acute distress  Regular rate and  Rhythm.  no murmurs,  . No edema or JVD. Chest is clear; no wheezes or rales.  The abdomen is soft : mild tenderness RUQ No guarding, mass or organomegaly. Bowel sounds are normal. No CVA tenderness.    Assessment    (R10.11) Abdominal pain, right upper quadrant  (primary encounter diagnosis)  Comment:  Normal liver function  Plan: Hepatic Panel ([facility]), VENOUS COLLECTION    PLAN:  Monitor food triggers for nausea  Trial of antacids- continue PPI  Consultation with general surgeon for ? Surgical etiology for discomfort

## 2019-11-04 NOTE — NURSING NOTE
Chief Complaint   Patient presents with     Consult     low right side adbominal  pain, nausea this weekend, gallbladder removed 5-6 weeks ago and that same pain is back     Pre-visit Screening:  Immunizations:  up to date  Colonoscopy:  is up to date  Mammogram: is up to date  Asthma Action Test/Plan:  NA  PHQ9:  NA  GAD7:  NA  Questioned patient about current smoking habits Pt. has never smoked.  Ok to leave detailed message on voice mail for today's visit only yes, phone # [phone]

## 2019-11-05 ENCOUNTER — OFFICE VISIT (OUTPATIENT)
Dept: SURGERY | Facility: CLINIC | Age: 59
End: 2019-11-05
Payer: COMMERCIAL

## 2019-11-05 VITALS
SYSTOLIC BLOOD PRESSURE: 128 MMHG | DIASTOLIC BLOOD PRESSURE: 86 MMHG | OXYGEN SATURATION: 98 % | HEIGHT: 64 IN | BODY MASS INDEX: 22.02 KG/M2 | RESPIRATION RATE: 16 BRPM | HEART RATE: 86 BPM | WEIGHT: 129 LBS

## 2019-11-05 DIAGNOSIS — Z09 SURGICAL FOLLOWUP VISIT: Primary | ICD-10-CM

## 2019-11-05 LAB
ALBUMIN SERPL-MCNC: 4.6 G/DL (ref 3.6–5.1)
ALP SERPL-CCNC: 82 U/L (ref 33–130)
ALT 1742-6: 8 U/L (ref 5–30)
AST 1920-8: 8 U/L (ref 7–31)
BILIRUB SERPL-MCNC: 0.5 MG/DL (ref 0.2–1.2)
BILIRUBIN DIRECT: 0.2 MG/DL (ref 0.1–0.4)
PROT SERPL-MCNC: 7.1 G/DL (ref 6.1–8.1)

## 2019-11-05 PROCEDURE — 99024 POSTOP FOLLOW-UP VISIT: CPT | Performed by: SURGERY

## 2019-11-05 ASSESSMENT — MIFFLIN-ST. JEOR: SCORE: 1135.14

## 2019-11-05 NOTE — LETTER
2019    RE: Wen Sanchez, : 1961      Subjective:  Wen is here for her second postoperative visit. She underwent laparoscopic cholecystectomy for acalculous gallbladder disease on 10/19/19. She returns at the recommendation of her primary care physician for new onset of nausea and pain approximately 8 days ago.  She had been feeling quite well after cholecystectomy and thought that her chronic symptoms were gone. However, a little over a week ago, she noticed that she was feeling nauseated throughout the day now and having intermittent epigastric pain as well. Pain/nausea are relieved with rest and bowel movements, which have been somewhat loose (baseline, uses metamucil) and 1-2 times daily. She has continued using nexium at her usual dose. LFTs were normal yesterday.     Objective:  Abd - soft, non-tender, non-distended  Inc(s) - laparoscopic incisions c/d/i, healing well, no erythema, +normal healing ridge, no masses, no hernia with valsalva     Plan:  - Recommend returning to [facility] for management of functional dyspepsia vs. further workup. She does not have any evidence of a post-operative complication (bile leak, hernia, etc).   - Symptoms are atypical of post-cholecystectomy diarrhea, so I don't think she would benefit from cholestyramine.  She could perform a trial of a low fat diet, however, I am not confident that this will help her.   - No further activity restrictions.   - RTC PRN     Elena Rico MD

## 2019-11-05 NOTE — PATIENT INSTRUCTIONS
Take antacids if pain or nausea    Call Dr Rico's office and see if she can see you      Pay attention if there is a specific food that is causing you problems

## 2019-11-05 NOTE — PROGRESS NOTES
Subjective:  Wen is here for her second postoperative visit. She underwent laparoscopic cholecystectomy for acalculous gallbladder disease on 10/19/19. She returns at the recommendation of her primary care physician for new onset of nausea and pain approximately 8 days ago.  She had been feeling quite well after cholecystectomy and thought that her chronic symptoms were gone. However, a little over a week ago, she noticed that she was feeling nauseated throughout the day now and having intermittent epigastric pain as well. Pain/nausea are relieved with rest and bowel movements, which have been somewhat loose (baseline, uses metamucil) and 1-2 times daily. She has continued using nexium at her usual dose. LFTs were normal yesterday.    Objective:  Abd - soft, non-tender, non-distended  Inc(s) - laparoscopic incisions c/d/i, healing well, no erythema, +normal healing ridge, no masses, no hernia with valsalva    Plan:  - Recommend returning to [facility] for management of functional dyspepsia vs. further workup. She does not have any evidence of a post-operative complication (bile leak, hernia, etc).   - Symptoms are atypical of post-cholecystectomy diarrhea, so I don't think she would benefit from cholestyramine.  She could perform a trial of a low fat diet, however, I am not confident that this will help her.   - No further activity restrictions.   - RTC PRN    Elena Rico MD    Please route or send letter to:  Primary Care Provider (PCP) and Referring Provider

## 2019-11-18 ENCOUNTER — TRANSFERRED RECORDS (OUTPATIENT)
Dept: FAMILY MEDICINE | Facility: CLINIC | Age: 59
End: 2019-11-18

## 2019-12-02 PROBLEM — R07.81 RIB PAIN ON RIGHT SIDE: Status: RESOLVED | Noted: 2019-06-21 | Resolved: 2019-12-02

## 2019-12-02 NOTE — PROGRESS NOTES
DISCHARGE REPORT    Updated as of December 2, 2019  Progress reporting period is from Jun 20, 2019 to Jul 11, 2019.       SUBJECTIVE  Subjective changes noted by patient:  Unknown. Patient has failed to return to clinic.    Current pain level is unknown. Patient has failed to return to clinic.  .     Initial Pain level: 5/10.   Changes in function:  Unknown. Patient has failed to return to clinic.  Adverse reaction to treatment or activity: Unknown. Patient has failed to return to clinic.    OBJECTIVE  Changes noted in objective findings:  Patient has failed to return to therapy so current objective findings are unknown.    ASSESSMENT/PLAN  Updated problem list and treatment plan: Diagnosis 1:  RUQ abdominal pain   STG/LTGs have been met or progress has been made towards goals:  Unknown. Patient has failed to return to clinic.  Assessment of Progress: The patient has not returned to therapy. Current status is unknown.  Self Management Plans:  Patient has been instructed in a home treatment program.  Patient continues to require the following intervention to meet STG and LTG's:  Unknown. Patient has failed to return to clinic.    Recommendations:  Unable to make an accurate recommendation at this time. Patient has failed to return to clinic.    Please refer to the daily flowsheet for treatment today, total treatment time and time spent performing 1:1 timed codes.

## 2020-03-10 ENCOUNTER — TELEPHONE (OUTPATIENT)
Dept: FAMILY MEDICINE | Facility: CLINIC | Age: 60
End: 2020-03-10

## 2020-03-10 NOTE — TELEPHONE ENCOUNTER
Pt called stating she thinks she may have coronavirus or something since traveling to [place]. She stated her symptoms are cold like symptoms; runny nose, cough, some aches, she isn't sure if she had a fever, but now she is experiencing some teeth aching. Advised her to treat her symptoms, rest, push fluids, mucinex, delsym. Advised her if she thinks it may be sinus infection she can be seen, however if she wants to be tested for Coronavirus she needs to go to ER. She was wondering if she should stay home from work, I advised her this is up to her we cannot make that choice.

## 2020-03-12 ENCOUNTER — TRANSFERRED RECORDS (OUTPATIENT)
Dept: FAMILY MEDICINE | Facility: CLINIC | Age: 60
End: 2020-03-12

## 2020-03-13 ENCOUNTER — OFFICE VISIT (OUTPATIENT)
Dept: FAMILY MEDICINE | Facility: CLINIC | Age: 60
End: 2020-03-13

## 2020-03-13 VITALS
TEMPERATURE: 97.6 F | HEIGHT: 64 IN | RESPIRATION RATE: 20 BRPM | DIASTOLIC BLOOD PRESSURE: 84 MMHG | SYSTOLIC BLOOD PRESSURE: 126 MMHG | BODY MASS INDEX: 21.92 KG/M2 | WEIGHT: 128.4 LBS | HEART RATE: 64 BPM

## 2020-03-13 DIAGNOSIS — J06.9 UPPER RESPIRATORY TRACT INFECTION, UNSPECIFIED TYPE: Primary | ICD-10-CM

## 2020-03-13 DIAGNOSIS — Z71.89 ACP (ADVANCE CARE PLANNING): ICD-10-CM

## 2020-03-13 PROCEDURE — 99213 OFFICE O/P EST LOW 20 MIN: CPT | Performed by: FAMILY MEDICINE

## 2020-03-13 ASSESSMENT — MIFFLIN-ST. JEOR: SCORE: 1132.42

## 2020-03-13 NOTE — PROGRESS NOTES
SUBJECTIVE:   Wen Sanchez is a 59 year old female who complains of nasal congestion, runny nose, earache, cough and some hot flashes for 7 days. She denies a history of productive cough, sweats, chills, myalgias, shortness of breath and vomiting and denies a history of asthma. Patient does not smoke cigarettes.    Pt notes left upper teeth pain    Pt does get some nighttime     Patient Active Problem List   Diagnosis     Esophageal reflux     Hypoglycemia     Undiagnosed cardiac murmurs     Calculus of kidney     Counseling for living will     Health Care Home     Encounter for counseling     ACP (advance care planning)     Symptomatic menopausal or female climacteric states     Past Medical History:   Diagnosis Date     Family history of diabetes mellitus     Mother and Father     Gastroesophageal reflux disease      Kidney stones      Family History   Problem Relation Age of Onset     Diabetes Mother      Diabetes Father      Cancer Sister         cervical     Heart Disease No family hx of      Lipids No family hx of      Breast Cancer No family hx of      Cancer - colorectal No family hx of      Colon Cancer No family hx of      Social History     Socioeconomic History     Marital status:      Spouse name: Not on file     Number of children: 3     Years of education: 12     Highest education level: Not on file   Occupational History     Occupation:      Employer: [employer]   Social Needs     Financial resource strain: Not on file     Food insecurity     Worry: Not on file     Inability: Not on file     Transportation needs     Medical: Not on file     Non-medical: Not on file   Tobacco Use     Smoking status: Never Smoker     Smokeless tobacco: Never Used   Substance and Sexual Activity     Alcohol use: Yes     Alcohol/week: 3.3 standard drinks     Types: 4 Standard drinks or equivalent per week     Comment: weekends 3 drinks at most     Drug use: No     Sexual activity: Yes     Partners:  Male     Birth control/protection: Pill   Lifestyle     Physical activity     Days per week: Not on file     Minutes per session: Not on file     Stress: Not on file   Relationships     Social connections     Talks on phone: Not on file     Gets together: Not on file     Attends Gnosticism service: Not on file     Active member of club or organization: Not on file     Attends meetings of clubs or organizations: Not on file     Relationship status: Not on file     Intimate partner violence     Fear of current or ex partner: Not on file     Emotionally abused: Not on file     Physically abused: Not on file     Forced sexual activity: Not on file   Other Topics Concern      Service Not Asked     Blood Transfusions Not Asked     Caffeine Concern Not Asked     Occupational Exposure Not Asked     Hobby Hazards Not Asked     Sleep Concern Not Asked     Stress Concern Not Asked     Weight Concern Not Asked     Special Diet Not Asked     Back Care Not Asked     Exercise Yes     Bike Helmet No     Seat Belt Yes     Self-Exams Yes     Parent/sibling w/ CABG, MI or angioplasty before 65F 55M? Not Asked   Social History Narrative        Functional abiltity:      Hearing imparment:No      Acitvities of daily living:Normal      Risk of falls:No      Home safety of concern:No        Do you exercise?     Yes   Times/week: 2    History of abusive relationships in past:   No    History of abusive relationships currently:    No    Do you feel emotionally and physically safe in your environment?     Yes    Do you own a gun?  No      Is the gun kept in a safe place:   NOT APPLICABLE    Do you wear a seatbelt regularly?     Yes    Do you use sun screen?     Yes         Past Surgical History:   Procedure Laterality Date     C ENLARGE BREAST  9/01     C MAMMOGRAM, SCREENING  2012     COLONOSCOPY  2010     COLONOSCOPY N/A 7/29/2019    Procedure: COLONOSCOPY;  Surgeon: Mike Hill MD;  Location:  GI     EYE EXAM ESTABLISHED PT  " 2013      UGI ENDOSCOPY DIAG W OR W/O BRUSH/WASH  2001    Dr. Machuca     LAPAROSCOPIC CHOLECYSTECTOMY N/A 9/18/2019    Procedure: CHOLECYSTECTOMY, LAPAROSCOPIC;  Surgeon: Elena Rico MD;  Location: [facility] OR     TEST NOT FOUND  10/2001    Normal GBUS except for liver hemangioma     acetaminophen (TYLENOL) 325 MG tablet, Take 3 tablets (975 mg) by mouth every 6 hours as needed for pain  calcium carbonate (TUMS) 500 MG chewable tablet, Take 1 chew tab by mouth 2 times daily  EPI E-Z PEN QUIRINO 1:1000  IJ, 1 TIME ONLY  esomeprazole (NEXIUM) 40 MG DR capsule, TAKE 1 CAPSULE EVERY       MORNING BEFORE BREAKFAST   30-60 MINUTES BEFORE EATING  ibuprofen (ADVIL/MOTRIN) 600 MG tablet, Take 1 tablet (600 mg) by mouth every 6 hours as needed for moderate pain  Psyllium (METAMUCIL FIBER PO), Take by mouth daily     No current facility-administered medications on file prior to visit.        Allergies: Sesame oil; Cephalosporins; and Nickel    Immunization History   Administered Date(s) Administered     Influenza (IIV3) PF 10/22/2009     Influenza Vaccine IM > 6 months Valent IIV4 11/18/2014, 11/05/2015     TD (ADULT, 7+) 01/01/1992, 06/11/2003     TDAP Vaccine (Boostrix) 05/18/2011     Td (Adult), Adsorbed 07/05/2001, 06/11/2003         OBJECTIVE:/84 (BP Location: Right arm, Patient Position: Chair, Cuff Size: Adult Regular)   Pulse 64   Temp 97.6  F (36.4  C) (Oral)   Resp 20   Ht 1.626 m (5' 4\")   Wt 58.2 kg (128 lb 6.4 oz)   LMP 10/14/2013   BMI 22.04 kg/m     She appears well, vital signs are as noted by the nurse. Ears normal.  Throat and pharynx normal.  Neck supple. No adenopathy in the neck. Nose is congested. Sinuses non tender. The chest is clear, without wheezes or rales.    ASSESSMENT:   Viral upper respiratory illness, posible eqrly sinusisits    PLAN:  Symptomatic therapy suggested: push fluids and rest. Call or return to clinic prn if these symptoms worsen or fail to improve as anticipated.   "

## 2020-03-13 NOTE — NURSING NOTE
Questioned patient about current smoking habits.  Pt. has never smoked.  PULSE regular  My Chart: active  CLASSIFICATION OF OVERWEIGHT AND OBESITY BY BMI                        Obesity Class           BMI(kg/m2)  Underweight                                    < 18.5  Normal                                         18.5-24.9  Overweight                                     25.0-29.9  OBESITY                     I                  30.0-34.9                             II                 35.0-39.9  EXTREME OBESITY             III                >40                            Patient's  BMI Body mass index is 22.04 kg/m .  http://hin.nhlbi.nih.gov/menuplanner/menu.cgi  Pre-visit planning  Immunizations - up to date  Colonoscopy - is up to date  Mammogram - is up to date  Asthma -   PHQ9 -    CLAUDIA-7 -

## 2020-03-13 NOTE — LETTER
[facility]  [address], [address]  [place]  [address]    March 13, 2020        RE: Wen Sanchez  [address]  [facility] [address]      To Whom It May Concern,   The above named patient was seen at this clinic for acute illness today. Please excuse any absence through 3/15/20 .        CHERY Beltran M.D.          If you have any further questions or problems, please contact our office at [phone].

## 2020-03-15 ENCOUNTER — HEALTH MAINTENANCE LETTER (OUTPATIENT)
Age: 60
End: 2020-03-15

## 2020-03-16 ENCOUNTER — TELEPHONE (OUTPATIENT)
Dept: FAMILY MEDICINE | Facility: CLINIC | Age: 60
End: 2020-03-16

## 2020-03-16 DIAGNOSIS — J01.00 ACUTE NON-RECURRENT MAXILLARY SINUSITIS: Primary | ICD-10-CM

## 2020-03-16 NOTE — TELEPHONE ENCOUNTER
Jenny saw you on Friday for possible sinus inf and at that time she wanted to hold off on an abx.  She is still having the same pain in her teeth and ear so thinks it would be a good idea to call one into pharmacy if possible    Patient's phone #[phone]

## 2020-03-17 NOTE — TELEPHONE ENCOUNTER
Jenny called to say that she picked up her abx and it says if you are allergic to penicillin and cephalosporin you should not take this medication.  She is allergic to cephalosporin so wants to know if this is jose to take.    Patient's phone #[phone]

## 2020-03-17 NOTE — TELEPHONE ENCOUNTER
We have her cephalosporin allergy listed- as long as no PCN allergy new data does nto show association with cephalospirin and PCN-of course allergy possible with any med but no higher risk for her with this one

## 2020-05-20 ENCOUNTER — TRANSFERRED RECORDS (OUTPATIENT)
Dept: FAMILY MEDICINE | Facility: CLINIC | Age: 60
End: 2020-05-20

## 2020-06-01 ENCOUNTER — TRANSFERRED RECORDS (OUTPATIENT)
Dept: FAMILY MEDICINE | Facility: CLINIC | Age: 60
End: 2020-06-01

## 2020-06-03 ENCOUNTER — OFFICE VISIT (OUTPATIENT)
Dept: FAMILY MEDICINE | Facility: CLINIC | Age: 60
End: 2020-06-03

## 2020-06-03 VITALS
HEIGHT: 64 IN | SYSTOLIC BLOOD PRESSURE: 106 MMHG | DIASTOLIC BLOOD PRESSURE: 64 MMHG | HEART RATE: 60 BPM | TEMPERATURE: 97.5 F | WEIGHT: 124.2 LBS | BODY MASS INDEX: 21.21 KG/M2 | RESPIRATION RATE: 20 BRPM

## 2020-06-03 DIAGNOSIS — Z00.00 ROUTINE GENERAL MEDICAL EXAMINATION AT A HEALTH CARE FACILITY: Primary | ICD-10-CM

## 2020-06-03 DIAGNOSIS — Z13.220 SCREENING FOR LIPOID DISORDERS: ICD-10-CM

## 2020-06-03 DIAGNOSIS — R10.11 RUQ ABDOMINAL PAIN: ICD-10-CM

## 2020-06-03 DIAGNOSIS — K21.9 GASTROESOPHAGEAL REFLUX DISEASE WITHOUT ESOPHAGITIS: ICD-10-CM

## 2020-06-03 LAB
ALBUMIN SERPL-MCNC: 4.5 G/DL (ref 3.6–5.1)
ALBUMIN/GLOB SERPL: 1.8 {RATIO} (ref 1–2.5)
ALP SERPL-CCNC: 67 U/L (ref 33–130)
ALT 1742-6: 8 U/L (ref 0–32)
AST 1920-8: 10 U/L (ref 0–35)
BILIRUB SERPL-MCNC: 0.8 MG/DL (ref 0.2–1.2)
BUN SERPL-MCNC: 20 MG/DL (ref 7–25)
BUN/CREATININE RATIO: 26.7 (ref 6–22)
CALCIUM SERPL-MCNC: 9.5 MG/DL (ref 8.6–10.3)
CHLORIDE SERPLBLD-SCNC: 106 MMOL/L (ref 98–110)
CHOLEST SERPL-MCNC: 238 MG/DL (ref 0–199)
CHOLEST/HDLC SERPL: 3 {RATIO} (ref 0–5)
CO2 SERPL-SCNC: 27 MMOL/L (ref 20–32)
CREAT SERPL-MCNC: 0.75 MG/DL (ref 0.7–1.18)
GLOBULIN, CALCULATED - QUEST: 2.5 (ref 1.9–3.7)
GLUCOSE SERPL-MCNC: 83 MG/DL (ref 60–99)
HDLC SERPL-MCNC: 94 MG/DL (ref 40–150)
LDLC SERPL CALC-MCNC: 131 MG/DL (ref 0–130)
POTASSIUM SERPL-SCNC: 4.04 MMOL/L (ref 3.5–5.3)
PROT SERPL-MCNC: 7 G/DL (ref 6.1–8.1)
SODIUM SERPL-SCNC: 141.7 MMOL/L (ref 135–146)
TRIGL SERPL-MCNC: 66 MG/DL (ref 0–149)

## 2020-06-03 PROCEDURE — 99396 PREV VISIT EST AGE 40-64: CPT | Performed by: FAMILY MEDICINE

## 2020-06-03 PROCEDURE — 80053 COMPREHEN METABOLIC PANEL: CPT | Performed by: FAMILY MEDICINE

## 2020-06-03 PROCEDURE — 36415 COLL VENOUS BLD VENIPUNCTURE: CPT | Performed by: FAMILY MEDICINE

## 2020-06-03 PROCEDURE — 80061 LIPID PANEL: CPT | Performed by: FAMILY MEDICINE

## 2020-06-03 RX ORDER — SUCRALFATE 1 G/1
1 TABLET ORAL 2 TIMES DAILY
COMMUNITY
Start: 2020-06-03 | End: 2020-12-29

## 2020-06-03 RX ORDER — PANTOPRAZOLE SODIUM 40 MG/1
40 TABLET, DELAYED RELEASE ORAL 2 TIMES DAILY
COMMUNITY
Start: 2020-06-03 | End: 2020-12-29

## 2020-06-03 SDOH — HEALTH STABILITY: MENTAL HEALTH: HOW OFTEN DO YOU HAVE A DRINK CONTAINING ALCOHOL?: 2-3 TIMES A WEEK

## 2020-06-03 SDOH — HEALTH STABILITY: MENTAL HEALTH: HOW MANY STANDARD DRINKS CONTAINING ALCOHOL DO YOU HAVE ON A TYPICAL DAY?: 3 OR 4

## 2020-06-03 ASSESSMENT — MIFFLIN-ST. JEOR: SCORE: 1113.37

## 2020-06-03 NOTE — PROGRESS NOTES
SUBJECTIVE:   CC: Wen Sanchez is an 59 year old woman who presents for preventive health visit.     Healthy Habits:    Do you get at least three servings of calcium containing foods daily (dairy, green leafy vegetables, etc.)? yes    Amount of exercise or daily activities, outside of work: 4 day(s) per week    Problems taking medications regularly No    Medication side effects: No    Have you had an eye exam in the past two years? yes    Do you see a dentist twice per year? yes    Do you have sleep apnea, excessive snoring or daytime drowsiness?no      Gerd controlled    Planning PT for abd wall pain - saw GI and this recommended    Today's PHQ-2 Score:   PHQ-2 ( 1999 Pfizer) 5/9/2019 11/5/2018   Q1: Little interest or pleasure in doing things 0 0   Q2: Feeling down, depressed or hopeless 0 0   PHQ-2 Score 0 0       Abuse: Current or Past(Physical, Sexual or Emotional)- No  Do you feel safe in your environment? Yes        Social History     Tobacco Use     Smoking status: Never Smoker     Smokeless tobacco: Never Used   Substance Use Topics     Alcohol use: Yes     Alcohol/week: 3.3 standard drinks     Types: 4 Standard drinks or equivalent per week     Comment: weekends 3 drinks at most     If you drink alcohol do you typically have >3 drinks per day or >7 drinks per week? No                     Reviewed orders with patient.  Reviewed health maintenance and updated orders accordingly - Yes  BP Readings from Last 3 Encounters:   06/03/20 106/64   03/13/20 126/84   11/05/19 128/86    Wt Readings from Last 3 Encounters:   06/03/20 56.3 kg (124 lb 3.2 oz)   03/13/20 58.2 kg (128 lb 6.4 oz)   11/05/19 58.5 kg (129 lb)                  Patient Active Problem List   Diagnosis     Esophageal reflux     Hypoglycemia     Undiagnosed cardiac murmurs     Calculus of kidney     Health Care Home     Encounter for counseling     ACP (advance care planning)     Symptomatic menopausal or female climacteric states     Past  Surgical History:   Procedure Laterality Date     C ENLARGE BREAST  9/01     C MAMMOGRAM, SCREENING  2012     COLONOSCOPY  2010     COLONOSCOPY N/A 7/29/2019    Procedure: COLONOSCOPY;  Surgeon: Mike Hill MD;  Location:  GI     EYE EXAM ESTABLISHED PT  2013      UGI ENDOSCOPY DIAG W OR W/O BRUSH/WASH  2001    Dr. Machuca     LAPAROSCOPIC CHOLECYSTECTOMY N/A 9/18/2019    Procedure: CHOLECYSTECTOMY, LAPAROSCOPIC;  Surgeon: Elena Rico MD;  Location: [facility] OR     TEST NOT FOUND  10/2001    Normal GBUS except for liver hemangioma       Social History     Tobacco Use     Smoking status: Never Smoker     Smokeless tobacco: Never Used   Substance Use Topics     Alcohol use: Yes     Alcohol/week: 3.3 standard drinks     Types: 4 Standard drinks or equivalent per week     Comment: weekends 3 drinks at most     Family History   Problem Relation Age of Onset     Diabetes Mother      Diabetes Father      Cancer Sister         cervical     Heart Disease No family hx of      Lipids No family hx of      Breast Cancer No family hx of      Cancer - colorectal No family hx of      Colon Cancer No family hx of          Current Outpatient Medications   Medication Sig Dispense Refill     calcium carbonate (TUMS) 500 MG chewable tablet Take 1 chew tab by mouth 2 times daily       EPI E-Z PEN QUIRINO 1:1000  IJ 1 TIME ONLY 1 2     pantoprazole (PROTONIX) 40 MG EC tablet Take 1 tablet (40 mg) by mouth 2 times daily       sucralfate (CARAFATE) 1 GM tablet Take 1 tablet (1 g) by mouth 2 times daily       Allergies   Allergen Reactions     Sesame Oil Anaphylaxis     Cephalosporins Rash     Reaction was during a surgery, MD thinks the reaction was to the cephalosporin rather than an anesthetic agent  Reaction was severe rash and itching     Nickel Rash     Recent Labs   Lab Test 11/15/18  0840 10/21/18  0850 08/16/17  1902 02/25/17  1520 02/14/17  1012 11/05/15  0918   *  --   --   --  127 124   HDL 98  --   --   --  90 95    TRIG 56  --   --   --  62 57   ALT  --  29 18  --  17 20   CR  --  0.74 0.92 0.67 0.72 0.73   GFRESTIMATED  --  80 69 90 92 91   GFRESTBLACK  --  >90  --  >90  African American GFR Calc    --   --    POTASSIUM  --  3.4 3.9 3.8 4.0 4.1        Mammogram Screening: Patient over age 50, mutual decision to screen reflected in health maintenance.    Pertinent mammograms are reviewed under the imaging tab.  History of abnormal Pap smear: NO - age 30- 65 PAP every 3 years recommended  PAP / HPV 10/29/2007 2/24/2005 10/16/2003   PAP DATE - [facility] - [id number]   HPV DNA SEE NOTE - -     Reviewed and updated as needed this visit by clinical staff  Tobacco         Reviewed and updated as needed this visit by Provider        Past Medical History:   Diagnosis Date     Family history of diabetes mellitus     Mother and Father     Gastroesophageal reflux disease      Kidney stones       Past Surgical History:   Procedure Laterality Date     C ENLARGE BREAST  9/01     C MAMMOGRAM, SCREENING  2012     COLONOSCOPY  2010     COLONOSCOPY N/A 7/29/2019    Procedure: COLONOSCOPY;  Surgeon: Mike Hill MD;  Location:  GI     EYE EXAM ESTABLISHED PT  2013      UGI ENDOSCOPY DIAG W OR W/O BRUSH/WASH  2001    Dr. Machuca     LAPAROSCOPIC CHOLECYSTECTOMY N/A 9/18/2019    Procedure: CHOLECYSTECTOMY, LAPAROSCOPIC;  Surgeon: Elena Rico MD;  Location:  OR     TEST NOT FOUND  10/2001    Normal GBUS except for liver hemangioma       ROS:  CONSTITUTIONAL: NEGATIVE for fever, chills, change in weight  INTEGUMENTARY/SKIN: NEGATIVE for worrisome rashes, moles or lesions  EYES: NEGATIVE for vision changes or irritation  ENT: NEGATIVE for ear, mouth and throat problems  RESP: NEGATIVE for significant cough or SOB  BREAST: NEGATIVE for masses, tenderness or discharge  CV: NEGATIVE for chest pain, palpitations or peripheral edema  GI: NEGATIVE for nausea, abdominal pain, heartburn, or change in bowel habits  : NEGATIVE for  "unusual urinary or vaginal symptoms. No vaginal bleeding.  MUSCULOSKELETAL:still abd side ache asnoted above  NEURO: NEGATIVE for weakness, dizziness or paresthesias  PSYCHIATRIC: NEGATIVE for changes in mood or affect     OBJECTIVE:   /64 (BP Location: Left arm, Patient Position: Chair, Cuff Size: Adult Regular)   Pulse 60   Temp 97.5  F (36.4  C)   Resp 20   Ht 1.626 m (5' 4\")   Wt 56.3 kg (124 lb 3.2 oz)   LMP 10/14/2013   BMI 21.32 kg/m    EXAM:  GENERAL: healthy, alert and no distress  EYES: Eyes grossly normal to inspection, PERRL and conjunctivae and sclerae normal  HENT: ear canals and TM's normal, nose and mouth without ulcers or lesions  NECK: no adenopathy, no asymmetry, masses, or scars and thyroid normal to palpation  RESP: lungs clear to auscultation - no rales, rhonchi or wheezes  CV: regular rate and rhythm, normal S1 S2, no S3 or S4, no murmur, click or rub, no peripheral edema and peripheral pulses strong  ABDOMEN: soft, nontender, no hepatosplenomegaly, no masses and bowel sounds normal  MS: no gross musculoskeletal defects noted, no edema  SKIN: no suspicious lesions or rashes  NEURO: Normal strength and tone, mentation intact and speech normal  PSYCH: mentation appears normal, affect normal/bright  LYMPH: no cervical, supraclavicular, axillary, or inguinal adenopathy    Diagnostic Test Results:  Labs reviewed in Epic    ASSESSMENT/PLAN:   (Z00.00) Routine general medical examination at a health care facility  (primary encounter diagnosis)  Comment: discussed preventitive healthcare   Plan: Continue to work on healthy diet and exercise, discussed healthy habits     (K21.9) Gastroesophageal reflux disease without esophagitis  Comment: stable symptomatically   Plan: continue current medications at current doses     (R10.11) RUQ abdominal pain  Comment: mild pain persists-planning PT after GI recommended for muscle wall pain  Plan: pt    COUNSELING:   Reviewed preventive health " "counseling, as reflected in patient instructions       Regular exercise       Healthy diet/nutrition       Vision screening       Immunizations    Recommend vacciner: Zoster             Colon cancer screening    Estimated body mass index is 21.32 kg/m  as calculated from the following:    Height as of this encounter: 1.626 m (5' 4\").    Weight as of this encounter: 56.3 kg (124 lb 3.2 oz).         reports that she has never smoked. She has never used smokeless tobacco.      Counseling Resources:  ATP IV Guidelines  Pooled Cohorts Equation Calculator  Breast Cancer Risk Calculator  FRAX Risk Assessment  ICSI Preventive Guidelines  Dietary Guidelines for Americans, 2010  USDA's MyPlate  ASA Prophylaxis  Lung CA Screening    Mike Beltran MD  [facility]  "

## 2020-06-03 NOTE — NURSING NOTE
Wen Sanchez is here for a CPX.    Pre-visit planning  Immunizations -up to date  Colonoscopy -is up to date  Mammogram -is up to date  Asthma test --  PHQ9 -  CLAUDIA 7 -    Questioned patient about current smoking habits.  Pt. has never smoked.  Body mass index is 21.32 kg/m .  PULSE regular  My Chart: activ  CLASSIFICATION OF OVERWEIGHT AND OBESITY BY BMI                        Obesity Class           BMI(kg/m2)  Underweight                                    < 18.5  Normal                                         18.5-24.9  Overweight                                     25.0-29.9  OBESITY                     I                  30.0-34.9                             II                 35.0-39.9  EXTREME OBESITY             III                >40                            Patient's  BMI Body mass index is 21.32 kg/m .  Http://hin.nhlbi.nih.gov/menuplanner/menu.cgi  ETOH screening:

## 2020-09-01 NOTE — ED AVS SNAPSHOT
[facility]    [address]    [facility] [address]    Phone:  [phone]    Fax:  [phone]                                       Wen Sanchez   MRN: [id number]    Department:  [facility] Emergency Department   Date of Visit:  2/25/2017           After Visit Summary Signature Page     I have received my discharge instructions, and my questions have been answered. I have discussed any challenges I see with this plan with the nurse or doctor.    ..........................................................................................................................................  Patient/Patient Representative Signature      ..........................................................................................................................................  Patient Representative Print Name and Relationship to Patient    ..................................................               ................................................  Date                                            Time    ..........................................................................................................................................  Reviewed by Signature/Title    ...................................................              ..............................................  Date                                                            Time           How Severe Are Your Spot(S)?: mild Have Your Spot(S) Been Treated In The Past?: has not been treated Hpi Title: Evaluation of Skin Lesions

## 2020-09-28 ENCOUNTER — OFFICE VISIT (OUTPATIENT)
Dept: FAMILY MEDICINE | Facility: CLINIC | Age: 60
End: 2020-09-28

## 2020-09-28 VITALS
HEART RATE: 56 BPM | SYSTOLIC BLOOD PRESSURE: 110 MMHG | BODY MASS INDEX: 22.81 KG/M2 | DIASTOLIC BLOOD PRESSURE: 80 MMHG | HEIGHT: 64 IN | OXYGEN SATURATION: 99 % | TEMPERATURE: 98.1 F | WEIGHT: 133.6 LBS

## 2020-09-28 DIAGNOSIS — R00.2 PALPITATIONS: Primary | ICD-10-CM

## 2020-09-28 PROCEDURE — 93000 ELECTROCARDIOGRAM COMPLETE: CPT | Performed by: FAMILY MEDICINE

## 2020-09-28 PROCEDURE — 99214 OFFICE O/P EST MOD 30 MIN: CPT | Mod: 25 | Performed by: FAMILY MEDICINE

## 2020-09-28 PROCEDURE — 36415 COLL VENOUS BLD VENIPUNCTURE: CPT | Performed by: FAMILY MEDICINE

## 2020-09-28 PROCEDURE — 84443 ASSAY THYROID STIM HORMONE: CPT | Performed by: FAMILY MEDICINE

## 2020-09-28 ASSESSMENT — MIFFLIN-ST. JEOR: SCORE: 1151.01

## 2020-09-28 NOTE — PROGRESS NOTES
"SUBJECTIVE:  Wen Sanchez, a 60 year old female scheduled an appointment to discuss the following issues:  Palpitations  Pt c/o episodic heart \"flutter\" lasting seconds at a time over last 2 weeks, not every day. No dizziness, chest pain, shortness of breath .    Pt notes these moreso in am but can occur in evening-seems to be when resting.    Pt drinks coffee in am but no more pop.    Pt does relate some anxiety related covid and the world as well as job but does not feel it is problematic    Medical, social, surgical, and family histories reviewed.    Patient Active Problem List   Diagnosis     Esophageal reflux     Hypoglycemia     Undiagnosed cardiac murmurs     Calculus of kidney     Health Care Home     Encounter for counseling     ACP (advance care planning)     Symptomatic menopausal or female climacteric states     Past Medical History:   Diagnosis Date     Family history of diabetes mellitus     Mother and Father     Gastroesophageal reflux disease      Kidney stones      Family History   Problem Relation Age of Onset     Diabetes Mother      Diabetes Father      Cancer Sister         cervical     Heart Disease No family hx of      Lipids No family hx of      Breast Cancer No family hx of      Cancer - colorectal No family hx of      Colon Cancer No family hx of      Social History     Socioeconomic History     Marital status:      Spouse name: Not on file     Number of children: 3     Years of education: 12     Highest education level: Not on file   Occupational History     Occupation:      Employer: [employer]   Social Needs     Financial resource strain: Not on file     Food insecurity     Worry: Not on file     Inability: Not on file     Transportation needs     Medical: Not on file     Non-medical: Not on file   Tobacco Use     Smoking status: Never Smoker     Smokeless tobacco: Never Used   Substance and Sexual Activity     Alcohol use: Yes     Alcohol/week: 3.3 standard drinks "     Types: 4 Standard drinks or equivalent per week     Frequency: 2-3 times a week     Drinks per session: 3 or 4     Drug use: No     Sexual activity: Yes     Partners: Male     Birth control/protection: Pill   Lifestyle     Physical activity     Days per week: Not on file     Minutes per session: Not on file     Stress: Not on file   Relationships     Social connections     Talks on phone: Not on file     Gets together: Not on file     Attends Buddhist service: Not on file     Active member of club or organization: Not on file     Attends meetings of clubs or organizations: Not on file     Relationship status: Not on file     Intimate partner violence     Fear of current or ex partner: Not on file     Emotionally abused: Not on file     Physically abused: Not on file     Forced sexual activity: Not on file   Other Topics Concern      Service Not Asked     Blood Transfusions Not Asked     Caffeine Concern Not Asked     Occupational Exposure Not Asked     Hobby Hazards Not Asked     Sleep Concern Not Asked     Stress Concern Not Asked     Weight Concern Not Asked     Special Diet Not Asked     Back Care Not Asked     Exercise Yes     Bike Helmet No     Seat Belt Yes     Self-Exams Yes     Parent/sibling w/ CABG, MI or angioplasty before 65F 55M? Not Asked   Social History Narrative        Functional abiltity:      Hearing imparment:No      Acitvities of daily living:Normal      Risk of falls:No      Home safety of concern:No        Do you exercise?     Yes   Times/week: 2    History of abusive relationships in past:   No    History of abusive relationships currently:    No    Do you feel emotionally and physically safe in your environment?     Yes    Do you own a gun?  No      Is the gun kept in a safe place:   NOT APPLICABLE    Do you wear a seatbelt regularly?     Yes    Do you use sun screen?     Yes         Past Surgical History:   Procedure Laterality Date     C ENLARGE BREAST  9/01     C MAMMOGRAM,  SCREENING  2012     COLONOSCOPY  2010     COLONOSCOPY N/A 7/29/2019    Procedure: COLONOSCOPY;  Surgeon: Mike Hill MD;  Location:  GI     EYE EXAM ESTABLISHED PT  2013      UGI ENDOSCOPY DIAG W OR W/O BRUSH/WASH  2001    Dr. Machuca     LAPAROSCOPIC CHOLECYSTECTOMY N/A 9/18/2019    Procedure: CHOLECYSTECTOMY, LAPAROSCOPIC;  Surgeon: Elena Rico MD;  Location: [facility] OR     TEST NOT FOUND  10/2001    Normal GBUS except for liver hemangioma     calcium carbonate (TUMS) 500 MG chewable tablet, Take 1 chew tab by mouth 2 times daily  EPI E-Z PEN QUIRINO 1:1000  IJ, 1 TIME ONLY  pantoprazole (PROTONIX) 40 MG EC tablet, Take 1 tablet (40 mg) by mouth 2 times daily  sucralfate (CARAFATE) 1 GM tablet, Take 1 tablet (1 g) by mouth 2 times daily    No current facility-administered medications on file prior to visit.        Allergies: Sesame oil; Cephalosporins; and Nickel    Immunization History   Administered Date(s) Administered     Influenza (IIV3) PF 10/22/2009     Influenza Vaccine IM > 6 months Valent IIV4 11/18/2014, 11/05/2015     TD (ADULT, 7+) 01/01/1992, 06/11/2003     TDAP Vaccine (Boostrix) 05/18/2011     Td (Adult), Adsorbed 07/05/2001, 06/11/2003        ROS:  CONSTITUTIONAL: NEGATIVE for fever, chills  INTEGUMENTARY/SKIN: NEGATIVE for worrisome rashes, moles or lesions  EYES: NEGATIVE for vision changes   RESP: NEGATIVE for significant cough or SOB  GI: NEGATIVE for nausea, abdominal pain, heartburn, or change in bowel habits  : NEGATIVE for frequency, dysuria, or hematuria  MUSCULOSKELETAL: NEGATIVE for significant arthralgias or myalgia  NEURO: NEGATIVE for weakness, dizziness or paresthesias or headache  ENDOCRINE: NEGATIVE for temperature intolerance, skin/hair changes  HEME: NEGATIVE for bleeding problems  PSYCHIATRIC: NEGATIVE for changes in mood or affect    OBJECTIVE:  /80 (BP Location: Left arm, Patient Position: Sitting, Cuff Size: Adult Regular)   Pulse 56   Temp 98.1  F (36.7  " C) (Oral)   Ht 1.626 m (5' 4\")   Wt 60.6 kg (133 lb 9.6 oz)   LMP 10/14/2013   SpO2 99%   BMI 22.93 kg/m    EXAM:  GENERAL APPEARANCE: healthy, alert and no distress  EYES: EOMI,  PERRL  HENT: ear canals and TM's normal and nose and mouth without ulcers or lesions  NECK: no adenopathy, no asymmetry, masses, or scars and thyroid normal to palpation  RESP: lungs clear to auscultation - no rales, rhonchi or wheezes  CV: regular rates and rhythm, normal S1 S2, no S3 or S4 and no murmur, click or rub -  ABDOMEN:  soft, nontender, no HSM or masses and bowel sounds normal  MS: extremities normal- no gross deformities noted, no evidence of inflammation in joints, FROM in all extremities.  SKIN: no suspicious lesions or rashes  NEURO: Normal strength and tone, sensory exam grossly normal, mentation intact and speech normal  PSYCH: mentation appears normal and affect normal/bright    EKG read by computer as anteroseptal infarct but only true Q wavwe in V2, no change from previous EKG on serial comparison    ASSESSMENT/PLAN:  (R00.2) Palpitations  (primary encounter diagnosis)  Comment: likely normal PAC's, no worrisome findings, will check tsh and if normal reassurance, if symptoms persist needs zio patch and can call for that  Plan: EKG 12-lead complete w/read - Clinics, TSH with        free T4 reflex ([facility])             "

## 2020-09-28 NOTE — NURSING NOTE
Elizabet is here for for increased anxiety the last 2-3 weeks    Pre-Visit Screening:  Immunizations:UTD  Colonoscopy:UTD  Mammogram:needs  Asthma Action Test/Plan:NA  PHQ9:done today  GAD7:done today  Questioned patient about current smoking habits Pt.never smoekd  OK to leave a detailed message on voice mail for today's visit yes, phone # [phone]

## 2020-09-29 ENCOUNTER — MYC MEDICAL ADVICE (OUTPATIENT)
Dept: FAMILY MEDICINE | Facility: CLINIC | Age: 60
End: 2020-09-29

## 2020-09-29 DIAGNOSIS — F41.0 PANIC DISORDER WITHOUT AGORAPHOBIA: ICD-10-CM

## 2020-09-29 LAB — TSH SERPL-ACNC: 1.83 MIU/L (ref 0.4–4.5)

## 2020-09-29 NOTE — TELEPHONE ENCOUNTER
Pt is requesting a refill of Xanax that she had got from Dr Valladares.    Please advise  Thanks,Kelle

## 2020-09-30 RX ORDER — ALPRAZOLAM 1 MG
1 TABLET ORAL 3 TIMES DAILY PRN
Qty: 10 TABLET | Refills: 0 | Status: SHIPPED | OUTPATIENT
Start: 2020-09-30 | End: 2022-06-01

## 2020-10-07 ENCOUNTER — MYC MEDICAL ADVICE (OUTPATIENT)
Dept: FAMILY MEDICINE | Facility: CLINIC | Age: 60
End: 2020-10-07

## 2020-10-07 DIAGNOSIS — M54.2 NECK PAIN: ICD-10-CM

## 2020-10-07 DIAGNOSIS — M25.512 LEFT SHOULDER PAIN, UNSPECIFIED CHRONICITY: Primary | ICD-10-CM

## 2020-10-07 NOTE — TELEPHONE ENCOUNTER
Routing to Dr. Beltran for review, are you willing to give physical therapy order?   leukocytosis noted, admitted with uri sob  RVP + coronavirus c/w viral pneumonia possible bacterial component as well  -Continue Rocephin for 7 days (until 1/8),    id follow up.  change to po cefuroxime 500 BID pending clinical course  - continuous pulse ox, keep 02 sat >90  check room air o2 sat

## 2020-10-07 NOTE — TELEPHONE ENCOUNTER
Patient was informed and referral was faxed to [place] at [facility] physical therapy.

## 2020-12-29 ENCOUNTER — OFFICE VISIT (OUTPATIENT)
Dept: FAMILY MEDICINE | Facility: CLINIC | Age: 60
End: 2020-12-29

## 2020-12-29 VITALS
HEART RATE: 59 BPM | BODY MASS INDEX: 22.53 KG/M2 | SYSTOLIC BLOOD PRESSURE: 104 MMHG | OXYGEN SATURATION: 98 % | TEMPERATURE: 97.9 F | DIASTOLIC BLOOD PRESSURE: 68 MMHG | WEIGHT: 132 LBS | HEIGHT: 64 IN

## 2020-12-29 DIAGNOSIS — R07.89 LEFT-SIDED CHEST WALL PAIN: Primary | ICD-10-CM

## 2020-12-29 PROCEDURE — 99214 OFFICE O/P EST MOD 30 MIN: CPT | Performed by: FAMILY MEDICINE

## 2020-12-29 ASSESSMENT — MIFFLIN-ST. JEOR: SCORE: 1143.75

## 2020-12-29 NOTE — PROGRESS NOTES
"Subjective     Wen Sanchez is a 60 year old female who presents to clinic today for the following health issues:    HPI         Chest Pain  Onset/Duration: 2 weeks, 4-5 flares lasting 3 minutes or less, \"spasm\"  Description:   Location: left side  Character: achey  Radiation: none  Duration: <3 minutes   Intensity: moderate  Progression of Symptoms: intermittent, last episode last week  Accompanying Signs & Symptoms:  Shortness of breath: no  Sweating: no  Nausea/vomiting: no  Lightheadedness: no  Palpitations: no  Fever/Chills: no  Cough: no           Heartburn: no  History:   Family history of heart disease: no  Tobacco use: no  Previous similar symptoms: no   Precipitating factors:   Worse with exertion: unclear as symptoms last short period  Worse with deep breaths: not sure           Related to eating: no           Better with burping: no  Alleviating factors: pressing on left upper back by   Therapies tried and outcome: no as goes away so quick    Pt does have some chronic upper back and neck issues- sees PT at [facility]    Patient Active Problem List   Diagnosis     Esophageal reflux     Hypoglycemia     Undiagnosed cardiac murmurs     Calculus of kidney     Health Care Home     Encounter for counseling     ACP (advance care planning)     Symptomatic menopausal or female climacteric states     Past Medical History:   Diagnosis Date     Family history of diabetes mellitus     Mother and Father     Gastroesophageal reflux disease      Kidney stones      Family History   Problem Relation Age of Onset     Diabetes Mother      Diabetes Father      Cancer Sister         cervical     Heart Disease No family hx of      Lipids No family hx of      Breast Cancer No family hx of      Cancer - colorectal No family hx of      Colon Cancer No family hx of      Social History     Socioeconomic History     Marital status:      Spouse name: Not on file     Number of children: 3     Years of education: 12 "     Highest education level: Not on file   Occupational History     Occupation:      Employer: [employer]   Social Needs     Financial resource strain: Not on file     Food insecurity     Worry: Not on file     Inability: Not on file     Transportation needs     Medical: Not on file     Non-medical: Not on file   Tobacco Use     Smoking status: Never Smoker     Smokeless tobacco: Never Used   Substance and Sexual Activity     Alcohol use: Yes     Alcohol/week: 3.3 standard drinks     Types: 4 Standard drinks or equivalent per week     Frequency: 2-3 times a week     Drinks per session: 3 or 4     Drug use: No     Sexual activity: Yes     Partners: Male     Birth control/protection: Pill   Lifestyle     Physical activity     Days per week: Not on file     Minutes per session: Not on file     Stress: Not on file   Relationships     Social connections     Talks on phone: Not on file     Gets together: Not on file     Attends Worship service: Not on file     Active member of club or organization: Not on file     Attends meetings of clubs or organizations: Not on file     Relationship status: Not on file     Intimate partner violence     Fear of current or ex partner: Not on file     Emotionally abused: Not on file     Physically abused: Not on file     Forced sexual activity: Not on file   Other Topics Concern      Service Not Asked     Blood Transfusions Not Asked     Caffeine Concern Not Asked     Occupational Exposure Not Asked     Hobby Hazards Not Asked     Sleep Concern Not Asked     Stress Concern Not Asked     Weight Concern Not Asked     Special Diet Not Asked     Back Care Not Asked     Exercise Yes     Bike Helmet No     Seat Belt Yes     Self-Exams Yes     Parent/sibling w/ CABG, MI or angioplasty before 65F 55M? Not Asked   Social History Narrative        Functional abiltity:      Hearing imparment:No      Acitvities of daily living:Normal      Risk of falls:No      Home safety of  "concern:No        Do you exercise?     Yes   Times/week: 2    History of abusive relationships in past:   No    History of abusive relationships currently:    No    Do you feel emotionally and physically safe in your environment?     Yes    Do you own a gun?  No      Is the gun kept in a safe place:   NOT APPLICABLE    Do you wear a seatbelt regularly?     Yes    Do you use sun screen?     Yes         Past Surgical History:   Procedure Laterality Date     C ENLARGE BREAST  9/01     C MAMMOGRAM, SCREENING  2012     COLONOSCOPY  2010     COLONOSCOPY N/A 7/29/2019    Procedure: COLONOSCOPY;  Surgeon: Mike Hill MD;  Location:  GI     EYE EXAM ESTABLISHED PT  2013      UGI ENDOSCOPY DIAG W OR W/O BRUSH/WASH  2001    Dr. Machuca     LAPAROSCOPIC CHOLECYSTECTOMY N/A 9/18/2019    Procedure: CHOLECYSTECTOMY, LAPAROSCOPIC;  Surgeon: Elena Rico MD;  Location:  OR     TEST NOT FOUND  10/2001    Normal GBUS except for liver hemangioma          ALPRAZolam (XANAX) 1 MG tablet, Take 1 tablet (1 mg) by mouth 3 times daily as needed for anxiety       EPI E-Z PEN QUIRINO 1:1000  IJ, 1 TIME ONLY    No current facility-administered medications on file prior to visit.        Allergies: Sesame oil, Cephalosporins, and Nickel    Immunization History   Administered Date(s) Administered     Influenza (IIV3) PF 10/22/2009     Influenza Vaccine IM > 6 months Valent IIV4 11/18/2014, 11/05/2015     TD (ADULT, 7+) 01/01/1992, 06/11/2003     TDAP Vaccine (Boostrix) 05/18/2011     Td (Adult), Adsorbed 07/05/2001, 06/11/2003        Review of Systems   Constitutional, HEENT, cardiovascular, pulmonary, gi and gu systems are negative, except as otherwise noted.      Objective    /68 (BP Location: Right arm, Patient Position: Sitting, Cuff Size: Adult Regular)   Pulse 59   Temp 97.9  F (36.6  C) (Oral)   Ht 1.626 m (5' 4\")   Wt 59.9 kg (132 lb)   LMP 10/14/2013   SpO2 98%   BMI 22.66 kg/m    Body mass index is 22.66 " kg/m .  Physical Exam   GENERAL: healthy, alert and no distress  EYES: Eyes grossly normal to inspection, PERRL and conjunctivae and sclerae normal  HENT: ear canals and TM's normal, nose and mouth without ulcers or lesions  NECK: no adenopathy, no asymmetry, masses, or scars and thyroid normal to palpation  RESP: lungs clear to auscultation - no rales, rhonchi or wheezes  CV: regular rate and rhythm, normal S1 S2, no S3 or S4, no murmur, click or rub, no peripheral edema and peripheral pulses strong  ABDOMEN: soft, nontender, no hepatosplenomegaly, no masses and bowel sounds normal  MS: no gross musculoskeletal defects noted, no edema  MS: no tenderness over chest wall  SKIN: no suspicious lesions or rashes  PSYCH: mentation appears normal, affect normal/bright            Assessment & Plan     Left-sided chest wall pain  Normal exam, symptoms seem to be resolved as no issues since last week, no exertional symptoms , no shortness of breath or cough, suspect muscular issue    Recommend rest, nsaids    Pt does have mammo in a few weeks scheduled as well    If shortness of breath , worsening symptoms contact provider, patient given instructions to go to emergency department immediately if worsening of symptoms and verbalizes this understanding               No follow-ups on file.    Mike Beltran MD  [facility]

## 2020-12-29 NOTE — NURSING NOTE
Elizabet is here today for wheat she describes to be muscle spasms near her left breast.    Pre-visit Screening:  Immunizations:  up to date  Colonoscopy:  is up to date  Mammogram: is up to date  Asthma Action Test/Plan:  NA  PHQ9:  NA  GAD7:  NA  Questioned patient about current smoking habits Pt. has never smoked.  Ok to leave detailed message on voice mail for today's visit only Yes, phone # [phone]

## 2021-01-09 ENCOUNTER — HEALTH MAINTENANCE LETTER (OUTPATIENT)
Age: 61
End: 2021-01-09

## 2021-01-10 ENCOUNTER — APPOINTMENT (OUTPATIENT)
Dept: GENERAL RADIOLOGY | Facility: CLINIC | Age: 61
End: 2021-01-10
Attending: EMERGENCY MEDICINE
Payer: COMMERCIAL

## 2021-01-10 ENCOUNTER — HOSPITAL ENCOUNTER (EMERGENCY)
Facility: CLINIC | Age: 61
Discharge: HOME | End: 2021-01-10
Attending: EMERGENCY MEDICINE | Admitting: EMERGENCY MEDICINE
Payer: COMMERCIAL

## 2021-01-10 VITALS
DIASTOLIC BLOOD PRESSURE: 98 MMHG | TEMPERATURE: 97.3 F | OXYGEN SATURATION: 95 % | SYSTOLIC BLOOD PRESSURE: 127 MMHG | RESPIRATION RATE: 19 BRPM | HEART RATE: 64 BPM

## 2021-01-10 DIAGNOSIS — R06.00 DYSPNEA, UNSPECIFIED TYPE: ICD-10-CM

## 2021-01-10 LAB
ANION GAP SERPL CALCULATED.3IONS-SCNC: 4 MMOL/L (ref 3–14)
BASOPHILS # BLD AUTO: 0 10E9/L (ref 0–0.2)
BASOPHILS NFR BLD AUTO: 0.2 %
BUN SERPL-MCNC: 21 MG/DL (ref 7–30)
CALCIUM SERPL-MCNC: 9.3 MG/DL (ref 8.5–10.1)
CHLORIDE SERPL-SCNC: 109 MMOL/L (ref 94–109)
CO2 SERPL-SCNC: 27 MMOL/L (ref 20–32)
CREAT SERPL-MCNC: 0.61 MG/DL (ref 0.52–1.04)
D DIMER PPP FEU-MCNC: <0.3 UG/ML FEU (ref 0–0.5)
DIFFERENTIAL METHOD BLD: NORMAL
EOSINOPHIL # BLD AUTO: 0.1 10E9/L (ref 0–0.7)
EOSINOPHIL NFR BLD AUTO: 2 %
ERYTHROCYTE [DISTWIDTH] IN BLOOD BY AUTOMATED COUNT: 12.4 % (ref 10–15)
FLUAV RNA RESP QL NAA+PROBE: NEGATIVE
FLUBV RNA RESP QL NAA+PROBE: NEGATIVE
GFR SERPL CREATININE-BSD FRML MDRD: >90 ML/MIN/{1.73_M2}
GLUCOSE SERPL-MCNC: 87 MG/DL (ref 70–99)
HCT VFR BLD AUTO: 44.2 % (ref 35–47)
HGB BLD-MCNC: 14.7 G/DL (ref 11.7–15.7)
IMM GRANULOCYTES # BLD: 0 10E9/L (ref 0–0.4)
IMM GRANULOCYTES NFR BLD: 0.2 %
LABORATORY COMMENT REPORT: NORMAL
LYMPHOCYTES # BLD AUTO: 2.6 10E9/L (ref 0.8–5.3)
LYMPHOCYTES NFR BLD AUTO: 42.6 %
MCH RBC QN AUTO: 30.6 PG (ref 26.5–33)
MCHC RBC AUTO-ENTMCNC: 33.3 G/DL (ref 31.5–36.5)
MCV RBC AUTO: 92 FL (ref 78–100)
MONOCYTES # BLD AUTO: 0.4 10E9/L (ref 0–1.3)
MONOCYTES NFR BLD AUTO: 5.7 %
NEUTROPHILS # BLD AUTO: 3 10E9/L (ref 1.6–8.3)
NEUTROPHILS NFR BLD AUTO: 49.3 %
NRBC # BLD AUTO: 0 10*3/UL
NRBC BLD AUTO-RTO: 0 /100
PLATELET # BLD AUTO: 301 10E9/L (ref 150–450)
POTASSIUM SERPL-SCNC: 3.6 MMOL/L (ref 3.4–5.3)
RBC # BLD AUTO: 4.81 10E12/L (ref 3.8–5.2)
RSV RNA SPEC QL NAA+PROBE: NORMAL
SARS-COV-2 RNA RESP QL NAA+PROBE: NEGATIVE
SODIUM SERPL-SCNC: 140 MMOL/L (ref 133–144)
SPECIMEN SOURCE: NORMAL
TROPONIN I SERPL-MCNC: <0.015 UG/L (ref 0–0.04)
TROPONIN I SERPL-MCNC: <0.015 UG/L (ref 0–0.04)
WBC # BLD AUTO: 6.1 10E9/L (ref 4–11)

## 2021-01-10 PROCEDURE — 96372 THER/PROPH/DIAG INJ SC/IM: CPT | Mod: 59 | Performed by: EMERGENCY MEDICINE

## 2021-01-10 PROCEDURE — 84484 ASSAY OF TROPONIN QUANT: CPT | Performed by: EMERGENCY MEDICINE

## 2021-01-10 PROCEDURE — 250N000009 HC RX 250: Performed by: EMERGENCY MEDICINE

## 2021-01-10 PROCEDURE — 93005 ELECTROCARDIOGRAM TRACING: CPT

## 2021-01-10 PROCEDURE — 99285 EMERGENCY DEPT VISIT HI MDM: CPT | Mod: 25

## 2021-01-10 PROCEDURE — 250N000013 HC RX MED GY IP 250 OP 250 PS 637: Performed by: EMERGENCY MEDICINE

## 2021-01-10 PROCEDURE — 36415 COLL VENOUS BLD VENIPUNCTURE: CPT | Performed by: EMERGENCY MEDICINE

## 2021-01-10 PROCEDURE — 71046 X-RAY EXAM CHEST 2 VIEWS: CPT

## 2021-01-10 PROCEDURE — 250N000011 HC RX IP 250 OP 636: Performed by: EMERGENCY MEDICINE

## 2021-01-10 PROCEDURE — 80048 BASIC METABOLIC PNL TOTAL CA: CPT | Performed by: EMERGENCY MEDICINE

## 2021-01-10 PROCEDURE — C9803 HOPD COVID-19 SPEC COLLECT: HCPCS

## 2021-01-10 PROCEDURE — 87636 SARSCOV2 & INF A&B AMP PRB: CPT | Performed by: EMERGENCY MEDICINE

## 2021-01-10 PROCEDURE — 85379 FIBRIN DEGRADATION QUANT: CPT | Performed by: EMERGENCY MEDICINE

## 2021-01-10 PROCEDURE — 96374 THER/PROPH/DIAG INJ IV PUSH: CPT

## 2021-01-10 PROCEDURE — 96375 TX/PRO/DX INJ NEW DRUG ADDON: CPT

## 2021-01-10 PROCEDURE — 85025 COMPLETE CBC W/AUTO DIFF WBC: CPT | Performed by: EMERGENCY MEDICINE

## 2021-01-10 RX ORDER — PREDNISONE 20 MG/1
TABLET ORAL
Qty: 10 TABLET | Refills: 0 | Status: SHIPPED | OUTPATIENT
Start: 2021-01-11 | End: 2021-05-10

## 2021-01-10 RX ORDER — ASPIRIN 81 MG/1
324 TABLET, CHEWABLE ORAL ONCE
Status: COMPLETED | OUTPATIENT
Start: 2021-01-10 | End: 2021-01-10

## 2021-01-10 RX ORDER — DIPHENHYDRAMINE HCL 25 MG
25 TABLET ORAL EVERY 6 HOURS PRN
Qty: 20 TABLET | Refills: 0 | Status: SHIPPED | OUTPATIENT
Start: 2021-01-10 | End: 2021-05-10

## 2021-01-10 RX ORDER — DIPHENHYDRAMINE HYDROCHLORIDE 50 MG/ML
50 INJECTION INTRAMUSCULAR; INTRAVENOUS ONCE
Status: COMPLETED | OUTPATIENT
Start: 2021-01-10 | End: 2021-01-10

## 2021-01-10 RX ORDER — METHYLPREDNISOLONE SODIUM SUCCINATE 125 MG/2ML
125 INJECTION, POWDER, LYOPHILIZED, FOR SOLUTION INTRAMUSCULAR; INTRAVENOUS ONCE
Status: COMPLETED | OUTPATIENT
Start: 2021-01-10 | End: 2021-01-10

## 2021-01-10 RX ORDER — EPINEPHRINE 1 MG/ML
0.3 INJECTION, SOLUTION, CONCENTRATE INTRAVENOUS ONCE
Status: COMPLETED | OUTPATIENT
Start: 2021-01-10 | End: 2021-01-10

## 2021-01-10 RX ADMIN — EPINEPHRINE 0.3 MG: 1 INJECTION, SOLUTION, CONCENTRATE INTRAVENOUS at 10:36

## 2021-01-10 RX ADMIN — METHYLPREDNISOLONE SODIUM SUCCINATE 125 MG: 125 INJECTION, POWDER, FOR SOLUTION INTRAMUSCULAR; INTRAVENOUS at 10:27

## 2021-01-10 RX ADMIN — DIPHENHYDRAMINE HYDROCHLORIDE 50 MG: 50 INJECTION INTRAMUSCULAR; INTRAVENOUS at 10:27

## 2021-01-10 RX ADMIN — ASPIRIN 81 MG CHEWABLE TABLET 324 MG: 81 TABLET CHEWABLE at 10:27

## 2021-01-10 RX ADMIN — FAMOTIDINE 20 MG: 10 INJECTION, SOLUTION INTRAVENOUS at 10:27

## 2021-01-10 ASSESSMENT — ENCOUNTER SYMPTOMS
SHORTNESS OF BREATH: 1
FEVER: 0
COUGH: 0
NAUSEA: 0
VOMITING: 0
VOICE CHANGE: 0
PALPITATIONS: 0
ABDOMINAL PAIN: 0
TROUBLE SWALLOWING: 1
NERVOUS/ANXIOUS: 1
RHINORRHEA: 0

## 2021-01-10 NOTE — DISCHARGE INSTRUCTIONS
Diagnosis: Dyspnea, possible allergic reaction  What do you do next:   Continue your home medications unless we have specifically changed them  I have prescribed some steroids and Benadryl that you may use at home.  Please take these as directed.  Follow up as indicated below    When do you return: If you have severe chest pain, severe shortness of breath, lightheadedness or fainting, trouble swallowing, or any other symptoms that concern you, please return to the ED for reevaluation.    Thank you for allowing us to care for you today.

## 2021-01-10 NOTE — ED TRIAGE NOTES
Pt presents to ED with . Pt states that she was in the shower this morning and felt like her throat was closing up. This sensation has now subsided for the most part. Pt also c/o L breast pain that has now subsided. ABC intact. A/O x4.

## 2021-01-10 NOTE — ED AVS SNAPSHOT
[facility]  [address]  [facility] [address]  Phone: [phone]  Fax: [phone]                                    Wen Sanchez   MRN: [id number]    Department: [facility]   Date of Visit: 1/10/2021           After Visit Summary Signature Page    I have received my discharge instructions, and my questions have been answered. I have discussed any challenges I see with this plan with the nurse or doctor.    ..........................................................................................................................................  Patient/Patient Representative Signature      ..........................................................................................................................................  Patient Representative Print Name and Relationship to Patient    ..................................................               ................................................  Date                                   Time    ..........................................................................................................................................  Reviewed by Signature/Title    ...................................................              ..............................................  Date                                               Time          22EPIC Rev 08/18

## 2021-01-10 NOTE — ED PROVIDER NOTES
"  History   Chief Complaint:  Throat Tightness & Shortness of Breath    The history is provided by the patient.     Wen Sanchez is a 60 year old female who presents for evaluation after an episode of throat tightness and shortness of breath. Around 0930 this morning, approximately 30 minutes prior to arrival, she reports feeling the beginnings of a throat tightness while preparing for a shower. Once she was in the shower, she reports the tightening worsened and she started feeling short of breath, as if she could not get air in. Unsure if was an anxiety attack or something more severe, she decided to present to the ED for evaluation. Here, she reports the throat tightness is still present, but has lessened in intensity. She denies any chest pain, abdominal pain, nausea, vomiting, or other current symptoms. Just before taking the shower, she does report biting off a piece of NewSkin liquid bandage that was flaking off her thumb in an effort to remove it. She denies swallowing it but does note a history of allergic reactions to a variety of substances. She denies personal history of asthma or other lung disease.     She also reports a left sided chest/breast pain which has been present for a few weeks. She attributes this to her job as she works for the [employer] and does a lot of repetitive movements. She has been seeing a physical therapist for work related back pains and saw her PCP last week for this chest pain who was not concerned about it. At that same PCP encounter, she reports having a discussion about a \"missed heart beat\" she had been having for awhile. They took an EKG and she reports her PCP was also not concerned about it in comparison with past studies. She reports sometimes feeling this missed heart beat in her throat, but she did not feel it today. She also denies feeling any palpitations during today's event.     Allergies:  Sesame Oil  Cephalosporins  Nickel    Medications:    The patient " "is currently on no regular medications.     Past Medical History:    GERD  Kidney stones     Past Surgical History:    Breast enlargement   Upper GI endoscopy   Cholecystectomy      Family History:    Diabetes  Cervical cancer  Hypertension   Stroke     Social History:  Marital Status: .   Presents with .   Denies tobacco use.     Review of Systems   Constitutional: Negative for fever.   HENT: Positive for trouble swallowing (\"throat tightness\"). Negative for congestion, rhinorrhea and voice change.    Respiratory: Positive for shortness of breath. Negative for cough.    Cardiovascular: Negative for chest pain and palpitations.   Gastrointestinal: Negative for abdominal pain, nausea and vomiting.   Psychiatric/Behavioral: The patient is nervous/anxious.    All other systems reviewed and are negative.    Physical Exam     Patient Vitals for the past 24 hrs:   BP Temp Pulse Resp SpO2   01/10/21 1345 -- -- 64 19 95 %   01/10/21 1330 -- -- 72 22 93 %   01/10/21 1315 -- -- 71 19 95 %   01/10/21 1300 (!) 127/98 -- 64 15 96 %   01/10/21 1100 (!) 141/98 -- 67 17 99 %   01/10/21 1045 (!) 156/102 -- 73 25 97 %   01/10/21 1030 (!) 162/120 -- 83 -- 98 %   01/10/21 1015 (!) 153/94 -- 69 -- 99 %   01/10/21 0953 (!) 154/101 97.3  F (36.3  C) 74 16 98 %        Physical Exam  Constitutional: Vital signs reviewed as above.   Head: No external signs of trauma. No lesions noted.  Eyes: PEERL, EOMI B/L, no pain or limitation of superior gaze  ENT:   Nose: Noncongested, no exudates. No rhinorrhea. No FB noted   Mouth/Throat:     Mucous membranes are moist and normal.     No Oropharyngeal exudate. No oropharyngeal erythema noted.    No tonsilar swelling noted.     No uvular deviation noted.    No swelling noted on the floor of the mouth  Neck: FROM. Neck is supple. No stridor noted.  Lymphatic: No posterior cervical LAD noted.   Cardiovascular: Normal rate, regular rhythm and normal heart sounds.  No murmur heard. Equal B/L " peripheral pulses.  Pulmonary/Chest: Effort normal and breath sounds normal. No respiratory distress. Patient has no wheezes. Patient has no rales.   Gastrointestinal: Soft. There is no tenderness.   Musculoskeletal/Extremities: No edema noted. Normal tone.  Neurological: Patient is alert and oriented to person, place, and time.   Skin: Skin is warm and dry. There is no diaphoresis noted.   Psychiatric: The patient appears calm.    Emergency Department Course     ECG:  Indication: Shortness of Breath  Time: 1010  Vent. Rate 72 bpm. FL interval 146. QRS duration 96. QT/QTc 404/442. P-R-T axis 28 35 48.    Normal sinus rhythm   Incomplete RBBB  Anteroseptal infarct, age undetermined   No significant change compared to EKGs dated 9/28/2020 and 10/21/2018.. Read time: 1017.    Imaging:  Chest XR, PA & LAT:  There are no acute infiltrates. The cardiac silhouette is  not enlarged. Pulmonary vasculature is unremarkable.    Laboratory:  CBC: WBC 6.1, HGB: 14.7, PLT: 301  BMP: All WNL (Creatinine: 0.61)    Troponin (Collected at 1016): <0.015   Troponin (Collected at 1234): <0.015     D dimer: <0.3    Symptomatic Influenza A/B & COVID-19 PCR Multiplex: All Negative    Emergency Department Course:    Reviewed:  I reviewed the patient's nursing notes, vitals, past medical records, and Care Everywhere.     Assessments & Consults:     ED Course as of Osmel 10 1358   Sun Osmel 10, 2021   0958 Exam performed, as documented above. History obtained as well.       1108 Patient reassessed and updated on the results of her laboratory work up thus far. She notes that her breathing/throat tightness has improved.      1137 Patient reassessed and discussed the results of the rest of her work up. Discussed plan for repeat troponin at 12:16.      1318 Patient reassessed and discussed reasons to return to the ED.           Interventions:  1027: Benadryl, 50 mg, IV injection   1027: solu-Medrol, 125 mg, IV injection   1027: Pepcid, 20 mg, IV  "injection   1027: aspirin, 324 mg, PO  1036: epinephrine, 0.3 mg, IM injection    Disposition:  The patient was discharged to home.     Impression & Plan      Covid-19  Wen Sanchez was evaluated during a global COVID-19 pandemic, which necessitated consideration that the patient might be at risk for infection with the SARS-CoV-2 virus that causes COVID-19.   Applicable protocols for evaluation were followed during the patient's care.   COVID-19 was considered as part of the patient's evaluation. The plan for testing is:  a test was obtained during this visit.    Medical Decision Making:   Wen Sanchez is a 60 year old female presented to the Emergency Department with a complaint of dyspnea/concern for allergic reaction. Fortunately the workup in the ED has been unremarkable and at this time I am not concerned for ACS. The EKG shows findings as above without significant change from her previous. The troponin is negative x2, and the patient is low risk per EDACS.    I considered other possible causes of chest pain including PE (negative dimer), infection, pneumothorax, aortic dissection, and even more benign causes such as reflux and esophageal motility issues.  The patient was concern for an allergic reaction though was unable to pinpoint any specific allergen other than removing some \"new skin liquid bandage that had been applied sometime ago.  She did get medications for allergic reaction because of her symptoms and did seem to indicate that she felt better so she was discharged with prednisone and Benadryl.  At this time I believe the patient is stable for discharge. I have encouraged close Primary Care Physician follow up. Anticipatory guidance given prior to discharge.       Diagnosis:     ICD-10-CM    1. Dyspnea, unspecified type  R06.00        Discharge Medications:  New Prescriptions    DIPHENHYDRAMINE (BENADRYL) 25 MG TABLET    Take 1 tablet (25 mg) by mouth every 6 hours as needed for allergies "    PREDNISONE (DELTASONE) 20 MG TABLET    Take two tablets (= 40mg) each day for 5 (five) days      Scribe Disclosure:  I, Chante Muro, am serving as a scribe on 1/10/2021 at 9:58 AM to personally document services performed by Cecilio Dia DO based on my observations and the provider's statements to me.      1/10/2021   EMERGENCY DEPARTMENT     Cecilio Dia DO  01/10/21 1400

## 2021-01-11 LAB — INTERPRETATION ECG - MUSE: NORMAL

## 2021-01-14 ENCOUNTER — HOSPITAL ENCOUNTER (OUTPATIENT)
Dept: MAMMOGRAPHY | Facility: CLINIC | Age: 61
Discharge: HOME | End: 2021-01-14
Attending: FAMILY MEDICINE | Admitting: FAMILY MEDICINE
Payer: COMMERCIAL

## 2021-01-14 DIAGNOSIS — Z12.31 VISIT FOR SCREENING MAMMOGRAM: ICD-10-CM

## 2021-01-14 PROCEDURE — 77067 SCR MAMMO BI INCL CAD: CPT

## 2021-03-23 ENCOUNTER — IMMUNIZATION (OUTPATIENT)
Dept: NURSING | Facility: CLINIC | Age: 61
End: 2021-03-23
Payer: COMMERCIAL

## 2021-03-23 PROCEDURE — 0001A PR COVID VAC PFIZER DIL RECON 30 MCG/0.3 ML IM: CPT

## 2021-03-23 PROCEDURE — 91300 PR COVID VAC PFIZER DIL RECON 30 MCG/0.3 ML IM: CPT

## 2021-04-13 ENCOUNTER — IMMUNIZATION (OUTPATIENT)
Dept: NURSING | Facility: CLINIC | Age: 61
End: 2021-04-13
Attending: INTERNAL MEDICINE
Payer: COMMERCIAL

## 2021-04-13 PROCEDURE — 91300 PR COVID VAC PFIZER DIL RECON 30 MCG/0.3 ML IM: CPT

## 2021-04-13 PROCEDURE — 0002A PR COVID VAC PFIZER DIL RECON 30 MCG/0.3 ML IM: CPT

## 2021-05-10 ENCOUNTER — OFFICE VISIT (OUTPATIENT)
Dept: FAMILY MEDICINE | Facility: CLINIC | Age: 61
End: 2021-05-10

## 2021-05-10 VITALS
RESPIRATION RATE: 20 BRPM | HEART RATE: 72 BPM | DIASTOLIC BLOOD PRESSURE: 80 MMHG | WEIGHT: 136.2 LBS | BODY MASS INDEX: 23.25 KG/M2 | HEIGHT: 64 IN | SYSTOLIC BLOOD PRESSURE: 132 MMHG | TEMPERATURE: 97.7 F

## 2021-05-10 DIAGNOSIS — T78.40XD ALLERGIC REACTION, SUBSEQUENT ENCOUNTER: ICD-10-CM

## 2021-05-10 DIAGNOSIS — Z23 NEED FOR TETANUS BOOSTER: ICD-10-CM

## 2021-05-10 DIAGNOSIS — E16.2 HYPOGLYCEMIA: Primary | ICD-10-CM

## 2021-05-10 LAB — HBA1C MFR BLD: 5.1 % (ref 4–7)

## 2021-05-10 PROCEDURE — 90471 IMMUNIZATION ADMIN: CPT | Performed by: FAMILY MEDICINE

## 2021-05-10 PROCEDURE — 83036 HEMOGLOBIN GLYCOSYLATED A1C: CPT | Performed by: FAMILY MEDICINE

## 2021-05-10 PROCEDURE — 36415 COLL VENOUS BLD VENIPUNCTURE: CPT | Performed by: FAMILY MEDICINE

## 2021-05-10 PROCEDURE — 90714 TD VACC NO PRESV 7 YRS+ IM: CPT | Performed by: FAMILY MEDICINE

## 2021-05-10 PROCEDURE — 99213 OFFICE O/P EST LOW 20 MIN: CPT | Mod: 25 | Performed by: FAMILY MEDICINE

## 2021-05-10 ASSESSMENT — MIFFLIN-ST. JEOR: SCORE: 1162.8

## 2021-05-10 NOTE — NURSING NOTE
Wen Sanchez is here for possible low glucose levels. Also has had been sometimes tight in the chest area and hard to breath. Not sure if it is anxiety or low blood sugar.

## 2021-05-10 NOTE — NURSING NOTE
Questioned patient about current smoking habits.  Pt. has never smoked.  PULSE regular  My Chart: active  CLASSIFICATION OF OVERWEIGHT AND OBESITY BY BMI                        Obesity Class           BMI(kg/m2)  Underweight                                    < 18.5  Normal                                         18.5-24.9  Overweight                                     25.0-29.9  OBESITY                     I                  30.0-34.9                             II                 35.0-39.9  EXTREME OBESITY             III                >40                            Patient's  BMI Body mass index is 23.38 kg/m .  http://hin.nhlbi.nih.gov/menuplanner/menu.cgi  Pre-visit planning  Immunizations - needs Td  Colonoscopy - is up to date  Mammogram - is up to date  Asthma -   PHQ9 -    CLAUDIA-7 -    Hearing Test -

## 2021-05-10 NOTE — PROGRESS NOTES
"SUBJECTIVE:  Wen Sanchez, a 60 year old female scheduled an appointment to discuss the following issues:  Hypoglycemia  Pt has long history of lower blood sugars- had been told in past to eat as treatment.      Pt retired 6 weeks ago    Now gets up, drinks coffee, exercises on treadmill and then eats toast with jelly.      Pt had a sugar of 45 a few weeks ago after exercise-she felt shaky and sick.    Pt states now she has had some episodes with chest pressure and shortness of breath after getting shaky  Medical, social, surgical, and family histories reviewed.    ROS:  CONSTITUTIONAL: NEGATIVE for fever, chills  EYES: NEGATIVE for vision changes   RESP: NEGATIVE for significant cough or SOB  CV: NEGATIVE for chest pain, palpitations   PSYCHIATRIC: anxiety    OBJECTIVE:  /80 (BP Location: Right arm, Patient Position: Chair, Cuff Size: Adult Regular)   Pulse 72   Temp 97.7  F (36.5  C)   Resp 20   Ht 1.626 m (5' 4\")   Wt 61.8 kg (136 lb 3.2 oz)   LMP 10/14/2013   BMI 23.38 kg/m    EXAM:  GENERAL APPEARANCE: healthy, alert and no distress  EYES: EOMI,  PERRL  HENT: ear canals and TM's normal and nose and mouth without ulcers or lesions  RESP: lungs clear to auscultation - no rales, rhonchi or wheezes  CV: regular rates and rhythm, normal S1 S2, no S3 or S4 and no murmur, click or rub -  PSYCH: mentation appears normal and anxious    Lab Results   Component Value Date    A1C 5.1 05/10/2021          ASSESSMENT/PLAN:  (E16.2) Hypoglycemia  (primary encounter diagnosis)  Comment: pt with symptoms suggestive of reactive hypoglycemia-no sign diabetes  Plan: recommend protein first thing in am, f/u if not improving or worsening     Discussed that I feel some of her issues are anxiety related-she does agree- discussed medication might be an option in the future-she will let me know how things go with more protein     "

## 2021-06-14 ENCOUNTER — OFFICE VISIT (OUTPATIENT)
Dept: FAMILY MEDICINE | Facility: CLINIC | Age: 61
End: 2021-06-14

## 2021-06-14 VITALS
SYSTOLIC BLOOD PRESSURE: 138 MMHG | HEART RATE: 80 BPM | DIASTOLIC BLOOD PRESSURE: 86 MMHG | WEIGHT: 135 LBS | RESPIRATION RATE: 20 BRPM | TEMPERATURE: 98 F | OXYGEN SATURATION: 96 % | BODY MASS INDEX: 23.17 KG/M2

## 2021-06-14 DIAGNOSIS — J02.9 PHARYNGITIS, UNSPECIFIED ETIOLOGY: Primary | ICD-10-CM

## 2021-06-14 PROBLEM — R07.0 THROAT PAIN: Status: ACTIVE | Noted: 2021-06-14

## 2021-06-14 PROBLEM — R07.0 THROAT PAIN: Status: RESOLVED | Noted: 2021-06-14 | Resolved: 2021-06-14

## 2021-06-14 LAB — S PYO AG THROAT QL IA.RAPID: NORMAL

## 2021-06-14 PROCEDURE — 87070 CULTURE OTHR SPECIMN AEROBIC: CPT | Performed by: PHYSICIAN ASSISTANT

## 2021-06-14 PROCEDURE — 99213 OFFICE O/P EST LOW 20 MIN: CPT | Performed by: PHYSICIAN ASSISTANT

## 2021-06-14 PROCEDURE — 87880 STREP A ASSAY W/OPTIC: CPT | Performed by: PHYSICIAN ASSISTANT

## 2021-06-14 PROCEDURE — G2023 SPECIMEN COLLECT COVID-19: HCPCS | Performed by: PHYSICIAN ASSISTANT

## 2021-06-14 NOTE — NURSING NOTE
Chief Complaint   Patient presents with     Pharyngitis     sore throat and ear pain, has been around for the past week or so, feels it may be allergies     Pre-visit Screening:  Immunizations:  up to date  Colonoscopy:  Up to date   Mammogram: is up to date  Asthma Action Test/Plan:  NA  PHQ9:  NA  GAD7:  NA  Questioned patient about current smoking habits Pt. has never smoked.  Ok to leave detailed message on voice mail for today's visit only Yes, phone # [phone]

## 2021-06-14 NOTE — PROGRESS NOTES
Assessment & Plan     Pharyngitis, unspecified etiology    - Rapid Strep Screen ([facility])  - Throat Culture ( [facility])  - SARS CoV2 CoVid 19 Qual  ([facility])    COVID testing advised.    Symptomatic cares advised including Increase fluids, rest, acetominophen or ibuprofen prn if not contraindicated.     If applicable advised the following:  Sore throat:  sugar free throat lozenges, sprays, Chloraseptic lozenges, salt water gargles    The patient is to RTC prn if these symptoms worsen or fail to improve as anticipated.         MEKA Lora  [facility]    Subjective     Nursing Notes:   Elena Cespedes CMA  6/14/2021 12:14 PM  Signed  Chief Complaint   Patient presents with     Pharyngitis     sore throat and ear pain, has been around for the past week or so, feels it may be allergies     Pre-visit Screening:  Immunizations:  up to date  Colonoscopy:  Up to date   Mammogram: is up to date  Asthma Action Test/Plan:  NA  PHQ9:  NA  GAD7:  NA  Questioned patient about current smoking habits Pt. has never smoked.  Ok to leave detailed message on voice mail for today's visit only Yes, phone # [phone]           Wen Sanchez is a 60 year old female who presents to clinic today for the following health issues:     HPI     Pt is here with 7-10 days of left sided ear pain and sore throat. Her granddaughter was sick several weeks ago with fever. Her  was ill a few days before she was, his sx were fatigue and hot flashes. Both pt and her  are fully COVID vaccinated. He was not seen for eval, sx improved.   She denies fevers.  No strep exposures.      OTC ibuprofen prn.       Review of Systems     CONSTITUTIONAL: NEGATIVE for fever, chills, change in weight  EYES: NEGATIVE for vision changes or irritation  RESP: NEGATIVE for significant cough or SOB  CV: NEGATIVE for chest pain, palpitations or peripheral edema        Objective    /86 (BP Location: Left arm, Patient Position:  Sitting, Cuff Size: Adult Regular)   Pulse 80   Temp 98  F (36.7  C) (Temporal)   Resp 20   Wt 61.2 kg (135 lb)   LMP 10/14/2013   SpO2 96%   BMI 23.17 kg/m    Body mass index is 23.17 kg/m .  Physical Exam     GENERAL: healthy, alert and no distress  EYES: Eyes grossly normal to inspection, PERRL and conjunctivae and sclerae normal  HENT: right cerumen impaction, left TM normal. nose and mouth without ulcers or lesions  NECK: no adenopathy, no asymmetry, masses, or scars and thyroid normal to palpation  RESP: lungs clear to auscultation - no rales, rhonchi or wheezes  CV: regular rate and rhythm, normal S1 S2, no S3 or S4, no murmur, click or rub, no peripheral edema and peripheral pulses strong  MS: no gross musculoskeletal defects noted, no edema  SKIN: no suspicious lesions or rashes  NEURO: Normal strength and tone, mentation intact and speech normal  PSYCH: mentation appears normal, affect normal/bright    Results for orders placed or performed in visit on 06/14/21 (from the past 24 hour(s))   Rapid Strep Screen ([facility])    Specimen: Swab   Result Value Ref Range    Rapid Strep A Screen neg neg

## 2021-06-15 LAB — SARS-COV-2 RNA SPEC QL NAA+PROBE: NOT DETECTED

## 2021-06-16 LAB — STREP GROUP A CULTURE, THROAT - QUEST: NORMAL

## 2021-07-03 ENCOUNTER — HEALTH MAINTENANCE LETTER (OUTPATIENT)
Age: 61
End: 2021-07-03

## 2021-07-27 ENCOUNTER — OFFICE VISIT (OUTPATIENT)
Dept: FAMILY MEDICINE | Facility: CLINIC | Age: 61
End: 2021-07-27

## 2021-07-27 VITALS
HEIGHT: 64 IN | TEMPERATURE: 98.5 F | DIASTOLIC BLOOD PRESSURE: 68 MMHG | HEART RATE: 62 BPM | OXYGEN SATURATION: 97 % | BODY MASS INDEX: 22.84 KG/M2 | SYSTOLIC BLOOD PRESSURE: 124 MMHG | WEIGHT: 133.8 LBS

## 2021-07-27 DIAGNOSIS — E78.2 MIXED HYPERLIPIDEMIA: ICD-10-CM

## 2021-07-27 DIAGNOSIS — Z00.00 ROUTINE GENERAL MEDICAL EXAMINATION AT A HEALTH CARE FACILITY: Primary | ICD-10-CM

## 2021-07-27 LAB
ALBUMIN SERPL-MCNC: 4.7 G/DL (ref 3.6–5.1)
ALBUMIN/GLOB SERPL: 1.7 {RATIO} (ref 1–2.5)
ALP SERPL-CCNC: 80 U/L (ref 33–130)
ALT 1742-6: 17 U/L (ref 0–32)
AST 1920-8: 18 U/L (ref 0–35)
BILIRUB SERPL-MCNC: 0.8 MG/DL (ref 0.2–1.2)
BUN SERPL-MCNC: 21 MG/DL (ref 7–25)
BUN/CREATININE RATIO: 27.3 (ref 6–22)
CALCIUM SERPL-MCNC: 10.1 MG/DL (ref 8.6–10.3)
CHLORIDE SERPLBLD-SCNC: 105 MMOL/L (ref 98–110)
CHOLEST SERPL-MCNC: 258 MG/DL (ref 0–199)
CHOLEST/HDLC SERPL: 3 {RATIO} (ref 0–5)
CO2 SERPL-SCNC: 29.2 MMOL/L (ref 20–32)
CREAT SERPL-MCNC: 0.77 MG/DL (ref 0.6–1.3)
GLOBULIN, CALCULATED - QUEST: 2.8 (ref 1.9–3.7)
GLUCOSE SERPL-MCNC: 88 MG/DL (ref 60–99)
HDLC SERPL-MCNC: 89 MG/DL (ref 40–150)
LDLC SERPL CALC-MCNC: 150 MG/DL (ref 0–130)
POTASSIUM SERPL-SCNC: 4.3 MMOL/L (ref 3.5–5.3)
PROT SERPL-MCNC: 7.5 G/DL (ref 6.1–8.1)
SODIUM SERPL-SCNC: 141.1 MMOL/L (ref 135–146)
TRIGL SERPL-MCNC: 94 MG/DL (ref 0–149)

## 2021-07-27 PROCEDURE — 88142 CYTOPATH C/V THIN LAYER: CPT | Mod: 90 | Performed by: FAMILY MEDICINE

## 2021-07-27 PROCEDURE — 36415 COLL VENOUS BLD VENIPUNCTURE: CPT | Performed by: FAMILY MEDICINE

## 2021-07-27 PROCEDURE — 87624 HPV HI-RISK TYP POOLED RSLT: CPT | Mod: 90 | Performed by: FAMILY MEDICINE

## 2021-07-27 PROCEDURE — 99396 PREV VISIT EST AGE 40-64: CPT | Performed by: FAMILY MEDICINE

## 2021-07-27 PROCEDURE — 80061 LIPID PANEL: CPT | Performed by: FAMILY MEDICINE

## 2021-07-27 PROCEDURE — 80053 COMPREHEN METABOLIC PANEL: CPT | Performed by: FAMILY MEDICINE

## 2021-07-27 RX ORDER — FEXOFENADINE HCL 180 MG/1
180 TABLET ORAL DAILY
COMMUNITY
End: 2022-06-01

## 2021-07-27 ASSESSMENT — MIFFLIN-ST. JEOR: SCORE: 1151.91

## 2021-07-27 NOTE — PROGRESS NOTES
SUBJECTIVE:   CC: Wen Sanchez is an 60 year old woman who presents for preventive health visit.       Patient has been advised of split billing requirements and indicates understanding: Yes  Healthy Habits:    Do you get at least three servings of calcium containing foods daily (dairy, green leafy vegetables, etc.)? yes    Amount of exercise or daily activities, outside of work: 7 day(s) per week    Problems taking medications regularly No    Medication side effects: No    Have you had an eye exam in the past two years? yes    Do you see a dentist twice per year? yes    Do you have sleep apnea, excessive snoring or daytime drowsiness?no      Occasional palpitation-no chest pain or shortness of breath or dizziness    Today's PHQ-2 Score:   PHQ-2 ( 1999 Pfizer) 5/10/2021 12/29/2020   Q1: Little interest or pleasure in doing things 0 0   Q2: Feeling down, depressed or hopeless 0 0   PHQ-2 Score 0 0       Abuse: Current or Past(Physical, Sexual or Emotional)- No  Do you feel safe in your environment? Yes        Social History     Tobacco Use     Smoking status: Never Smoker     Smokeless tobacco: Never Used   Substance Use Topics     Alcohol use: Yes     Alcohol/week: 3.3 standard drinks     Types: 4 Standard drinks or equivalent per week     If you drink alcohol do you typically have >3 drinks per day or >7 drinks per week? No                     Reviewed orders with patient.  Reviewed health maintenance and updated orders accordingly - Yes  BP Readings from Last 3 Encounters:   07/27/21 124/68   06/14/21 138/86   05/10/21 132/80    Wt Readings from Last 3 Encounters:   07/27/21 60.7 kg (133 lb 12.8 oz)   06/14/21 61.2 kg (135 lb)   05/10/21 61.8 kg (136 lb 3.2 oz)                  Patient Active Problem List   Diagnosis     Esophageal reflux     Hypoglycemia     Undiagnosed cardiac murmurs     Calculus of kidney     Health Care Home     Encounter for counseling     ACP (advance care planning)     Symptomatic  menopausal or female climacteric states     Past Surgical History:   Procedure Laterality Date     C MAMMOGRAM, SCREENING  2012     COLONOSCOPY  2010     COLONOSCOPY N/A 7/29/2019    Procedure: COLONOSCOPY;  Surgeon: Mike Hill MD;  Location:  GI     EYE EXAM ESTABLISHED PT  2013      UGI ENDOSCOPY DIAG W OR W/O BRUSH/WASH  2001    Dr. Machuca     LAPAROSCOPIC CHOLECYSTECTOMY N/A 9/18/2019    Procedure: CHOLECYSTECTOMY, LAPAROSCOPIC;  Surgeon: lEena Rico MD;  Location: [facility] OR     TEST NOT FOUND  10/2001    Normal GBUS except for liver hemangioma     ZZC ENLARGE BREAST  9/01       Social History     Tobacco Use     Smoking status: Never Smoker     Smokeless tobacco: Never Used   Substance Use Topics     Alcohol use: Yes     Alcohol/week: 3.3 standard drinks     Types: 4 Standard drinks or equivalent per week     Family History   Problem Relation Age of Onset     Diabetes Mother      Diabetes Father      Cancer Sister         cervical     Heart Disease No family hx of      Lipids No family hx of      Breast Cancer No family hx of      Cancer - colorectal No family hx of      Colon Cancer No family hx of          Current Outpatient Medications   Medication Sig Dispense Refill     fexofenadine (ALLEGRA) 180 MG tablet Take 180 mg by mouth daily       ALPRAZolam (XANAX) 1 MG tablet Take 1 tablet (1 mg) by mouth 3 times daily as needed for anxiety (Patient not taking: Reported on 6/14/2021) 10 tablet 0     EPI E-Z PEN QUIRINO 1:1000  IJ 1 TIME ONLY (Patient not taking: No sig reported) 1 2     Allergies   Allergen Reactions     Sesame Oil Anaphylaxis     Cephalosporins Rash     Reaction was during a surgery, MD thinks the reaction was to the cephalosporin rather than an anesthetic agent  Reaction was severe rash and itching     Nickel Rash     Recent Labs   Lab Test 05/10/21  1412 01/10/21  1016 09/28/20  1512 06/03/20  0000 11/15/18  0840 10/21/18  0850 08/16/17  1902 02/14/17  1012   A1C 5.1  --   --   --    --   --   --   --    LDL  --   --   --  131* 120*  --   --  127   HDL  --   --   --  94 98  --   --  90   TRIG  --   --   --  66 56  --   --  62   ALT  --   --   --   --   --  29 18 17   CR  --  0.61  --  0.75  --  0.74 0.92 0.72   GFRESTIMATED  --  >90  --   --   --  80 69 92   GFRESTBLACK  --  >90  --   --   --  >90  --   --    POTASSIUM  --  3.6  --  4.04  --  3.4 3.9 4.0   TSH  --   --  1.83  --   --   --   --   --         FHS-7: No flowsheet data found.    Mammogram Screening: Recommended mammography every 1-2 years with patient discussion and risk factor consideration  Pertinent mammograms are reviewed under the imaging tab.    Pertinent mammograms are reviewed under the imaging tab.  History of abnormal Pap smear: NO - age 30- 65 PAP every 3 years recommended     Reviewed and updated as needed this visit by clinical staff  Tobacco  Allergies  Meds  Problems  Med Hx  Surg Hx  Fam Hx          Reviewed and updated as needed this visit by Provider                Past Medical History:   Diagnosis Date     Family history of diabetes mellitus     Mother and Father     Gastroesophageal reflux disease      Kidney stones       Past Surgical History:   Procedure Laterality Date     C MAMMOGRAM, SCREENING  2012     COLONOSCOPY  2010     COLONOSCOPY N/A 7/29/2019    Procedure: COLONOSCOPY;  Surgeon: Mike Hill MD;  Location:  GI     EYE EXAM ESTABLISHED PT  2013      UGI ENDOSCOPY DIAG W OR W/O BRUSH/WASH  2001    Dr. Machuca     LAPAROSCOPIC CHOLECYSTECTOMY N/A 9/18/2019    Procedure: CHOLECYSTECTOMY, LAPAROSCOPIC;  Surgeon: Elnea Rico MD;  Location:  OR     TEST NOT FOUND  10/2001    Normal GBUS except for liver hemangioma     ZZC ENLARGE BREAST  9/01       ROS:  CONSTITUTIONAL: NEGATIVE for fever, chills, change in weight  INTEGUMENTARY/SKIN: NEGATIVE for worrisome rashes, moles or lesions  EYES: NEGATIVE for vision changes or irritation  ENT: NEGATIVE for ear, mouth and throat  "problems  RESP: NEGATIVE for significant cough or SOB  BREAST: NEGATIVE for masses, tenderness or discharge  CV: NEGATIVE for chest pain, palpitations or peripheral edema  GI: NEGATIVE for nausea, abdominal pain, heartburn, or change in bowel habits  : NEGATIVE for unusual urinary or vaginal symptoms. No vaginal bleeding.  MUSCULOSKELETAL: NEGATIVE for significant arthralgias or myalgia  NEURO: NEGATIVE for weakness, dizziness or paresthesias  ENDOCRINE: NEGATIVE for temperature intolerance, skin/hair changes  PSYCHIATRIC: NEGATIVE for changes in mood or affect     OBJECTIVE:   /68 (BP Location: Left arm, Patient Position: Sitting, Cuff Size: Adult Large)   Pulse 62   Temp 98.5  F (36.9  C) (Oral)   Ht 1.626 m (5' 4\")   Wt 60.7 kg (133 lb 12.8 oz)   LMP 10/14/2013   SpO2 97%   BMI 22.97 kg/m    EXAM:  GENERAL: healthy, alert and no distress  EYES: Eyes grossly normal to inspection, PERRL and conjunctivae and sclerae normal  HENT: ear canals and TM's normal, nose and mouth without ulcers or lesions  NECK: no adenopathy, no asymmetry, masses, or scars and thyroid normal to palpation  RESP: lungs clear to auscultation - no rales, rhonchi or wheezes  BREAST: normal without masses, tenderness or nipple discharge and no palpable axillary masses or adenopathy  CV: regular rate and rhythm, normal S1 S2, no S3 or S4, no murmur, click or rub, no peripheral edema and peripheral pulses strong  ABDOMEN: soft, nontender, no hepatosplenomegaly, no masses and bowel sounds normal   (female): normal female external genitalia, normal urethral meatus, vaginal mucosa, normal cervix/adnexa/uterus without masses or discharge  MS: no gross musculoskeletal defects noted, no edema  SKIN: no suspicious lesions or rashes  NEURO: Normal strength and tone, mentation intact and speech normal  PSYCH: mentation appears normal, affect normal/bright    Diagnostic Test Results:  Labs reviewed in Epic    ASSESSMENT/PLAN:   (Z00.00) " "Routine general medical examination at a health care facility  (primary encounter diagnosis)  Comment: discussed preventitive healthcare   Plan: ThinPrep Pap and HPV (mRNa E6/E7) Continue to work on healthy diet and exercise, discussed healthy habits ([facility])            (E78.2) Mixed hyperlipidemia  Comment: control uncertain  Plan: Lipid Panel ([facility]), Comprehensive Metobolic         Panel ([facility]), VENOUS COLLECTION        Continue to work on healthy diet and exercise, discussed healthy habits       Patient has been advised of split billing requirements and indicates understanding: Yes  COUNSELING:   Reviewed preventive health counseling, as reflected in patient instructions       Regular exercise       Healthy diet/nutrition       Vision screening       Colon cancer screening    Estimated body mass index is 22.97 kg/m  as calculated from the following:    Height as of this encounter: 1.626 m (5' 4\").    Weight as of this encounter: 60.7 kg (133 lb 12.8 oz).        She reports that she has never smoked. She has never used smokeless tobacco.      Counseling Resources:  ATP IV Guidelines  Pooled Cohorts Equation Calculator  Breast Cancer Risk Calculator  BRCA-Related Cancer Risk Assessment: FHS-7 Tool  FRAX Risk Assessment  ICSI Preventive Guidelines  Dietary Guidelines for Americans, 2010  USDA's MyPlate  ASA Prophylaxis  Lung CA Screening    Mike Beltran MD  [facility]  "

## 2021-07-30 LAB
CLINICAL HISTORY - QUEST: NORMAL
COMMENT - QUEST: NORMAL
CYTOTECHNOLOGIST - QUEST: NORMAL
DESCRIPTIVE DIAGNOSIS - QUEST: NORMAL
HPV MRNA E6/E7: NOT DETECTED
LAST PAP DX - QUEST: NORMAL
LMP - QUEST: NORMAL
PREV BX DX - QUEST: NORMAL
SOURCE: NORMAL
STATEMENT OF ADEQUACY - QUEST: NORMAL

## 2021-08-05 ENCOUNTER — OFFICE VISIT (OUTPATIENT)
Dept: FAMILY MEDICINE | Facility: CLINIC | Age: 61
End: 2021-08-05

## 2021-08-05 VITALS
HEART RATE: 74 BPM | TEMPERATURE: 97.4 F | SYSTOLIC BLOOD PRESSURE: 120 MMHG | DIASTOLIC BLOOD PRESSURE: 60 MMHG | BODY MASS INDEX: 22.83 KG/M2 | OXYGEN SATURATION: 96 % | WEIGHT: 133 LBS

## 2021-08-05 DIAGNOSIS — J35.8 TONSIL STONE: ICD-10-CM

## 2021-08-05 DIAGNOSIS — H69.92 DYSFUNCTION OF LEFT EUSTACHIAN TUBE: ICD-10-CM

## 2021-08-05 DIAGNOSIS — K21.9 GASTROESOPHAGEAL REFLUX DISEASE WITHOUT ESOPHAGITIS: ICD-10-CM

## 2021-08-05 DIAGNOSIS — J02.9 SORE THROAT: Primary | ICD-10-CM

## 2021-08-05 LAB
ERYTHROCYTE [DISTWIDTH] IN BLOOD BY AUTOMATED COUNT: 12.5 %
HCT VFR BLD AUTO: 46.8 % (ref 35–47)
HEMOGLOBIN: 14.8 G/DL (ref 11.7–15.7)
MCH RBC QN AUTO: 29.6 PG (ref 26–33)
MCHC RBC AUTO-ENTMCNC: 31.9 G/DL (ref 31–36)
MCV RBC AUTO: 92.8 FL (ref 78–100)
PLATELET COUNT - QUEST: 181 10^9/L (ref 150–375)
RBC # BLD AUTO: 5 10*12/L (ref 3.8–5.2)
S PYO AG THROAT QL IA.RAPID: NEGATIVE
WBC # BLD AUTO: 8 10*9/L (ref 4–11)

## 2021-08-05 PROCEDURE — 87070 CULTURE OTHR SPECIMN AEROBIC: CPT | Performed by: PHYSICIAN ASSISTANT

## 2021-08-05 PROCEDURE — 87880 STREP A ASSAY W/OPTIC: CPT | Performed by: PHYSICIAN ASSISTANT

## 2021-08-05 PROCEDURE — 85027 COMPLETE CBC AUTOMATED: CPT | Performed by: PHYSICIAN ASSISTANT

## 2021-08-05 PROCEDURE — 36415 COLL VENOUS BLD VENIPUNCTURE: CPT | Performed by: PHYSICIAN ASSISTANT

## 2021-08-05 PROCEDURE — 99213 OFFICE O/P EST LOW 20 MIN: CPT | Performed by: PHYSICIAN ASSISTANT

## 2021-08-05 NOTE — NURSING NOTE
Chief Complaint   Patient presents with     Throat Problem     one white spot in the back of throat comes and goes

## 2021-08-05 NOTE — PROGRESS NOTES
Assessment & Plan     1. Sore throat    2. Tonsil stone    3. Dysfunction of left eustachian tube    4. Gastroesophageal reflux disease without esophagitis      Recommend Ibuprofen or Tylenol as tolerated for pain relief. Chloraseptic, cough drops advised.    Discussed tonsil stone and tx.  Referred to ENT for continued throat discomfort  Restart Nexium  Start Flonase  Continue Antihistamine.     MEKA Lora  [facility]    Subjective     Nursing Notes:   Ingris Chin CMA  8/5/2021  2:01 PM  Signed  Chief Complaint   Patient presents with     Throat Problem     one white spot in the back of throat comes and goes            Wen LINO Sanchez is a 60 year old female who presents to clinic today for the following health issues:     HPI     Was seen by me for sore throat in July  COVID and strep negative  Sx improved then returned    Throat feeling scratchy/sore in the am  As the day goes on throat feels better.     Had white spot in throat that she noticed today    States left ear bothers her, mild pain in the last month  Intermittent pain since throat hurt.     OTC: Allegra did help  No hx of seasonal allergies.     Was on Nexium  Stopped after seeing Dr. Estrada    Intermittent acid reflux  Taking Tums  Spicy foods/tomatoes/salmon       Do you have any of the following symptoms in the last 7 days?    Fever or chills: No  Cough: No  Shortness of breath or difficulty breathing: No  Fatigue: No  Muscle or body aches: No  Headache: No  New loss of taste or smell: No  Sore throat: Yes  Congestion or runny nose: Yes  Nausea or vomiting: No  Diarrhea: No     COVID Vaccination complete.           Objective    /60 (BP Location: Left arm, Patient Position: Sitting, Cuff Size: Adult Regular)   Pulse 74   Temp 97.4  F (36.3  C)   Wt 60.3 kg (133 lb)   LMP 10/14/2013   SpO2 96%   BMI 22.83 kg/m    Body mass index is 22.83 kg/m .  Physical Exam   GENERAL: healthy, alert and no  distress  EYES: Eyes grossly normal to inspection, PERRL and conjunctivae and sclerae normal  HENT: ear canals and Left TM normal (right TM with cerumen), nose and mouth without ulcers or lesions  There is a 1 mm tonsil stone in left tonsil  NECK: no adenopathy, no asymmetry, masses, or scars and thyroid normal to palpation  RESP: lungs clear to auscultation - no rales, rhonchi or wheezes  CV: regular rate and rhythm, normal S1 S2, no S3 or S4, no murmur, click or rub, no peripheral edema and peripheral pulses strong  MS: no gross musculoskeletal defects noted, no edema

## 2021-08-07 LAB — STREP GROUP A CULTURE, THROAT - QUEST: NORMAL

## 2021-08-11 ENCOUNTER — TELEPHONE (OUTPATIENT)
Dept: FAMILY MEDICINE | Facility: CLINIC | Age: 61
End: 2021-08-11

## 2021-08-11 DIAGNOSIS — K21.9 GASTROESOPHAGEAL REFLUX DISEASE, UNSPECIFIED WHETHER ESOPHAGITIS PRESENT: Primary | ICD-10-CM

## 2021-08-11 NOTE — TELEPHONE ENCOUNTER
Wen Sanchez called the clinic support line with the following:    States that she has started taking the Prilosec OTC again. She is wondering if she can take 2 20mgm tablets in the morning?    Please advise  [phone] (work)

## 2021-08-12 RX ORDER — OMEPRAZOLE 40 MG/1
40 CAPSULE, DELAYED RELEASE ORAL DAILY
Qty: 90 CAPSULE | Refills: 0 | Status: SHIPPED | OUTPATIENT
Start: 2021-08-12 | End: 2021-11-16

## 2021-08-12 NOTE — TELEPHONE ENCOUNTER
Was wondering if you can send in the Rx strength?    Wen Sanchez is requesting a refill of:    Pending Prescriptions:                       Disp   Refills    omeprazole (PRILOSEC) 40 MG DR capsule    90 cap*0            Sig: Take 1 capsule (40 mg) by mouth daily

## 2021-08-12 NOTE — TELEPHONE ENCOUNTER
Please call pt:     Yes, however if not improving on 40 mg omeprazole daily will need another upper endoscopy      Take omeprazole with large glass of water first thing in am, nothing to eat/drink for 1 hour    Trudy Kelsey PA-C  8/12/2021

## 2021-08-13 RX ORDER — ESOMEPRAZOLE MAGNESIUM 40 MG/1
40 CAPSULE, DELAYED RELEASE ORAL
Qty: 90 CAPSULE | Refills: 0 | Status: SHIPPED | OUTPATIENT
Start: 2021-08-13 | End: 2022-06-01

## 2021-08-13 NOTE — TELEPHONE ENCOUNTER
Pt called and is requesting an rx for Nexium. That is what she has taken before and worked well.    She picked up the omeprazole rx yesterday, not knowing that it was Prilosec.    Dipti, could you send in a new rx? Thanks.        Pending Prescriptions:                       Disp   Refills    esomeprazole (NEXIUM) 40 MG DR capsule    90 cap*0            Sig: Take 1 capsule (40 mg) by mouth every morning           (before breakfast) Take 30-60 minutes before           eating.    Signed Prescriptions:                        Disp   Refills    omeprazole (PRILOSEC) 40 MG DR capsule     90 cap*0        Sig: Take 1 capsule (40 mg) by mouth daily  Authorizing Provider: DIPTI TREVIÑO

## 2021-08-20 ENCOUNTER — TRANSFERRED RECORDS (OUTPATIENT)
Dept: FAMILY MEDICINE | Facility: CLINIC | Age: 61
End: 2021-08-20

## 2021-08-23 ENCOUNTER — TRANSFERRED RECORDS (OUTPATIENT)
Dept: FAMILY MEDICINE | Facility: CLINIC | Age: 61
End: 2021-08-23

## 2021-10-23 ENCOUNTER — HEALTH MAINTENANCE LETTER (OUTPATIENT)
Age: 61
End: 2021-10-23

## 2021-10-23 ENCOUNTER — APPOINTMENT (OUTPATIENT)
Dept: GENERAL RADIOLOGY | Facility: CLINIC | Age: 61
End: 2021-10-23
Attending: EMERGENCY MEDICINE
Payer: COMMERCIAL

## 2021-10-23 ENCOUNTER — HOSPITAL ENCOUNTER (EMERGENCY)
Facility: CLINIC | Age: 61
Discharge: HOME | End: 2021-10-23
Attending: EMERGENCY MEDICINE | Admitting: EMERGENCY MEDICINE
Payer: COMMERCIAL

## 2021-10-23 VITALS
HEART RATE: 60 BPM | SYSTOLIC BLOOD PRESSURE: 131 MMHG | BODY MASS INDEX: 23.17 KG/M2 | RESPIRATION RATE: 13 BRPM | OXYGEN SATURATION: 98 % | DIASTOLIC BLOOD PRESSURE: 85 MMHG | WEIGHT: 135 LBS | TEMPERATURE: 97.5 F

## 2021-10-23 DIAGNOSIS — R07.9 CHEST PAIN, UNSPECIFIED TYPE: ICD-10-CM

## 2021-10-23 LAB
ANION GAP SERPL CALCULATED.3IONS-SCNC: 6 MMOL/L (ref 3–14)
BASOPHILS # BLD AUTO: 0 10E3/UL (ref 0–0.2)
BASOPHILS NFR BLD AUTO: 0 %
BUN SERPL-MCNC: 24 MG/DL (ref 7–30)
CALCIUM SERPL-MCNC: 9.4 MG/DL (ref 8.5–10.1)
CHLORIDE BLD-SCNC: 109 MMOL/L (ref 94–109)
CO2 SERPL-SCNC: 25 MMOL/L (ref 20–32)
CREAT SERPL-MCNC: 0.74 MG/DL (ref 0.52–1.04)
EOSINOPHIL # BLD AUTO: 0.1 10E3/UL (ref 0–0.7)
EOSINOPHIL NFR BLD AUTO: 2 %
ERYTHROCYTE [DISTWIDTH] IN BLOOD BY AUTOMATED COUNT: 12.5 % (ref 10–15)
GFR SERPL CREATININE-BSD FRML MDRD: 86 ML/MIN/1.73M2
GLUCOSE BLD-MCNC: 100 MG/DL (ref 70–99)
HCT VFR BLD AUTO: 45.7 % (ref 35–47)
HGB BLD-MCNC: 15 G/DL (ref 11.7–15.7)
HOLD SPECIMEN: NORMAL
IMM GRANULOCYTES # BLD: 0 10E3/UL
IMM GRANULOCYTES NFR BLD: 0 %
LYMPHOCYTES # BLD AUTO: 3.6 10E3/UL (ref 0.8–5.3)
LYMPHOCYTES NFR BLD AUTO: 51 %
MCH RBC QN AUTO: 30.7 PG (ref 26.5–33)
MCHC RBC AUTO-ENTMCNC: 32.8 G/DL (ref 31.5–36.5)
MCV RBC AUTO: 94 FL (ref 78–100)
MONOCYTES # BLD AUTO: 0.5 10E3/UL (ref 0–1.3)
MONOCYTES NFR BLD AUTO: 7 %
NEUTROPHILS # BLD AUTO: 2.8 10E3/UL (ref 1.6–8.3)
NEUTROPHILS NFR BLD AUTO: 40 %
NRBC # BLD AUTO: 0 10E3/UL
NRBC BLD AUTO-RTO: 0 /100
PLATELET # BLD AUTO: 273 10E3/UL (ref 150–450)
POTASSIUM BLD-SCNC: 3.9 MMOL/L (ref 3.4–5.3)
RBC # BLD AUTO: 4.89 10E6/UL (ref 3.8–5.2)
SODIUM SERPL-SCNC: 140 MMOL/L (ref 133–144)
TROPONIN I SERPL-MCNC: <0.015 UG/L (ref 0–0.04)
TROPONIN I SERPL-MCNC: <0.015 UG/L (ref 0–0.04)
WBC # BLD AUTO: 7 10E3/UL (ref 4–11)

## 2021-10-23 PROCEDURE — 99285 EMERGENCY DEPT VISIT HI MDM: CPT | Mod: 25

## 2021-10-23 PROCEDURE — 85025 COMPLETE CBC W/AUTO DIFF WBC: CPT | Performed by: EMERGENCY MEDICINE

## 2021-10-23 PROCEDURE — 93005 ELECTROCARDIOGRAM TRACING: CPT

## 2021-10-23 PROCEDURE — 71046 X-RAY EXAM CHEST 2 VIEWS: CPT

## 2021-10-23 PROCEDURE — 80048 BASIC METABOLIC PNL TOTAL CA: CPT | Performed by: EMERGENCY MEDICINE

## 2021-10-23 PROCEDURE — 36415 COLL VENOUS BLD VENIPUNCTURE: CPT | Performed by: EMERGENCY MEDICINE

## 2021-10-23 PROCEDURE — 84484 ASSAY OF TROPONIN QUANT: CPT | Performed by: EMERGENCY MEDICINE

## 2021-10-23 ASSESSMENT — ENCOUNTER SYMPTOMS
VOMITING: 0
LIGHT-HEADEDNESS: 1
COUGH: 0
NAUSEA: 1
CHILLS: 0
ABDOMINAL PAIN: 0
SHORTNESS OF BREATH: 1
FEVER: 0

## 2021-10-23 NOTE — ED PROVIDER NOTES
History     Chief Complaint:  Chest Pain      HPI   Wen Sanchez is a 61 year old female who presents for evaluation of chest pressure and shortness of breath.  Her symptoms began suddenly around 1 AM this morning and woke her up from sleep.  She developed pressure in her lower chest.  She also felt slightly short of breath and lightheaded.  She has been having similar episodes over the last few days.  It will started when she began having a low blood sugar episode and then felt like she was almost having a panic attack with pressure in her chest.  There is been some radiation to the left neck and left shoulder.  She has a chronic pain in that area related to working in the [employer].  She also history of GERD and is on Nexium.  She was not taking it for a while and has restarted it.  She has no history of coronary artery disease.  She does not have a history of high blood pressure, hyperlipidemia, diabetes, or smoking.  No history of PE or DVTs.  No recent long travel, immobilizations, or surgeries.  No lower extremity swelling or pain.    Review of Systems   Constitutional: Negative for chills and fever.   Respiratory: Positive for shortness of breath. Negative for cough.    Cardiovascular: Positive for chest pain. Negative for leg swelling.   Gastrointestinal: Positive for nausea. Negative for abdominal pain and vomiting.   Skin: Negative for rash.   Neurological: Positive for light-headedness.   All other systems reviewed and are negative.        Allergies:  Sesame Oil  Cephalosporins  Nickel      Medications:    ALPRAZolam (XANAX) 1 MG tablet  EPI E-Z PEN QUIRINO 1:1000  IJ  esomeprazole (NEXIUM) 40 MG DR capsule  fexofenadine (ALLEGRA) 180 MG tablet  omeprazole (PRILOSEC) 40 MG DR capsule        Past Medical History:    Past Medical History:   Diagnosis Date     Family history of diabetes mellitus      Gastroesophageal reflux disease      Kidney stones      Patient Active Problem List    Diagnosis Date  Noted     ACP (advance care planning) 03/14/2014     Priority: Medium                        Symptomatic menopausal or female climacteric states 03/14/2014     Priority: Medium     Encounter for counseling 08/29/2013     Priority: Medium     Discussed advance care planning with patient; however, patient declined at this time.    Problem list name updated by automated process. Provider to review       Health Care Home 10/01/2012     Priority: Medium     State Tier Level:  Tier 1  Status:  n/a  Care Coordinator:   See Letters for H Care Plan             Calculus of kidney 03/23/2006     Priority: Medium     Undiagnosed cardiac murmurs 02/27/2003     Priority: Medium     Hypoglycemia 09/09/2002     Priority: Medium     Problem list name updated by automated process. Provider to review       Esophageal reflux 04/25/2002     Priority: Medium        Past Surgical History:    Past Surgical History:   Procedure Laterality Date     C MAMMOGRAM, SCREENING  2012     COLONOSCOPY  2010     COLONOSCOPY N/A 7/29/2019    Procedure: COLONOSCOPY;  Surgeon: Mike Hill MD;  Location:  GI     EYE EXAM ESTABLISHED PT  2013      UGI ENDOSCOPY DIAG W OR W/O BRUSH/WASH  2001    Dr. Machuca     LAPAROSCOPIC CHOLECYSTECTOMY N/A 9/18/2019    Procedure: CHOLECYSTECTOMY, LAPAROSCOPIC;  Surgeon: Elena Rico MD;  Location:  OR     TEST NOT FOUND  10/2001    Normal GBUS except for liver hemangioma     ZZC ENLARGE BREAST  9/01       Family History:    Family History   Problem Relation Age of Onset     Diabetes Mother      Diabetes Father      Cancer Sister         cervical     Heart Disease No family hx of      Lipids No family hx of      Breast Cancer No family hx of      Cancer - colorectal No family hx of      Colon Cancer No family hx of        Social History:  Presents with .  Retired.    Physical Exam     Patient Vitals for the past 24 hrs:   BP Temp Temp src Pulse Resp SpO2 Weight   10/23/21 0315 123/86 -- -- 58  16 99 % --   10/23/21 0300 130/88 -- -- 67 15 98 % --   10/23/21 0245 (!) 120/93 -- -- 60 20 97 % --   10/23/21 0145 (!) 144/86 -- -- 73 16 98 % --   10/23/21 0127 (!) 157/106 -- -- -- -- -- --   10/23/21 0126 -- 97.5  F (36.4  C) Temporal 82 18 100 % 61.2 kg (135 lb)       Physical Exam  Constitutional: Well appearing.  HEENT: Atraumatic. Moist mucous membranes.  Neck: Soft.  Supple.  No JVD.  Cardiac: Regular rate and rhythm.  No murmur or rub.  Respiratory: Clear to auscultation bilaterally.  No respiratory distress.    Abdomen: Soft and nontender.  Nondistended.  Musculoskeletal: No edema.  Normal range of motion.  Neurologic: Alert and oriented x3.  Normal tone and bulk.    Skin: No rashes.  No edema.  Psych: Normal affect.  Normal behavior.    Emergency Department Course   ECG:  Sinus rhythm with no acute ischemic changes.  Unchanged from previous on 1/10/2021  Interpreted by myself    Imaging:  XR Chest 2 Views   Final Result   IMPRESSION: No acute abnormality.          Laboratory:  Labs Ordered and Resulted from Time of ED Arrival Up to the Time of Departure from the ED   BASIC METABOLIC PANEL - Abnormal; Notable for the following components:       Result Value    Glucose 100 (*)     All other components within normal limits   TROPONIN I - Normal   TROPONIN I - Normal   EXTRA RED TOP TUBE   EXTRA GREEN TOP (LITHIUM HEPARIN) TUBE   EXTRA PURPLE TOP TUBE   CBC WITH PLATELETS AND DIFFERENTIAL   EXTRA TUBE    Narrative:     The following orders were created for panel order Extra Tube ([place] Draw).  Procedure                               Abnormality         Status                     ---------                               -----------         ------                     Extra Red Top Tube[[id number]]                               Final result               Extra Green Top (Lithium...[[id number]]                      Final result               Extra Purple Top Tube[[id number]]                            Final  result                 Please view results for these tests on the individual orders.   CBC WITH PLATELETS & DIFFERENTIAL    Narrative:     The following orders were created for panel order CBC with platelets differential.  Procedure                               Abnormality         Status                     ---------                               -----------         ------                     CBC with platelets and d...[[id number]]                      Final result                 Please view results for these tests on the individual orders.       Procedures:    Emergency Department Course:    Reviewed:  I reviewed nursing notes, vitals and past history         Interventions:  Medications - No data to display    Disposition:  The patient was discharged to home.    Impression & Plan    Medical Decision Making:  Wen Sanchez is a 61-year-old woman who is afebrile and hemodynamically stable.  Her EKG demonstrates a sinus rhythm with no acute ischemic changes on my read.  Initial troponin is within normal limits.  Lab work-up is otherwise reassuring.  Chest x-ray unremarkable for acute disease.  These are atypical symptoms she is actually been having them for the last few days.  She had similar pain when she presented here in January with negative work-up.  She has a heart score of 2.  I discussed this with her.  We discussed plan fora repeat delta troponin which was obtained and was again undetectable.  This excludes ACS.  She has no typical symptoms for PE and I doubt a PE.  She is asymptomatic this time.  Discussed potential GERD symptoms as it occurred at night and she is felt same symptoms over the last few days.  We discussed plan for home with outpatient stress test that was ordered and she is in agreement and understanding.  She also follow-up with a primary care physician.  She feels countable to plan for discharge home.  Questions were answered.  Discussed supportive care at home and return precautions  were given.  She was in no distress at time of discharge.    Diagnosis:    ICD-10-CM    1. Chest pain, unspecified type  R07.9 Exercise Stress Echocardiogram       Discharge Medications:  New Prescriptions    No medications on file          Biju Mcconnell MD  10/23/21 0632

## 2021-10-25 ENCOUNTER — CARE COORDINATION (OUTPATIENT)
Dept: FAMILY MEDICINE | Facility: CLINIC | Age: 61
End: 2021-10-25

## 2021-10-25 LAB
ATRIAL RATE - MUSE: 72 BPM
DIASTOLIC BLOOD PRESSURE - MUSE: NORMAL MMHG
INTERPRETATION ECG - MUSE: NORMAL
P AXIS - MUSE: 67 DEGREES
PR INTERVAL - MUSE: 156 MS
QRS DURATION - MUSE: 92 MS
QT - MUSE: 392 MS
QTC - MUSE: 429 MS
R AXIS - MUSE: 55 DEGREES
SYSTOLIC BLOOD PRESSURE - MUSE: NORMAL MMHG
T AXIS - MUSE: 61 DEGREES
VENTRICULAR RATE- MUSE: 72 BPM

## 2021-10-25 NOTE — PROGRESS NOTES
Care Coordination Initial Assessment    The patient was seen at [facility] ER on 10/23/2021 for chest pain. She was discharged with instructions to follow up with PCP.    PCP: Mike Beltran    Referral Source:  ED/IP List    Utilization:   ED visits in last year: 2  Last PCP Appt.: 8/5/21  Specialist: seeing [facility] and [facility]NT   Upcoming Appt.: Has appt with Dr. Beltran on 10/27/2021    Health Maintenance Reviewed: Yes    Current Medical Health Concerns:   Please see patients current medical problem list.    Patient/Caregiver Understanding: Yes    Medication Management:   Patient has understanding of regimen and is adherent:  Yes    Functional Status:   Independent with all ADL/IADL's    Current Behavioral Health Concerns: No concerns      Patient/Caregiver Understanding:  Yes    Psychosocial:  No concerns-doing well and has good support system within friends and family     Gaps:    Health Maintenance: Needs flu shot     Resources Given:    No resources were needed by the patient at this time.     Plan:   The patient is doing well overall and was able to get an appointment scheduled with Dr. Beltran on 10/27/21 where full follow up and review will be don about the patients ER visit. There were no further questions or concerns at this time .

## 2021-10-27 ENCOUNTER — OFFICE VISIT (OUTPATIENT)
Dept: FAMILY MEDICINE | Facility: CLINIC | Age: 61
End: 2021-10-27

## 2021-10-27 VITALS
TEMPERATURE: 97.3 F | RESPIRATION RATE: 20 BRPM | HEART RATE: 64 BPM | WEIGHT: 136.02 LBS | SYSTOLIC BLOOD PRESSURE: 140 MMHG | DIASTOLIC BLOOD PRESSURE: 80 MMHG | BODY MASS INDEX: 23.22 KG/M2 | HEIGHT: 64 IN

## 2021-10-27 DIAGNOSIS — E16.2 HYPOGLYCEMIA: Primary | ICD-10-CM

## 2021-10-27 DIAGNOSIS — R07.9 CHEST PAIN, UNSPECIFIED TYPE: ICD-10-CM

## 2021-10-27 DIAGNOSIS — K21.9 GASTROESOPHAGEAL REFLUX DISEASE WITHOUT ESOPHAGITIS: ICD-10-CM

## 2021-10-27 PROCEDURE — 99213 OFFICE O/P EST LOW 20 MIN: CPT | Performed by: FAMILY MEDICINE

## 2021-10-27 ASSESSMENT — MIFFLIN-ST. JEOR: SCORE: 1156.98

## 2021-10-27 NOTE — PROGRESS NOTES
SUBJECTIVE:  Wen Sanchez, a 61 year old female scheduled an appointment to discuss the following issues:  Hypoglycemia     Pt had an episode of last week of possible low blood sugar, occurred at 11 am, had eaten toast and peanut butter at 0500 and banana at 0900    [place] hungry at 1100, ate a snack, then felt shaky and short of breath-pt checked blood sugar and it was 64. Pt ate some more and felt a bit better but felt nausea for hours afterward.    PT has had this happen before    PT also notes she was in ED 10/23 with chest pain, notes reviewed, outpt stress echo ordered.  Pt denies chest pain but has noted increased GERD symptoms again.    Pt does note some sensation of acid in throat but doesn't feel the reflux lower -feels the 40 mg Nexium BID not working    Medical, social, surgical, and family histories reviewed.    Patient Active Problem List   Diagnosis     Esophageal reflux     Hypoglycemia     Undiagnosed cardiac murmurs     Calculus of kidney     Health Care Home     Encounter for counseling     ACP (advance care planning)     Symptomatic menopausal or female climacteric states     Past Medical History:   Diagnosis Date     Family history of diabetes mellitus     Mother and Father     Gastroesophageal reflux disease      Kidney stones      Family History   Problem Relation Age of Onset     Diabetes Mother      Diabetes Father      Cancer Sister         cervical     Heart Disease No family hx of      Lipids No family hx of      Breast Cancer No family hx of      Cancer - colorectal No family hx of      Colon Cancer No family hx of      Social History     Socioeconomic History     Marital status:      Spouse name: Not on file     Number of children: 3     Years of education: 12     Highest education level: Not on file   Occupational History     Occupation:      Employer: [employer]     Occupation: retired   Tobacco Use     Smoking status: Never Smoker     Smokeless tobacco: Never Used    Substance and Sexual Activity     Alcohol use: Yes     Alcohol/week: 3.3 standard drinks     Types: 4 Standard drinks or equivalent per week     Drug use: No     Sexual activity: Yes     Partners: Male     Birth control/protection: Pill   Other Topics Concern      Service Not Asked     Blood Transfusions Not Asked     Caffeine Concern Not Asked     Occupational Exposure Not Asked     Hobby Hazards Not Asked     Sleep Concern Not Asked     Stress Concern Not Asked     Weight Concern Not Asked     Special Diet Not Asked     Back Care Not Asked     Exercise Yes     Bike Helmet No     Seat Belt Yes     Self-Exams Yes     Parent/sibling w/ CABG, MI or angioplasty before 65F 55M? Not Asked   Social History Narrative        Functional abiltity:      Hearing imparment:No      Acitvities of daily living:Normal      Risk of falls:No      Home safety of concern:No        Do you exercise?     Yes   Times/week: 2    History of abusive relationships in past:   No    History of abusive relationships currently:    No    Do you feel emotionally and physically safe in your environment?     Yes    Do you own a gun?  No      Is the gun kept in a safe place:   NOT APPLICABLE    Do you wear a seatbelt regularly?     Yes    Do you use sun screen?     Yes         Social Determinants of Health     Financial Resource Strain:      Difficulty of Paying Living Expenses:    Food Insecurity:      Worried About Running Out of Food in the Last Year:      Ran Out of Food in the Last Year:    Transportation Needs:      Lack of Transportation (Medical):      Lack of Transportation (Non-Medical):    Physical Activity:      Days of Exercise per Week:      Minutes of Exercise per Session:    Stress:      Feeling of Stress :    Social Connections:      Frequency of Communication with Friends and Family:      Frequency of Social Gatherings with Friends and Family:      Attends Zoroastrianism Services:      Active Member of Clubs or Organizations:       Attends Club or Organization Meetings:      Marital Status:    Intimate Partner Violence:      Fear of Current or Ex-Partner:      Emotionally Abused:      Physically Abused:      Sexually Abused:      Past Surgical History:   Procedure Laterality Date     C MAMMOGRAM, SCREENING  2012     COLONOSCOPY  2010     COLONOSCOPY N/A 7/29/2019    Procedure: COLONOSCOPY;  Surgeon: Mike Hill MD;  Location:  GI     EYE EXAM ESTABLISHED PT  2013      UGI ENDOSCOPY DIAG W OR W/O BRUSH/WASH  2001    Dr. Machuca     LAPAROSCOPIC CHOLECYSTECTOMY N/A 9/18/2019    Procedure: CHOLECYSTECTOMY, LAPAROSCOPIC;  Surgeon: Elena Rico MD;  Location:  OR     TEST NOT FOUND  10/2001    Normal GBUS except for liver hemangioma     ZZC ENLARGE BREAST  9/01     ALPRAZolam (XANAX) 1 MG tablet, Take 1 tablet (1 mg) by mouth 3 times daily as needed for anxiety  EPI E-Z PEN QUIRINO 1:1000  IJ, 1 TIME ONLY  esomeprazole (NEXIUM) 40 MG DR capsule, Take 1 capsule (40 mg) by mouth every morning (before breakfast) Take 30-60 minutes before eating.  fexofenadine (ALLEGRA) 180 MG tablet, Take 180 mg by mouth daily  omeprazole (PRILOSEC) 40 MG DR capsule, Take 1 capsule (40 mg) by mouth daily    No current facility-administered medications on file prior to visit.       Allergies: Sesame oil, Cephalosporins, and Nickel    Immunization History   Administered Date(s) Administered     COVID-19,PF,Pfizer 03/23/2021, 04/13/2021     Influenza (IIV3) PF 10/22/2009     Influenza Vaccine IM > 6 months Valent IIV4 (Alfuria,Fluzone) 11/18/2014, 11/05/2015     TD (ADULT, 7+) 01/01/1992, 06/11/2003, 05/10/2021     TDAP Vaccine (Boostrix) 05/18/2011     Td (Adult), Adsorbed 07/05/2001, 06/11/2003        ROS:  CONSTITUTIONAL: NEGATIVE for fever, chills  EYES: NEGATIVE for vision changes   RESP: NEGATIVE for significant cough or SOB  CV: NEGATIVE for chest pain, palpitations   GI: NEGATIVE for nausea, abdominal pain, heartburn, or change in bowel habits  :  "NEGATIVE for frequency, dysuria, or hematuria  MUSCULOSKELETAL: NEGATIVE for significant arthralgias or myalgia  NEURO: NEGATIVE for weakness, dizziness or paresthesias or headache  ENDOCRINE: NEGATIVE for temperature intolerance, skin/hair changes  HEME: NEGATIVE for bleeding problems  PSYCHIATRIC: NEGATIVE for changes in mood or affect    OBJECTIVE:  BP (!) 140/80 (BP Location: Left arm, Patient Position: Chair, Cuff Size: Adult Regular)   Pulse 64   Temp 97.3  F (36.3  C)   Resp 20   Ht 1.626 m (5' 4\")   Wt 61.7 kg (136 lb 0.3 oz)   LMP 10/14/2013   BMI 23.35 kg/m    EXAM:  GENERAL APPEARANCE: healthy, alert and no distress  EYES: EOMI,  PERRL  HENT: ear canals and TM's normal and nose and mouth without ulcers or lesions  RESP: lungs clear to auscultation - no rales, rhonchi or wheezes  CV: regular rates and rhythm, normal S1 S2, no S3 or S4 and no murmur, click or rub -  ABDOMEN:  soft, nontender, no HSM or masses and bowel sounds normal  PSYCH: mentation appears normal and affect normal/bright    Lab Results   Component Value Date    A1C 5.1 05/10/2021          ASSESSMENT/PLAN:  (E16.2) Hypoglycemia  (primary encounter diagnosis)  Comment: pt symptoms suggest reactive hypoglycemia- inclear why still occurring with reasonable food intake, wonder if she may be overestimating what she eats, also may have anxiety component  Plan: recommend small frequent meals with protein, check sugar if feel off, see endocrine if issues persist for more formal hypoglycemia testing    (R07.9) Chest pain, unspecified type  Comment: improved but may be more suboptimally ocntrolled GERD  Plan: pt seeing GI Friday, will discuss there    (K21.9) Gastroesophageal reflux disease without esophagitis  Comment: as above  Plan:         "

## 2021-10-27 NOTE — NURSING NOTE
Wen Sanchez is here for another episode of hypoglycemia.    Questioned patient about current smoking habits.  Pt. has never smoked.  PULSE regular  My Chart: active  CLASSIFICATION OF OVERWEIGHT AND OBESITY BY BMI                        Obesity Class           BMI(kg/m2)  Underweight                                    < 18.5  Normal                                         18.5-24.9  Overweight                                     25.0-29.9  OBESITY                     I                  30.0-34.9                             II                 35.0-39.9  EXTREME OBESITY             III                >40                            Patient's  BMI Body mass index is 23.35 kg/m .  http://hin.nhlbi.nih.gov/menuplanner/menu.cgi  Pre-visit planning  Immunizations - up to date  Colonoscopy - is up to date  Mammogram - is up to date  Asthma -   PHQ9 -    CLAUDIA-7 -    Hearing Test -

## 2021-11-02 ENCOUNTER — TRANSFERRED RECORDS (OUTPATIENT)
Dept: FAMILY MEDICINE | Facility: CLINIC | Age: 61
End: 2021-11-02

## 2021-11-12 ENCOUNTER — HOSPITAL ENCOUNTER (OUTPATIENT)
Dept: CARDIOLOGY | Facility: CLINIC | Age: 61
Discharge: HOME | End: 2021-11-12
Attending: EMERGENCY MEDICINE | Admitting: EMERGENCY MEDICINE
Payer: COMMERCIAL

## 2021-11-12 DIAGNOSIS — R07.9 CHEST PAIN, UNSPECIFIED TYPE: ICD-10-CM

## 2021-11-12 PROCEDURE — 93350 STRESS TTE ONLY: CPT | Mod: TC

## 2021-11-12 PROCEDURE — 93016 CV STRESS TEST SUPVJ ONLY: CPT | Performed by: INTERNAL MEDICINE

## 2021-11-12 PROCEDURE — 93325 DOPPLER ECHO COLOR FLOW MAPG: CPT | Mod: TC

## 2021-11-12 PROCEDURE — 93018 CV STRESS TEST I&R ONLY: CPT | Performed by: INTERNAL MEDICINE

## 2021-11-12 PROCEDURE — 93350 STRESS TTE ONLY: CPT | Mod: 26 | Performed by: INTERNAL MEDICINE

## 2021-11-12 PROCEDURE — 93325 DOPPLER ECHO COLOR FLOW MAPG: CPT | Mod: 26 | Performed by: INTERNAL MEDICINE

## 2021-11-12 PROCEDURE — 93321 DOPPLER ECHO F-UP/LMTD STD: CPT | Mod: 26 | Performed by: INTERNAL MEDICINE

## 2021-11-16 DIAGNOSIS — K21.9 GASTROESOPHAGEAL REFLUX DISEASE, UNSPECIFIED WHETHER ESOPHAGITIS PRESENT: ICD-10-CM

## 2021-11-16 RX ORDER — OMEPRAZOLE 40 MG/1
CAPSULE, DELAYED RELEASE ORAL
Qty: 90 CAPSULE | Refills: 0 | COMMUNITY
Start: 2021-11-16

## 2021-11-16 NOTE — TELEPHONE ENCOUNTER
Refused Prescriptions:                       Disp   Refills    omeprazole (PRILOSEC) 40 MG DR capsule [Ph*90 cap*0        Sig: TAKE 1 CAPSULE BY MOUTH EVERY DAY  Refused By: NAHOMY TINEO  Reason for Refusal: Medication has been discontinued      Pt takes esomeprazole. This was accidentally sent in Aug.

## 2021-12-02 ENCOUNTER — OFFICE VISIT (OUTPATIENT)
Dept: FAMILY MEDICINE | Facility: CLINIC | Age: 61
End: 2021-12-02

## 2021-12-02 VITALS
RESPIRATION RATE: 20 BRPM | DIASTOLIC BLOOD PRESSURE: 82 MMHG | WEIGHT: 136 LBS | BODY MASS INDEX: 23.22 KG/M2 | TEMPERATURE: 97.2 F | HEART RATE: 60 BPM | SYSTOLIC BLOOD PRESSURE: 140 MMHG | HEIGHT: 64 IN

## 2021-12-02 DIAGNOSIS — R13.10 DYSPHAGIA, UNSPECIFIED TYPE: ICD-10-CM

## 2021-12-02 DIAGNOSIS — J02.9 SORE THROAT: Primary | ICD-10-CM

## 2021-12-02 PROCEDURE — 99213 OFFICE O/P EST LOW 20 MIN: CPT | Performed by: FAMILY MEDICINE

## 2021-12-02 ASSESSMENT — MIFFLIN-ST. JEOR: SCORE: 1156.89

## 2021-12-02 NOTE — NURSING NOTE
Wen Sanchez is here for a sore side of her throat for the past few months. Thinks that there might be a sore in the back. Also changed her acid reflux medication

## 2021-12-02 NOTE — PROGRESS NOTES
SUBJECTIVE: Wen Sanchez is a 61 year old female who presents for sore throat on left side since 6/21.  Neg RST and covid then, referred to ENT when it did not resolve.  She saw Dr Pham 8/21- saw left cryptic tonsil and a few small superior stones-removed. Pt was sent for CT scan neck- was normal.  Pt then referred to [facility] to look into acid issues. - saw Dr Rene and had neg EGD 8/21 (result not in epic)- placed on Nexium, not better, then saw Dr Estrada- switched to rabeprozole BID-still not better. Pain constant, some days worse than other.  When pain bad left ear hurts. No fevers, Pt does still note food sometimes getting stuck when she swallows.  Pt has tried to avoid spicy foods but no improvement    Patient Active Problem List   Diagnosis     Esophageal reflux     Hypoglycemia     Undiagnosed cardiac murmurs     Calculus of kidney     Health Care Home     Encounter for counseling     ACP (advance care planning)     Symptomatic menopausal or female climacteric states     Past Medical History:   Diagnosis Date     Family history of diabetes mellitus     Mother and Father     Gastroesophageal reflux disease      Kidney stones      Family History   Problem Relation Age of Onset     Diabetes Mother      Diabetes Father      Cancer Sister         cervical     Heart Disease No family hx of      Lipids No family hx of      Breast Cancer No family hx of      Cancer - colorectal No family hx of      Colon Cancer No family hx of      Social History     Socioeconomic History     Marital status:      Spouse name: Not on file     Number of children: 3     Years of education: 12     Highest education level: Not on file   Occupational History     Occupation:      Employer: [employer]     Occupation: retired   Tobacco Use     Smoking status: Never Smoker     Smokeless tobacco: Never Used   Substance and Sexual Activity     Alcohol use: Yes     Alcohol/week: 3.3 standard drinks     Types: 4 Standard drinks  or equivalent per week     Drug use: No     Sexual activity: Yes     Partners: Male     Birth control/protection: Pill   Other Topics Concern      Service Not Asked     Blood Transfusions Not Asked     Caffeine Concern Not Asked     Occupational Exposure Not Asked     Hobby Hazards Not Asked     Sleep Concern Not Asked     Stress Concern Not Asked     Weight Concern Not Asked     Special Diet Not Asked     Back Care Not Asked     Exercise Yes     Bike Helmet No     Seat Belt Yes     Self-Exams Yes     Parent/sibling w/ CABG, MI or angioplasty before 65F 55M? Not Asked   Social History Narrative        Functional abiltity:      Hearing imparment:No      Acitvities of daily living:Normal      Risk of falls:No      Home safety of concern:No        Do you exercise?     Yes   Times/week: 2    History of abusive relationships in past:   No    History of abusive relationships currently:    No    Do you feel emotionally and physically safe in your environment?     Yes    Do you own a gun?  No      Is the gun kept in a safe place:   NOT APPLICABLE    Do you wear a seatbelt regularly?     Yes    Do you use sun screen?     Yes         Social Determinants of Health     Financial Resource Strain: Not on file   Food Insecurity: Not on file   Transportation Needs: Not on file   Physical Activity: Not on file   Stress: Not on file   Social Connections: Not on file   Intimate Partner Violence: Not on file   Housing Stability: Not on file     Past Surgical History:   Procedure Laterality Date     C MAMMOGRAM, SCREENING  2012     COLONOSCOPY  2010     COLONOSCOPY N/A 7/29/2019    Procedure: COLONOSCOPY;  Surgeon: Mike Hill MD;  Location:  GI     EYE EXAM ESTABLISHED PT  2013      UGI ENDOSCOPY DIAG W OR W/O BRUSH/WASH  2001    Dr. Machuca     LAPAROSCOPIC CHOLECYSTECTOMY N/A 9/18/2019    Procedure: CHOLECYSTECTOMY, LAPAROSCOPIC;  Surgeon: Elena Rico MD;  Location:  OR     TEST NOT FOUND  10/2001    Normal  "GBUS except for liver hemangioma     ZZC ENLARGE BREAST  9/01     ALPRAZolam (XANAX) 1 MG tablet, Take 1 tablet (1 mg) by mouth 3 times daily as needed for anxiety  EPI E-Z PEN QUIRINO 1:1000  IJ, 1 TIME ONLY  esomeprazole (NEXIUM) 40 MG DR capsule, Take 1 capsule (40 mg) by mouth every morning (before breakfast) Take 30-60 minutes before eating.  fexofenadine (ALLEGRA) 180 MG tablet, Take 180 mg by mouth daily    No current facility-administered medications on file prior to visit.       Allergies: Sesame oil, Cephalosporins, and Nickel    Immunization History   Administered Date(s) Administered     COVID-19,PF,Pfizer (12+ Yrs) 03/23/2021, 04/13/2021     Influenza (IIV3) PF 10/22/2009     Influenza Vaccine IM > 6 months Valent IIV4 (Alfuria,Fluzone) 11/18/2014, 11/05/2015     TD (ADULT, 7+) 01/01/1992, 06/11/2003, 05/10/2021     TDAP Vaccine (Boostrix) 05/18/2011     Td (Adult), Adsorbed 07/05/2001, 06/11/2003        OBJECTIVE: BP (!) 140/82 (BP Location: Left arm, Patient Position: Chair, Cuff Size: Adult Regular)   Pulse 60   Temp 97.2  F (36.2  C)   Resp 20   Ht 1.626 m (5' 4\")   Wt 61.7 kg (136 lb)   LMP 10/14/2013   BMI 23.34 kg/m     HEENT-pupils equal, round and reactive to light, extraocular movements intact, no conjunctival injection. TM's clear with normal appearing canals. Oropharynx in moist without mucosal lesion.  Maxillary and frontal sinus' non tender.   The neck is supple and free of adenopathy or masses, the thyroid is normal without enlargement or nodules.  S1 and S2 normal, no murmurs, clicks, gallops or rubs. Regular rate and rhythm. Chest is clear; no wheezes or rales. No edema or JVD.     ASSESSMENT: /PLAN:   (J02.9) Sore throat  (primary encounter diagnosis)  Comment: we had a long discussion about her chronic symptoms - reassured her that normal RST, EGD, CT , scope, ENT eval and GI eval no evidence anything serious.  Discussed she may need to live with minor discomfort for now.  We did " discuss possible gabapentin trial, also discussed trial selective serotonin reuptake inhibitor/SNRI  Plan: pt prefers to continue with rabeprazole for now, consider further GI work up if swallowing issue bothers her more    (R13.10) Dysphagia, unspecified type  Comment: mild symptoms , may need manometry or other GI studies if issues persist  Plan: as above    *22 minute visit*

## 2022-01-27 ENCOUNTER — HOSPITAL ENCOUNTER (OUTPATIENT)
Dept: MAMMOGRAPHY | Facility: CLINIC | Age: 62
Discharge: HOME | End: 2022-01-27
Attending: FAMILY MEDICINE | Admitting: FAMILY MEDICINE
Payer: COMMERCIAL

## 2022-01-27 DIAGNOSIS — Z12.31 VISIT FOR SCREENING MAMMOGRAM: ICD-10-CM

## 2022-01-27 PROCEDURE — 77067 SCR MAMMO BI INCL CAD: CPT

## 2022-01-28 ENCOUNTER — TRANSFERRED RECORDS (OUTPATIENT)
Dept: FAMILY MEDICINE | Facility: CLINIC | Age: 62
End: 2022-01-28

## 2022-06-01 ENCOUNTER — OFFICE VISIT (OUTPATIENT)
Dept: FAMILY MEDICINE | Facility: CLINIC | Age: 62
End: 2022-06-01

## 2022-06-01 VITALS
RESPIRATION RATE: 20 BRPM | TEMPERATURE: 97.3 F | WEIGHT: 135.6 LBS | HEIGHT: 64 IN | BODY MASS INDEX: 23.15 KG/M2 | DIASTOLIC BLOOD PRESSURE: 84 MMHG | SYSTOLIC BLOOD PRESSURE: 124 MMHG | HEART RATE: 76 BPM

## 2022-06-01 DIAGNOSIS — Z01.818 PREOPERATIVE EXAMINATION: Primary | ICD-10-CM

## 2022-06-01 DIAGNOSIS — K21.9 GASTROESOPHAGEAL REFLUX DISEASE, UNSPECIFIED WHETHER ESOPHAGITIS PRESENT: ICD-10-CM

## 2022-06-01 DIAGNOSIS — R07.0 THROAT PAIN IN ADULT: ICD-10-CM

## 2022-06-01 LAB
% GRANULOCYTES: 54.9 %
HCT VFR BLD AUTO: 40.2 % (ref 35–47)
HEMOGLOBIN: 13.3 G/DL (ref 11.7–15.7)
LYMPHOCYTES NFR BLD AUTO: 38.2 %
MCH RBC QN AUTO: 31 PG (ref 26–33)
MCHC RBC AUTO-ENTMCNC: 33.1 G/DL (ref 31–36)
MCV RBC AUTO: 93.8 FL (ref 78–100)
MONOCYTES NFR BLD AUTO: 6.9 %
PLATELET COUNT - QUEST: 228 10^9/L (ref 150–375)
RBC # BLD AUTO: 4.29 10*12/L (ref 3.8–5.2)
WBC # BLD AUTO: 6 10*9/L (ref 4–11)

## 2022-06-01 PROCEDURE — 36415 COLL VENOUS BLD VENIPUNCTURE: CPT | Performed by: FAMILY MEDICINE

## 2022-06-01 PROCEDURE — G2023 SPECIMEN COLLECT COVID-19: HCPCS | Performed by: FAMILY MEDICINE

## 2022-06-01 PROCEDURE — 85025 COMPLETE CBC W/AUTO DIFF WBC: CPT | Performed by: FAMILY MEDICINE

## 2022-06-01 PROCEDURE — 87635 SARS-COV-2 COVID-19 AMP PRB: CPT | Performed by: FAMILY MEDICINE

## 2022-06-01 PROCEDURE — 99214 OFFICE O/P EST MOD 30 MIN: CPT | Performed by: FAMILY MEDICINE

## 2022-06-01 RX ORDER — RABEPRAZOLE SODIUM 20 MG/1
20 TABLET, DELAYED RELEASE ORAL 2 TIMES DAILY
COMMUNITY
Start: 2022-04-28 | End: 2023-04-11

## 2022-06-01 RX ORDER — FEXOFENADINE HCL 180 MG/1
1 TABLET ORAL EVERY 24 HOURS
COMMUNITY
Start: 2021-08-19

## 2022-06-01 NOTE — PROGRESS NOTES
[facility]  [address] [place] [address]  [address]  [facility] [address]  Phone: [phone]  Fax: [phone]  Primary Provider: Davida Beltran  Pre-op Performing Provider: DAVIDA BELTRAN      PREOPERATIVE EVALUATION:  Today's date: 6/1/2022    Wen Sanchez is a 61 year old female who presents for a preoperative evaluation.    Surgical Information:  Surgery/Procedure: left tonsil removal  [facility]   Surgeon: Dr Pham  Surgery Date: 6/8/22  Time of Surgery: am  Where patient plans to recover: At home with family  Fax number for surgical facility: [phone]    Type of Anesthesia Anticipated: to be determined    Assessment & Plan     The proposed surgical procedure is considered INTERMEDIATE risk.    Preoperative examination      Throat pain in adult  Chronic left throat pain- unclear etiology but pt has decided she wants to remove tonsil to see if helps    Gastroesophageal reflux disease, unspecified whether esophagitis present  Well controlled          Risks and Recommendations:  The patient has the following additional risks and recommendations for perioperative complications:   - No identified additional risk factors other than previously addressed    Medication Instructions:  Patient is to take all scheduled medications on the day of surgery    RECOMMENDATION:  APPROVAL GIVEN to proceed with proposed procedure, without further diagnostic evaluation.    Review of external notes as documented above   Review of the result(s) of each unique test - labs                  Subjective     1. No - Have you ever had a heart attack or stroke?  2. No - Have you ever had surgery on your heart or blood vessels, such as a stent, coronary (heart) bypass, or surgery on an artery in the head, neck, heart, or legs?  3. No - Do you have chest pain when you are physically active?  4. No - Do you have a history of heart failure?  5. No - Do you currently  have a cold, bronchitis, or symptoms of other respiratory (head and chest) infections?  6. No - Do you have a cough, shortness of breath, or wheezing?  7. No - Do you or anyone in your family have a history of blood clots?  8. No - Do you or anyone in your family have a serious bleeding problem, such as long-lasting bleeding after surgeries or cuts?  9. Yes - Have you ever had anemia or been told to take iron pills? pregnancy only  10. No - Have you had any abnormal blood loss such as black, tarry or bloody stools, or abnormal vaginal bleeding?  11. No - Have you ever had a blood transfusion?  12. Yes - Are you willing to have a blood transfusion if it is medically needed before, during, or after your surgery?  13. No - Have you or anyone in your family ever had problems with anesthesia (sedation for surgery)?  14. No - Do you have sleep apnea, excessive snoring, or daytime drowsiness?   15. No - Do you have any artifical heart valves or other implanted medical devices, such as a pacemaker, defibrillator, or continuous glucose monitor?  16. No - Do you have any artifical joints?  17. No - Are you allergic to latex?  18. No - Is there any chance that you may be pregnant?      Health Care Directive:  Patient does not have a Health Care Directive or Living Will: Discussed advance care planning with patient; information given to patient to review.    Preoperative Review of :   reviewed - no record of controlled substances prescribed.      Status of Chronic Conditions:  See problem list for active medical problems.  Problems all longstanding and stable, except as noted/documented.  See ROS for pertinent symptoms related to these conditions.      Review of Systems  CONSTITUTIONAL: NEGATIVE for fever, chills, change in weight  ENT/MOUTH: NEGATIVE for ear, mouth and throat problems  RESP: NEGATIVE for significant cough or SOB  CV: NEGATIVE for chest pain, palpitations or peripheral edema    Patient Active Problem List     Diagnosis Date Noted     ACP (advance care planning) 03/14/2014     Priority: Medium                        Symptomatic menopausal or female climacteric states 03/14/2014     Priority: Medium     Encounter for counseling 08/29/2013     Priority: Medium     Discussed advance care planning with patient; however, patient declined at this time.    Problem list name updated by automated process. Provider to review       Health Care Home 10/01/2012     Priority: Medium     State Tier Level:  Tier 1  Status:  n/a  Care Coordinator:   See Letters for [facility] Plan             Calculus of kidney 03/23/2006     Priority: Medium     Undiagnosed cardiac murmurs 02/27/2003     Priority: Medium     Hypoglycemia 09/09/2002     Priority: Medium     Problem list name updated by automated process. Provider to review       Esophageal reflux 04/25/2002     Priority: Medium      Past Medical History:   Diagnosis Date     Family history of diabetes mellitus     Mother and Father     Gastroesophageal reflux disease      Kidney stones      Past Surgical History:   Procedure Laterality Date     C MAMMOGRAM, SCREENING  2012     COLONOSCOPY  2010     COLONOSCOPY N/A 7/29/2019    Procedure: COLONOSCOPY;  Surgeon: Mike Hill MD;  Location:  GI     EYE EXAM ESTABLISHED PT  2013      UGI ENDOSCOPY DIAG W OR W/O BRUSH/WASH  2001    Dr. Machuca     LAPAROSCOPIC CHOLECYSTECTOMY N/A 9/18/2019    Procedure: CHOLECYSTECTOMY, LAPAROSCOPIC;  Surgeon: Elena Rico MD;  Location:  OR     TEST NOT FOUND  10/2001    Normal GBUS except for liver hemangioma     ZZC ENLARGE BREAST  9/01     Current Outpatient Medications   Medication Sig Dispense Refill     amitriptyline (ELAVIL) 25 MG tablet Take 25 mg by mouth At Bedtime       EPI E-Z PEN QUIRINO 1:1000  IJ 1 TIME ONLY 1 2     fexofenadine (ALLEGRA) 180 MG tablet Take 1 tablet by mouth every 24 hours       RABEprazole (ACIPHEX) 20 MG EC tablet Take 20 mg by mouth 2 times daily    "      Allergies   Allergen Reactions     Sesame Oil Anaphylaxis     Cephalosporins Rash     Reaction was during a surgery, MD thinks the reaction was to the cephalosporin rather than an anesthetic agent  Reaction was severe rash and itching     Nickel Rash        Social History     Tobacco Use     Smoking status: Never Smoker     Smokeless tobacco: Never Used   Substance Use Topics     Alcohol use: Yes     Alcohol/week: 3.3 standard drinks     Types: 4 Standard drinks or equivalent per week     Family History   Problem Relation Age of Onset     Diabetes Mother      Diabetes Father      Cancer Sister         cervical     Heart Disease No family hx of      Lipids No family hx of      Breast Cancer No family hx of      Cancer - colorectal No family hx of      Colon Cancer No family hx of      History   Drug Use No         Objective     /84 (BP Location: Left arm, Patient Position: Chair, Cuff Size: Adult Regular)   Pulse 76   Temp 97.3  F (36.3  C)   Resp 20   Ht 1.626 m (5' 4\")   Wt 61.5 kg (135 lb 9.6 oz)   LMP 10/14/2013   BMI 23.28 kg/m          Physical Exam  GENERAL APPEARANCE: healthy, alert and no distress  HENT: ear canals and TM's normal and nose and mouth without ulcers or lesions  RESP: lungs clear to auscultation - no rales, rhonchi or wheezes  CV: regular rate and rhythm, normal S1 S2, no S3 or S4 and no murmur, click or rub   ABDOMEN: soft, nontender, no HSM or masses and bowel sounds normal  NEURO: Normal strength and tone, sensory exam grossly normal, mentation intact and speech normal    Recent Labs   Lab Test 10/23/21  0141 08/05/21  1440 07/27/21  0000 05/10/21  1412   HGB 15.0 14.8  --   --     181  --   --      --  141.1  --    POTASSIUM 3.9  --  4.30  --    CR 0.74  --  0.77  --    A1C  --   --   --  5.1        Diagnostics:  Recent Results (from the past 24 hour(s))   HEMOGRAM PLATELET DIFF (BFP)    Collection Time: 06/01/22  1:32 PM   Result Value Ref Range    WBC 6.0 " 4.0 - 11 10*9/L    RBC Count 4.29 3.8 - 5.2 10*12/L    Hemoglobin 13.3 11.7 - 15.7 g/dL    Hematocrit 40.2 35.0 - 47.0 %    MCV 93.8 78 - 100 fL    MCH 31.0 26 - 33 pg    MCHC 33.1 31 - 36 g/dL    Platelet Count 228 150 - 375 10^9/L    % Granulocytes 54.9 %    % Lymphocytes 38.2 %    % Monocytes 6.9 %      No EKG required, no history of coronary heart disease, significant arrhythmia, peripheral arterial disease or other structural heart disease.     Pt had neg stress echo 11/21-reviewed    Revised Cardiac Risk Index (RCRI):  The patient has the following serious cardiovascular risks for perioperative complications:   - No serious cardiac risks = 0 points     RCRI Interpretation: 0 points: Class I (very low risk - 0.4% complication rate)           Signed Electronically by: Mike Beltran MD  Copy of this evaluation report is provided to requesting physician.

## 2022-06-01 NOTE — NURSING NOTE
Wen Sanchez is here for a pre-op exam.    Questioned patient about current smoking habits.  Pt. has never smoked.  PULSE regular  My Chart: active  CLASSIFICATION OF OVERWEIGHT AND OBESITY BY BMI                        Obesity Class           BMI(kg/m2)  Underweight                                    < 18.5  Normal                                         18.5-24.9  Overweight                                     25.0-29.9  OBESITY                     I                  30.0-34.9                             II                 35.0-39.9  EXTREME OBESITY             III                >40                            Patient's  BMI Body mass index is 23.28 kg/m .  http://hin.nhlbi.nih.gov/menuplanner/menu.cgi  Pre-visit planning  Immunizations - up to date  Colonoscopy -   Mammogram -   Asthma -   PHQ9 -    CLAUDIA-7 -

## 2022-06-01 NOTE — LETTER
[facility]  [address] [place] [address]  [address]  [facility] [address]  Phone: [phone]  Fax: [phone]  Primary Provider: Davida Beltran  Pre-op Performing Provider: DAVIDA BELTRAN      PREOPERATIVE EVALUATION:  Today's date: 6/1/2022    Wen Sanchez is a 61 year old female who presents for a preoperative evaluation.    Surgical Information:  Surgery/Procedure: left tonsil removal  Surgery Location: [facility]   Surgeon: Dr Pham  Surgery Date: 6/8/22  Time of Surgery: am  Where patient plans to recover: At home with family  Fax number for surgical facility: [phone]    Type of Anesthesia Anticipated: to be determined    Assessment & Plan     The proposed surgical procedure is considered INTERMEDIATE risk.    Preoperative examination      Throat pain in adult  Chronic left throat pain- unclear etiology but pt has decided she wants to remove tonsil to see if helps    Gastroesophageal reflux disease, unspecified whether esophagitis present  Well controlled          Risks and Recommendations:  The patient has the following additional risks and recommendations for perioperative complications:   - No identified additional risk factors other than previously addressed    Medication Instructions:  Patient is to take all scheduled medications on the day of surgery    RECOMMENDATION:  APPROVAL GIVEN to proceed with proposed procedure, without further diagnostic evaluation.    Review of external notes as documented above   Review of the result(s) of each unique test - labs                  Subjective     1. No - Have you ever had a heart attack or stroke?  2. No - Have you ever had surgery on your heart or blood vessels, such as a stent, coronary (heart) bypass, or surgery on an artery in the head, neck, heart, or legs?  3. No - Do you have chest pain when you are physically active?  4. No - Do you have a history of heart failure?  5. No - Do you currently  have a cold, bronchitis, or symptoms of other respiratory (head and chest) infections?  6. No - Do you have a cough, shortness of breath, or wheezing?  7. No - Do you or anyone in your family have a history of blood clots?  8. No - Do you or anyone in your family have a serious bleeding problem, such as long-lasting bleeding after surgeries or cuts?  9. Yes - Have you ever had anemia or been told to take iron pills? pregnancy only  10. No - Have you had any abnormal blood loss such as black, tarry or bloody stools, or abnormal vaginal bleeding?  11. No - Have you ever had a blood transfusion?  12. Yes - Are you willing to have a blood transfusion if it is medically needed before, during, or after your surgery?  13. No - Have you or anyone in your family ever had problems with anesthesia (sedation for surgery)?  14. No - Do you have sleep apnea, excessive snoring, or daytime drowsiness?   15. No - Do you have any artifical heart valves or other implanted medical devices, such as a pacemaker, defibrillator, or continuous glucose monitor?  16. No - Do you have any artifical joints?  17. No - Are you allergic to latex?  18. No - Is there any chance that you may be pregnant?      Health Care Directive:  Patient does not have a Health Care Directive or Living Will: Discussed advance care planning with patient; information given to patient to review.    Preoperative Review of :   reviewed - no record of controlled substances prescribed.      Status of Chronic Conditions:  See problem list for active medical problems.  Problems all longstanding and stable, except as noted/documented.  See ROS for pertinent symptoms related to these conditions.      Review of Systems  CONSTITUTIONAL: NEGATIVE for fever, chills, change in weight  ENT/MOUTH: NEGATIVE for ear, mouth and throat problems  RESP: NEGATIVE for significant cough or SOB  CV: NEGATIVE for chest pain, palpitations or peripheral edema    Patient Active Problem List     Diagnosis Date Noted     ACP (advance care planning) 03/14/2014     Priority: Medium                        Symptomatic menopausal or female climacteric states 03/14/2014     Priority: Medium     Encounter for counseling 08/29/2013     Priority: Medium     Discussed advance care planning with patient; however, patient declined at this time.    Problem list name updated by automated process. Provider to review       Health Care Home 10/01/2012     Priority: Medium     State Tier Level:  Tier 1  Status:  n/a  Care Coordinator:   See Letters for [facility] Care Plan             Calculus of kidney 03/23/2006     Priority: Medium     Undiagnosed cardiac murmurs 02/27/2003     Priority: Medium     Hypoglycemia 09/09/2002     Priority: Medium     Problem list name updated by automated process. Provider to review       Esophageal reflux 04/25/2002     Priority: Medium      Past Medical History:   Diagnosis Date     Family history of diabetes mellitus     Mother and Father     Gastroesophageal reflux disease      Kidney stones      Past Surgical History:   Procedure Laterality Date     C MAMMOGRAM, SCREENING  2012     COLONOSCOPY  2010     COLONOSCOPY N/A 7/29/2019    Procedure: COLONOSCOPY;  Surgeon: Mike Hill MD;  Location:  GI     EYE EXAM ESTABLISHED PT  2013      UGI ENDOSCOPY DIAG W OR W/O BRUSH/WASH  2001    Dr. Machuca     LAPAROSCOPIC CHOLECYSTECTOMY N/A 9/18/2019    Procedure: CHOLECYSTECTOMY, LAPAROSCOPIC;  Surgeon: Elena Rico MD;  Location:  OR     TEST NOT FOUND  10/2001    Normal GBUS except for liver hemangioma     ZZC ENLARGE BREAST  9/01     Current Outpatient Medications   Medication Sig Dispense Refill     amitriptyline (ELAVIL) 25 MG tablet Take 25 mg by mouth At Bedtime       EPI E-Z PEN QUIRINO 1:1000  IJ 1 TIME ONLY 1 2     fexofenadine (ALLEGRA) 180 MG tablet Take 1 tablet by mouth every 24 hours       RABEprazole (ACIPHEX) 20 MG EC tablet Take 20 mg by mouth 2 times daily    "      Allergies   Allergen Reactions     Sesame Oil Anaphylaxis     Cephalosporins Rash     Reaction was during a surgery, MD thinks the reaction was to the cephalosporin rather than an anesthetic agent  Reaction was severe rash and itching     Nickel Rash        Social History     Tobacco Use     Smoking status: Never Smoker     Smokeless tobacco: Never Used   Substance Use Topics     Alcohol use: Yes     Alcohol/week: 3.3 standard drinks     Types: 4 Standard drinks or equivalent per week     Family History   Problem Relation Age of Onset     Diabetes Mother      Diabetes Father      Cancer Sister         cervical     Heart Disease No family hx of      Lipids No family hx of      Breast Cancer No family hx of      Cancer - colorectal No family hx of      Colon Cancer No family hx of      History   Drug Use No         Objective     /84 (BP Location: Left arm, Patient Position: Chair, Cuff Size: Adult Regular)   Pulse 76   Temp 97.3  F (36.3  C)   Resp 20   Ht 1.626 m (5' 4\")   Wt 61.5 kg (135 lb 9.6 oz)   LMP 10/14/2013   BMI 23.28 kg/m          Physical Exam  GENERAL APPEARANCE: healthy, alert and no distress  HENT: ear canals and TM's normal and nose and mouth without ulcers or lesions  RESP: lungs clear to auscultation - no rales, rhonchi or wheezes  CV: regular rate and rhythm, normal S1 S2, no S3 or S4 and no murmur, click or rub   ABDOMEN: soft, nontender, no HSM or masses and bowel sounds normal  NEURO: Normal strength and tone, sensory exam grossly normal, mentation intact and speech normal    Recent Labs   Lab Test 10/23/21  0141 08/05/21  1440 07/27/21  0000 05/10/21  1412   HGB 15.0 14.8  --   --     181  --   --      --  141.1  --    POTASSIUM 3.9  --  4.30  --    CR 0.74  --  0.77  --    A1C  --   --   --  5.1        Diagnostics:  Recent Results (from the past 24 hour(s))   HEMOGRAM PLATELET DIFF (BFP)    Collection Time: 06/01/22  1:32 PM   Result Value Ref Range    WBC 6.0 " 4.0 - 11 10*9/L    RBC Count 4.29 3.8 - 5.2 10*12/L    Hemoglobin 13.3 11.7 - 15.7 g/dL    Hematocrit 40.2 35.0 - 47.0 %    MCV 93.8 78 - 100 fL    MCH 31.0 26 - 33 pg    MCHC 33.1 31 - 36 g/dL    Platelet Count 228 150 - 375 10^9/L    % Granulocytes 54.9 %    % Lymphocytes 38.2 %    % Monocytes 6.9 %      No EKG required, no history of coronary heart disease, significant arrhythmia, peripheral arterial disease or other structural heart disease.     Pt had neg stress echo 11/21-reviewed    Revised Cardiac Risk Index (RCRI):  The patient has the following serious cardiovascular risks for perioperative complications:   - No serious cardiac risks = 0 points     RCRI Interpretation: 0 points: Class I (very low risk - 0.4% complication rate)           Signed Electronically by: Mike Beltran MD  Copy of this evaluation report is provided to requesting physician.

## 2022-06-02 LAB — SARS-COV-2 RNA SPEC QL NAA+PROBE: NOT DETECTED

## 2022-06-08 ENCOUNTER — LAB REQUISITION (OUTPATIENT)
Dept: LAB | Facility: CLINIC | Age: 62
End: 2022-06-08
Payer: COMMERCIAL

## 2022-06-08 PROCEDURE — 88304 TISSUE EXAM BY PATHOLOGIST: CPT | Mod: TC,ORL | Performed by: OTOLARYNGOLOGY

## 2022-06-08 PROCEDURE — 88304 TISSUE EXAM BY PATHOLOGIST: CPT | Mod: 26 | Performed by: PATHOLOGY

## 2022-06-09 LAB
PATH REPORT.COMMENTS IMP SPEC: NORMAL
PATH REPORT.COMMENTS IMP SPEC: NORMAL
PATH REPORT.FINAL DX SPEC: NORMAL
PATH REPORT.GROSS SPEC: NORMAL
PATH REPORT.MICROSCOPIC SPEC OTHER STN: NORMAL
PATH REPORT.RELEVANT HX SPEC: NORMAL
PHOTO IMAGE: NORMAL

## 2022-06-30 ENCOUNTER — TRANSFERRED RECORDS (OUTPATIENT)
Dept: FAMILY MEDICINE | Facility: CLINIC | Age: 62
End: 2022-06-30

## 2022-10-09 ENCOUNTER — HEALTH MAINTENANCE LETTER (OUTPATIENT)
Age: 62
End: 2022-10-09

## 2023-02-20 ENCOUNTER — HOSPITAL ENCOUNTER (OUTPATIENT)
Dept: MAMMOGRAPHY | Facility: CLINIC | Age: 63
Discharge: HOME | End: 2023-02-20
Attending: FAMILY MEDICINE | Admitting: FAMILY MEDICINE
Payer: COMMERCIAL

## 2023-02-20 DIAGNOSIS — Z12.31 VISIT FOR SCREENING MAMMOGRAM: ICD-10-CM

## 2023-02-20 PROCEDURE — 77063 BREAST TOMOSYNTHESIS BI: CPT

## 2023-02-27 ENCOUNTER — TRANSFERRED RECORDS (OUTPATIENT)
Dept: FAMILY MEDICINE | Facility: CLINIC | Age: 63
End: 2023-02-27

## 2023-04-11 ENCOUNTER — OFFICE VISIT (OUTPATIENT)
Dept: FAMILY MEDICINE | Facility: CLINIC | Age: 63
End: 2023-04-11

## 2023-04-11 VITALS
WEIGHT: 140.6 LBS | BODY MASS INDEX: 24.01 KG/M2 | DIASTOLIC BLOOD PRESSURE: 64 MMHG | HEIGHT: 64 IN | RESPIRATION RATE: 20 BRPM | TEMPERATURE: 98.2 F | SYSTOLIC BLOOD PRESSURE: 108 MMHG | HEART RATE: 64 BPM

## 2023-04-11 DIAGNOSIS — Z00.00 ROUTINE GENERAL MEDICAL EXAMINATION AT A HEALTH CARE FACILITY: Primary | ICD-10-CM

## 2023-04-11 DIAGNOSIS — E78.2 MIXED HYPERLIPIDEMIA: ICD-10-CM

## 2023-04-11 DIAGNOSIS — Z91.018 FOOD ALLERGY: ICD-10-CM

## 2023-04-11 DIAGNOSIS — K21.9 GASTROESOPHAGEAL REFLUX DISEASE WITHOUT ESOPHAGITIS: ICD-10-CM

## 2023-04-11 LAB
ALBUMIN SERPL-MCNC: 4.5 G/DL (ref 3.6–5.1)
ALBUMIN/GLOB SERPL: 1.6 {RATIO} (ref 1–2.5)
ALP SERPL-CCNC: 64 U/L (ref 33–130)
ALT 1742-6: 12 U/L (ref 0–32)
AST 1920-8: 14 U/L (ref 0–35)
BILIRUB SERPL-MCNC: 0.5 MG/DL (ref 0.2–1.2)
BUN SERPL-MCNC: 22 MG/DL (ref 7–25)
BUN/CREATININE RATIO: 27.2
CALCIUM SERPL-MCNC: 9.8 MG/DL (ref 8.6–10.3)
CHLORIDE SERPLBLD-SCNC: 106.7 MMOL/L (ref 98–110)
CHOLEST SERPL-MCNC: 276 MG/DL (ref 0–199)
CHOLEST/HDLC SERPL: 3 {RATIO} (ref 0–5)
CO2 SERPL-SCNC: 26.7 MMOL/L (ref 20–32)
CREAT SERPL-MCNC: 0.81 MG/DL (ref 0.6–1.3)
GLOBULIN, CALCULATED - QUEST: 2.8 (ref 1.9–3.7)
GLUCOSE SERPL-MCNC: 88 MG/DL (ref 60–99)
HDLC SERPL-MCNC: 94 MG/DL (ref 40–150)
LDLC SERPL CALC-MCNC: 168 MG/DL (ref 0–130)
POTASSIUM SERPL-SCNC: 4.53 MMOL/L (ref 3.5–5.3)
PROT SERPL-MCNC: 7.3 G/DL (ref 6.1–8.1)
SODIUM SERPL-SCNC: 140.8 MMOL/L (ref 135–146)
TRIGL SERPL-MCNC: 72 MG/DL (ref 0–149)

## 2023-04-11 PROCEDURE — 36415 COLL VENOUS BLD VENIPUNCTURE: CPT | Performed by: FAMILY MEDICINE

## 2023-04-11 PROCEDURE — 80061 LIPID PANEL: CPT | Performed by: FAMILY MEDICINE

## 2023-04-11 PROCEDURE — 99396 PREV VISIT EST AGE 40-64: CPT | Performed by: FAMILY MEDICINE

## 2023-04-11 PROCEDURE — 80053 COMPREHEN METABOLIC PANEL: CPT | Performed by: FAMILY MEDICINE

## 2023-04-11 RX ORDER — RABEPRAZOLE SODIUM 20 MG/1
20 TABLET, DELAYED RELEASE ORAL 2 TIMES DAILY
COMMUNITY
Start: 2023-04-11 | End: 2023-09-18

## 2023-04-11 RX ORDER — EPINEPHRINE 0.3 MG/.3ML
0.3 INJECTION SUBCUTANEOUS PRN
Qty: 2 EACH | Refills: 1 | Status: SHIPPED | OUTPATIENT
Start: 2023-04-11

## 2023-04-11 NOTE — NURSING NOTE
Wen Sanchez is here for a CPX.    Pre-visit planning  Immunizations -up to date  Colonoscopy -is up to date  Mammogram -is up to date  Asthma test --  PHQ9 -  CLAUDIA 7 -      Questioned patient about current smoking habits.  Pt. has never smoked.  Body mass index is 24.13 kg/m .  PULSE regular  My Chart: active  CLASSIFICATION OF OVERWEIGHT AND OBESITY BY BMI                        Obesity Class           BMI(kg/m2)  Underweight                                    < 18.5  Normal                                         18.5-24.9  Overweight                                     25.0-29.9  OBESITY                     I                  30.0-34.9                             II                 35.0-39.9  EXTREME OBESITY             III                >40                            Patient's  BMI Body mass index is 24.13 kg/m .      The patient has verbalized that it is ok to leave a detailed voice message on the patient's cell phone with results/recommendations from this visit.

## 2023-04-11 NOTE — PROGRESS NOTES
SUBJECTIVE:   CC: Wen Sanchez is an 62 year old woman who presents for preventive health visit.       Patient has been advised of split billing requirements and indicates understanding: Yes  Healthy Habits:    Do you get at least three servings of calcium containing foods daily (dairy, green leafy vegetables, etc.)? yes    Amount of exercise or daily activities, outside of work: 7 day(s) per week    Problems taking medications regularly No    Medication side effects: No    Have you had an eye exam in the past two years? yes    Do you see a dentist twice per year? yes    Do you have sleep apnea, excessive snoring or daytime drowsiness?no          Today's PHQ-2 Score:     4/11/2023     8:38 AM 6/1/2022     1:45 PM   PHQ-2 ( 1999 [employer])   Q1: Little interest or pleasure in doing things 0 0   Q2: Feeling down, depressed or hopeless 0 0   PHQ-2 Score 0 0       Abuse: Current or Past(Physical, Sexual or Emotional)- No  Do you feel safe in your environment? Yes        Social History     Tobacco Use     Smoking status: Never     Smokeless tobacco: Never   Vaping Use     Vaping status: Not on file   Substance Use Topics     Alcohol use: Yes     Alcohol/week: 3.3 standard drinks of alcohol     Types: 4 Standard drinks or equivalent per week     If you drink alcohol do you typically have >3 drinks per day or >7 drinks per week? No                     Reviewed orders with patient.  Reviewed health maintenance and updated orders accordingly - Yes  BP Readings from Last 3 Encounters:   04/11/23 108/64   06/01/22 124/84   12/02/21 (!) 140/82    Wt Readings from Last 3 Encounters:   04/11/23 63.8 kg (140 lb 9.6 oz)   06/01/22 61.5 kg (135 lb 9.6 oz)   12/02/21 61.7 kg (136 lb)                  Patient Active Problem List   Diagnosis     Esophageal reflux     Hypoglycemia     Undiagnosed cardiac murmurs     Calculus of kidney     Health Care Home     Encounter for counseling     ACP (advance care planning)     Symptomatic  menopausal or female climacteric states     Past Surgical History:   Procedure Laterality Date     C MAMMOGRAM, SCREENING  2012     COLONOSCOPY  2010     COLONOSCOPY N/A 7/29/2019    Procedure: COLONOSCOPY;  Surgeon: Mike Hill MD;  Location:  GI     EYE EXAM ESTABLISHED PT  2013      UGI ENDOSCOPY DIAG W OR W/O BRUSH/WASH  2001    Dr. Machuca     LAPAROSCOPIC CHOLECYSTECTOMY N/A 9/18/2019    Procedure: CHOLECYSTECTOMY, LAPAROSCOPIC;  Surgeon: Elena Rico MD;  Location: [facility] OR     TEST NOT FOUND  10/2001    Normal GBUS except for liver hemangioma     ZZC ENLARGE BREAST  9/01       Social History     Tobacco Use     Smoking status: Never     Smokeless tobacco: Never   Vaping Use     Vaping status: Not on file   Substance Use Topics     Alcohol use: Yes     Alcohol/week: 3.3 standard drinks of alcohol     Types: 4 Standard drinks or equivalent per week     Family History   Problem Relation Age of Onset     Diabetes Mother      Diabetes Father      Cancer Sister         cervical     Heart Disease No family hx of      Lipids No family hx of      Breast Cancer No family hx of      Cancer - colorectal No family hx of      Colon Cancer No family hx of          Current Outpatient Medications   Medication Sig Dispense Refill     EPI E-Z PEN QUIRINO 1:1000  IJ 1 TIME ONLY 1 2     fexofenadine (ALLEGRA) 180 MG tablet Take 1 tablet by mouth every 24 hours       RABEprazole (ACIPHEX) 20 MG EC tablet Take 1 tablet (20 mg) by mouth 2 times daily       Allergies   Allergen Reactions     Sesame Oil Anaphylaxis     Cephalosporins Rash     Reaction was during a surgery, MD thinks the reaction was to the cephalosporin rather than an anesthetic agent  Reaction was severe rash and itching     Nickel Rash     Recent Labs   Lab Test 10/23/21  0141 07/27/21  0000 05/10/21  1412 01/10/21  1016 09/28/20  1512 06/03/20  0000 11/15/18  0840 10/21/18  0850 08/16/17  1902 02/25/17  1520 02/14/17  1012   A1C  --   --  5.1  --   --    --   --   --   --   --   --    LDL  --  150*  --   --   --  131* 120*  --   --   --  127   HDL  --  89  --   --   --  94 98  --   --   --  90   TRIG  --  94  --   --   --  66 56  --   --   --  62   ALT  --   --   --   --   --   --   --  29 18  --  17   CR 0.74 0.77  --  0.61  --  0.75  --  0.74 0.92   < > 0.72   GFRESTIMATED 86  --   --  >90  --   --   --  80 69   < > 92   GFRESTBLACK  --   --   --  >90  --   --   --  >90  --    < >  --    POTASSIUM 3.9 4.30  --  3.6  --  4.04  --  3.4 3.9   < > 4.0   TSH  --   --   --   --  1.83  --   --   --   --   --   --     < > = values in this interval not displayed.        FHS-7:       1/27/2022     9:35 AM 2/20/2023     2:26 PM   Breast CA Risk Assessment (FHS-7)   Did any of your first-degree relatives have breast or ovarian cancer? No No   Did any of your relatives have bilateral breast cancer? No No   Did any man in your family have breast cancer? No No   Did any woman in your family have breast and ovarian cancer? No No   Did any woman in your family have breast cancer before age 50 y? No No   Do you have 2 or more relatives with breast and/or ovarian cancer? No No   Do you have 2 or more relatives with breast and/or bowel cancer? No No       Mammogram Screening: Recommended mammography every 1-2 years with patient discussion and risk factor consideration  Pertinent mammograms are reviewed under the imaging tab.    Pertinent mammograms are reviewed under the imaging tab.  History of abnormal Pap smear: NO - age 30- 65 PAP every 3 years recommended     Reviewed and updated as needed this visit by clinical staff   Tobacco   Meds  Problems  Med Hx  Surg Hx           Reviewed and updated as needed this visit by Provider                 Past Medical History:   Diagnosis Date     Family history of diabetes mellitus     Mother and Father     Gastroesophageal reflux disease      Kidney stones       Past Surgical History:   Procedure Laterality Date     C MAMMOGRAM,  "SCREENING  2012     COLONOSCOPY  2010     COLONOSCOPY N/A 7/29/2019    Procedure: COLONOSCOPY;  Surgeon: Mike Hill MD;  Location:  GI     EYE EXAM ESTABLISHED PT  2013      UGI ENDOSCOPY DIAG W OR W/O BRUSH/WASH  2001    Dr. Machuca     LAPAROSCOPIC CHOLECYSTECTOMY N/A 9/18/2019    Procedure: CHOLECYSTECTOMY, LAPAROSCOPIC;  Surgeon: Elena Rico MD;  Location: [facility] OR     TEST NOT FOUND  10/2001    Normal GBUS except for liver hemangioma     ZZC ENLARGE BREAST  9/01       ROS:  CONSTITUTIONAL: NEGATIVE for fever, chills, change in weight  INTEGUMENTARY/SKIN: NEGATIVE for worrisome rashes, moles or lesions  EYES: NEGATIVE for vision changes or irritation  ENT: NEGATIVE for ear, mouth and throat problems  RESP: NEGATIVE for significant cough or SOB  BREAST: NEGATIVE for masses, tenderness or discharge  CV: NEGATIVE for chest pain, palpitations or peripheral edema  GI: NEGATIVE for nausea, abdominal pain, heartburn, or change in bowel habits  : NEGATIVE for unusual urinary or vaginal symptoms. No vaginal bleeding.  MUSCULOSKELETAL: NEGATIVE for significant arthralgias or myalgia  NEURO: NEGATIVE for weakness, dizziness or paresthesias  ENDOCRINE: NEGATIVE for temperature intolerance, skin/hair changes  PSYCHIATRIC: NEGATIVE for changes in mood or affect     OBJECTIVE:   /64 (BP Location: Right arm, Patient Position: Chair, Cuff Size: Adult Regular)   Pulse 64   Temp 98.2  F (36.8  C) (Temporal)   Resp 20   Ht 1.626 m (5' 4\")   Wt 63.8 kg (140 lb 9.6 oz)   LMP 10/14/2013   BMI 24.13 kg/m    EXAM:  GENERAL: healthy, alert and no distress  EYES: Eyes grossly normal to inspection, PERRL and conjunctivae and sclerae normal  HENT: ear canals and TM's normal, nose and mouth without ulcers or lesions  NECK: no adenopathy, no asymmetry, masses, or scars and thyroid normal to palpation  RESP: lungs clear to auscultation - no rales, rhonchi or wheezes  CV: regular rate and rhythm, normal S1 S2, no " "S3 or S4, no murmur, click or rub, no peripheral edema and peripheral pulses strong  ABDOMEN: soft, nontender, no hepatosplenomegaly, no masses and bowel sounds normal  MS: no gross musculoskeletal defects noted, no edema  SKIN: no suspicious lesions or rashes  NEURO: Normal strength and tone, mentation intact and speech normal  PSYCH: mentation appears normal, affect normal/bright    Diagnostic Test Results:  Labs reviewed in Epic    ASSESSMENT/PLAN:   (Z00.00) Routine general medical examination at a health care facility  (primary encounter diagnosis)  Comment: discussed preventitive healthcare   Plan: Continue to work on healthy diet and exercise, discussed healthy habits     (E78.2) Mixed hyperlipidemia  Comment: control uncertain  Plan: Continue to work on healthy diet and exercise, discussed healthy habits     (K21.9) Gastroesophageal reflux disease without esophagitis  Comment: well controlled  Plan: continue current medications at current doses     (Z91.018) Food allergy  Comment:   Plan:     Patient has been advised of split billing requirements and indicates understanding: Yes  COUNSELING:   Reviewed preventive health counseling, as reflected in patient instructions       Regular exercise       Healthy diet/nutrition       Vision screening       Colorectal Cancer Screening    Estimated body mass index is 24.13 kg/m  as calculated from the following:    Height as of this encounter: 1.626 m (5' 4\").    Weight as of this encounter: 63.8 kg (140 lb 9.6 oz).        She reports that she has never smoked. She has never used smokeless tobacco.      Counseling Resources:  ATP IV Guidelines  Pooled Cohorts Equation Calculator  Breast Cancer Risk Calculator  BRCA-Related Cancer Risk Assessment: FHS-7 Tool  FRAX Risk Assessment  ICSI Preventive Guidelines  Dietary Guidelines for Americans, 2010  USDA's MyPlate  ASA Prophylaxis  Lung CA Screening    Mike Beltran MD  [facility]  "

## 2023-09-18 ENCOUNTER — OFFICE VISIT (OUTPATIENT)
Dept: FAMILY MEDICINE | Facility: CLINIC | Age: 63
End: 2023-09-18

## 2023-09-18 VITALS
TEMPERATURE: 97.5 F | HEIGHT: 64 IN | DIASTOLIC BLOOD PRESSURE: 76 MMHG | BODY MASS INDEX: 23.56 KG/M2 | RESPIRATION RATE: 20 BRPM | HEART RATE: 68 BPM | SYSTOLIC BLOOD PRESSURE: 110 MMHG | WEIGHT: 138 LBS

## 2023-09-18 DIAGNOSIS — M54.9 UPPER BACK PAIN ON LEFT SIDE: Primary | ICD-10-CM

## 2023-09-18 PROCEDURE — 93000 ELECTROCARDIOGRAM COMPLETE: CPT | Performed by: FAMILY MEDICINE

## 2023-09-18 PROCEDURE — 99213 OFFICE O/P EST LOW 20 MIN: CPT | Performed by: FAMILY MEDICINE

## 2023-09-18 RX ORDER — RABEPRAZOLE SODIUM 20 MG/1
20 TABLET, DELAYED RELEASE ORAL DAILY
COMMUNITY
Start: 2023-09-18 | End: 2024-09-19

## 2023-09-18 NOTE — NURSING NOTE
Wen Sanchez is here for back spasm. Can feel it in her left shoulder, up to her neck and around her chest. Sometimes into her left lower arm    Questioned patient about current smoking habits.  Pt. has never smoked.  PULSE regular  My Chart: active  CLASSIFICATION OF OVERWEIGHT AND OBESITY BY BMI                        Obesity Class           BMI(kg/m2)  Underweight                                    < 18.5  Normal                                         18.5-24.9  Overweight                                     25.0-29.9  OBESITY                     I                  30.0-34.9                             II                 35.0-39.9  EXTREME OBESITY             III                >40                            Patient's  BMI Body mass index is 23.69 kg/m .  http://hin.nhlbi.nih.gov/menuplanner/menu.cgi  Pre-visit planning  Immunizations - up to date  Colonoscopy - is up to date  Mammogram - is up to date  Asthma -   PHQ9 -    CLAUDIA-7 -      The patient has verbalized that it is ok to leave a detailed voice message on the patient's cell phone with results/recommendations from this visit.

## 2023-09-18 NOTE — PROGRESS NOTES
SUBJECTIVE:   Wen Sanchez is a 63 year old female who complains of left upper back pain for many months, better with massage or with bending or lifting, without radiation down the arms but some radiation to left arm pit at times. Precipitating factors: none recalled by the patient. Prior history of back problems: no prior back problems. There is no numbness in the arms.    Pt worried about her heart- desires EKG    Patient Active Problem List   Diagnosis    Esophageal reflux    Hypoglycemia    Undiagnosed cardiac murmurs    Calculus of kidney    Health Care Home    Encounter for counseling    ACP (advance care planning)    Symptomatic menopausal or female climacteric states       Past Medical History:   Diagnosis Date    Family history of diabetes mellitus     Mother and Father    Gastroesophageal reflux disease     Kidney stones        Family History   Problem Relation Age of Onset    Diabetes Mother     Diabetes Father     Cancer Sister         cervical    Heart Disease No family hx of     Lipids No family hx of     Breast Cancer No family hx of     Cancer - colorectal No family hx of     Colon Cancer No family hx of        Social History     Socioeconomic History    Marital status:      Spouse name: Not on file    Number of children: 3    Years of education: 12    Highest education level: Not on file   Occupational History    Occupation:      Employer: [employer]    Occupation: retired   Tobacco Use    Smoking status: Never    Smokeless tobacco: Never   Substance and Sexual Activity    Alcohol use: Yes     Alcohol/week: 3.3 standard drinks of alcohol     Types: 4 Standard drinks or equivalent per week    Drug use: No    Sexual activity: Yes     Partners: Male     Birth control/protection: Pill   Other Topics Concern     Service Not Asked    Blood Transfusions Not Asked    Caffeine Concern Not Asked    Occupational Exposure Not Asked    Hobby Hazards Not Asked    Sleep Concern Not  Asked    Stress Concern Not Asked    Weight Concern Not Asked    Special Diet Not Asked    Back Care Not Asked    Exercise Yes    Bike Helmet No    Seat Belt Yes    Self-Exams Yes    Parent/sibling w/ CABG, MI or angioplasty before 65F 55M? Not Asked   Social History Narrative        Functional abiltity:      Hearing imparment:No      Acitvities of daily living:Normal      Risk of falls:No      Home safety of concern:No        Do you exercise?     Yes   Times/week: 2    History of abusive relationships in past:   No    History of abusive relationships currently:    No    Do you feel emotionally and physically safe in your environment?     Yes    Do you own a gun?  No      Is the gun kept in a safe place:   NOT APPLICABLE    Do you wear a seatbelt regularly?     Yes    Do you use sun screen?     Yes         Social Determinants of Health     Financial Resource Strain: Not on file   Food Insecurity: Not on file   Transportation Needs: Not on file   Physical Activity: Not on file   Stress: Not on file   Social Connections: Not on file   Intimate Partner Violence: Not on file   Housing Stability: Not on file       Past Surgical History:   Procedure Laterality Date    C MAMMOGRAM, SCREENING  2012    COLONOSCOPY  2010    COLONOSCOPY N/A 7/29/2019    Procedure: COLONOSCOPY;  Surgeon: Mike Hill MD;  Location:  GI    EYE EXAM ESTABLISHED PT  2013     UGI ENDOSCOPY DIAG W OR W/O BRUSH/WASH  2001    Dr. Machuca    LAPAROSCOPIC CHOLECYSTECTOMY N/A 9/18/2019    Procedure: CHOLECYSTECTOMY, LAPAROSCOPIC;  Surgeon: Elena Rico MD;  Location:  OR    TEST NOT FOUND  10/2001    Normal GBUS except for liver hemangioma    ZZC ENLARGE BREAST  9/01     EPINEPHrine (ANY BX GENERIC EQUIV) 0.3 MG/0.3ML injection 2-pack, Inject 0.3 mLs (0.3 mg) into the muscle as needed for anaphylaxis May repeat one time in 5-15 minutes if response to initial dose is inadequate.  fexofenadine (ALLEGRA) 180 MG tablet, Take 1 tablet by  "mouth every 24 hours  RABEprazole (ACIPHEX) 20 MG EC tablet, Take 1 tablet (20 mg) by mouth daily    No current facility-administered medications on file prior to visit.       Allergies: Food, Sesame oil, Cephalosporins, and Nickel      Immunization History   Administered Date(s) Administered    COVID-19 MONOVALENT 12+ (Pfizer) 03/23/2021, 04/13/2021, 12/04/2021    Influenza (IIV3) PF 10/22/2009    Influenza Vaccine >6 months (Alfuria,Fluzone) 11/18/2014, 11/05/2015    TD,PF 7+ (Tenivac) 01/01/1992, 06/11/2003, 05/10/2021    TDAP Vaccine (Boostrix) 05/18/2011    Td (Adult), Adsorbed 07/05/2001, 06/11/2003        OBJECTIVE:  /76 (BP Location: Right arm, Patient Position: Chair, Cuff Size: Adult Regular)   Pulse 68   Temp 97.5  F (36.4  C) (Temporal)   Resp 20   Ht 1.626 m (5' 4\")   Wt 62.6 kg (138 lb)   LMP 10/14/2013   BMI 23.69 kg/m     Patient appears to be in mild to moderate pain, antalgic gait noted. Lumbosacral spine area reveals no local tenderness or mass.  Painful and reduced left TS ROM noted. . DTR's, motor strength and sensation normal,  Peripheral pulses are palpable. X-Ray: not indicated.    EKG: normal sinus rhythm, no ST or T wave abnormalities , unchanged since 2020    ASSESSMENT:   (M54.9) Upper back pain on left side  (primary encounter diagnosis)  Comment: discussed likely muscular issues  Plan: stretches recommended, consider seeing nathaniel Haile at [facility]    f/u if not improving or worsening       Call or return to clinic prn if these symptoms worsen or fail to improve as anticipated.   "

## 2023-09-24 ENCOUNTER — HOSPITAL ENCOUNTER (EMERGENCY)
Facility: CLINIC | Age: 63
Discharge: HOME | End: 2023-09-25
Attending: EMERGENCY MEDICINE | Admitting: EMERGENCY MEDICINE
Payer: COMMERCIAL

## 2023-09-24 ENCOUNTER — APPOINTMENT (OUTPATIENT)
Dept: GENERAL RADIOLOGY | Facility: CLINIC | Age: 63
End: 2023-09-24
Attending: EMERGENCY MEDICINE
Payer: COMMERCIAL

## 2023-09-24 DIAGNOSIS — R07.9 CHEST PAIN, UNSPECIFIED TYPE: ICD-10-CM

## 2023-09-24 LAB
ANION GAP SERPL CALCULATED.3IONS-SCNC: 12 MMOL/L (ref 7–15)
BASOPHILS # BLD AUTO: 0 10E3/UL (ref 0–0.2)
BASOPHILS NFR BLD AUTO: 0 %
BUN SERPL-MCNC: 22.5 MG/DL (ref 8–23)
CALCIUM SERPL-MCNC: 9.8 MG/DL (ref 8.8–10.2)
CHLORIDE SERPL-SCNC: 104 MMOL/L (ref 98–107)
CREAT SERPL-MCNC: 0.74 MG/DL (ref 0.51–0.95)
DEPRECATED HCO3 PLAS-SCNC: 24 MMOL/L (ref 22–29)
EGFRCR SERPLBLD CKD-EPI 2021: 89 ML/MIN/1.73M2
EOSINOPHIL # BLD AUTO: 0.2 10E3/UL (ref 0–0.7)
EOSINOPHIL NFR BLD AUTO: 3 %
ERYTHROCYTE [DISTWIDTH] IN BLOOD BY AUTOMATED COUNT: 12.7 % (ref 10–15)
GLUCOSE SERPL-MCNC: 110 MG/DL (ref 70–99)
HCT VFR BLD AUTO: 42.4 % (ref 35–47)
HGB BLD-MCNC: 14.4 G/DL (ref 11.7–15.7)
HOLD SPECIMEN: NORMAL
HOLD SPECIMEN: NORMAL
IMM GRANULOCYTES # BLD: 0 10E3/UL
IMM GRANULOCYTES NFR BLD: 0 %
LYMPHOCYTES # BLD AUTO: 3.5 10E3/UL (ref 0.8–5.3)
LYMPHOCYTES NFR BLD AUTO: 53 %
MCH RBC QN AUTO: 30.6 PG (ref 26.5–33)
MCHC RBC AUTO-ENTMCNC: 34 G/DL (ref 31.5–36.5)
MCV RBC AUTO: 90 FL (ref 78–100)
MONOCYTES # BLD AUTO: 0.5 10E3/UL (ref 0–1.3)
MONOCYTES NFR BLD AUTO: 7 %
NEUTROPHILS # BLD AUTO: 2.5 10E3/UL (ref 1.6–8.3)
NEUTROPHILS NFR BLD AUTO: 37 %
NRBC # BLD AUTO: 0 10E3/UL
NRBC BLD AUTO-RTO: 0 /100
PLATELET # BLD AUTO: 256 10E3/UL (ref 150–450)
POTASSIUM SERPL-SCNC: 4.2 MMOL/L (ref 3.4–5.3)
RBC # BLD AUTO: 4.7 10E6/UL (ref 3.8–5.2)
SODIUM SERPL-SCNC: 140 MMOL/L (ref 136–145)
TROPONIN T SERPL HS-MCNC: <6 NG/L
WBC # BLD AUTO: 6.7 10E3/UL (ref 4–11)

## 2023-09-24 PROCEDURE — 85025 COMPLETE CBC W/AUTO DIFF WBC: CPT | Performed by: EMERGENCY MEDICINE

## 2023-09-24 PROCEDURE — 93005 ELECTROCARDIOGRAM TRACING: CPT

## 2023-09-24 PROCEDURE — 36415 COLL VENOUS BLD VENIPUNCTURE: CPT | Performed by: EMERGENCY MEDICINE

## 2023-09-24 PROCEDURE — 85379 FIBRIN DEGRADATION QUANT: CPT | Performed by: EMERGENCY MEDICINE

## 2023-09-24 PROCEDURE — 80048 BASIC METABOLIC PNL TOTAL CA: CPT | Performed by: EMERGENCY MEDICINE

## 2023-09-24 PROCEDURE — 84484 ASSAY OF TROPONIN QUANT: CPT | Performed by: EMERGENCY MEDICINE

## 2023-09-24 PROCEDURE — 71046 X-RAY EXAM CHEST 2 VIEWS: CPT

## 2023-09-24 PROCEDURE — 99285 EMERGENCY DEPT VISIT HI MDM: CPT | Mod: 25

## 2023-09-24 ASSESSMENT — ACTIVITIES OF DAILY LIVING (ADL): ADLS_ACUITY_SCORE: 33

## 2023-09-25 VITALS
RESPIRATION RATE: 16 BRPM | TEMPERATURE: 97.6 F | HEART RATE: 62 BPM | DIASTOLIC BLOOD PRESSURE: 84 MMHG | OXYGEN SATURATION: 99 % | SYSTOLIC BLOOD PRESSURE: 142 MMHG

## 2023-09-25 LAB
ATRIAL RATE - MUSE: 75 BPM
D DIMER PPP FEU-MCNC: <0.27 UG/ML FEU (ref 0–0.5)
DIASTOLIC BLOOD PRESSURE - MUSE: NORMAL MMHG
INTERPRETATION ECG - MUSE: NORMAL
P AXIS - MUSE: 54 DEGREES
PR INTERVAL - MUSE: 174 MS
QRS DURATION - MUSE: 86 MS
QT - MUSE: 394 MS
QTC - MUSE: 439 MS
R AXIS - MUSE: 7 DEGREES
SYSTOLIC BLOOD PRESSURE - MUSE: NORMAL MMHG
T AXIS - MUSE: 43 DEGREES
TROPONIN T SERPL HS-MCNC: <6 NG/L
VENTRICULAR RATE- MUSE: 75 BPM

## 2023-09-25 PROCEDURE — 36415 COLL VENOUS BLD VENIPUNCTURE: CPT | Performed by: EMERGENCY MEDICINE

## 2023-09-25 PROCEDURE — 84484 ASSAY OF TROPONIN QUANT: CPT | Performed by: EMERGENCY MEDICINE

## 2023-09-25 ASSESSMENT — ACTIVITIES OF DAILY LIVING (ADL): ADLS_ACUITY_SCORE: 35

## 2023-09-25 NOTE — ED TRIAGE NOTES
Pt states that she has had chest pain off and on for a couple months. Pt was seen at her clinic Monday and had an EKG. Pt states that her EKG looked the same as a previous EKG. No blood work was done. Pt states that she is coming in tonight for a pressure sensation in her chest/epigastrum. Pt states that she is also feeling short of breath. Denies exacerbating factors. Pt states that she is feeling anxious.

## 2023-09-25 NOTE — ED PROVIDER NOTES
History     Chief Complaint:  Chest Pain     The history is provided by the patient.      Wen Sanchez is a 63 year old female with a history of GERD who presents with chest pain. For the past 3 months, she has been experiencing intermittent episodes of left sided chest pain and back pain. Additionally, she will have brief episodes of tingling in her arms. She was seen by her primary care provider 6 days ago, and had an ECG done at the time which came back unremarkable. She continues to not feel well yesterday, and felt some pressure in the middle of her chest. Then earlier tonight, she suddenly woke up to chest pain again as well as shortness of breath. Her symptoms feel improved at bedside, but she will still have intermittent episodes of chest pain every once in a while. She does not have a history of pulmonary diseases. Pertinent family history includes CHF, but no clotting disorders. She denies recent long distance trips, surgeries, or prolonged periods of immobilization. She is not a smoker.  She denies palpitations, cough, fever, chills, or leg swelling.     Independent Historian:   None - Patient Only    Review of External Notes:   None    Medications:    Epinephrine   Aciphex     Past Medical History:    GERD   Kidney stones     Past Surgical History:    Cholecystectomy   Breast enlargement     Physical Exam   Patient Vitals for the past 24 hrs:   BP Temp Temp src Pulse Resp SpO2   09/25/23 0125 (!) 142/84 -- -- 62 16 99 %   09/25/23 0115 -- -- -- -- -- 98 %   09/25/23 0100 -- -- -- -- -- 96 %   09/25/23 0045 -- -- -- -- -- 96 %   09/25/23 0030 -- -- -- -- -- 96 %   09/24/23 2315 -- -- -- -- -- 96 %   09/24/23 2300 (!) 151/94 -- -- 60 -- 97 %   09/24/23 2127 (!) 139/100 97.6  F (36.4  C) Temporal 71 16 98 %        Physical Exam  General: Adult female sitting upright  Eyes: PERRL, Conjunctive within normal limits  ENT: Moist mucous membranes, oropharynx clear.   CV: Normal S1S2, no murmur, rub or  gallop. Regular rate and rhythm  Resp: Clear to auscultation bilaterally, no wheezes, rales or rhonchi. Normal respiratory effort.  GI: Abdomen is soft, nontender and nondistended. No palpable masses. No rebound or guarding.  MSK: No edema. Nontender. Normal active range of motion.  Skin: Warm and dry. No rashes or lesions or ecchymoses on visible skin.  Neuro: Alert and oriented. Responds appropriately to all questions and commands. No focal findings appreciated. Normal muscle tone.  Psych: Normal mood and affect. Pleasant.     Emergency Department Course   ECG  ECG taken at 2138, ECG read at 2255  Normal sinus rhythm   Anteroseptal infarct, age undetermined   Abnormal ECG   Rate 75 bpm. TN interval 174 ms. QRS duration 86 ms. QT/QTc 394/439 ms. P-R-T axes 54 7 43.     Imaging:  XR Chest 2 Views  Final Result  IMPRESSION: Heart size is normal. Lungs are clear. No pneumothorax or pleural effusion.     Report per radiology    Laboratory:  Labs Ordered and Resulted from Time of ED Arrival to Time of ED Departure   BASIC METABOLIC PANEL - Abnormal       Result Value    Sodium 140      Potassium 4.2      Chloride 104      Carbon Dioxide (CO2) 24      Anion Gap 12      Urea Nitrogen 22.5      Creatinine 0.74      Calcium 9.8      Glucose 110 (*)     GFR Estimate 89     TROPONIN T, HIGH SENSITIVITY - Normal    Troponin T, High Sensitivity <6     D DIMER QUANTITATIVE - Normal    D-Dimer Quantitative <0.27     TROPONIN T, HIGH SENSITIVITY - Normal    Troponin T, High Sensitivity <6     CBC WITH PLATELETS AND DIFFERENTIAL    WBC Count 6.7      RBC Count 4.70      Hemoglobin 14.4      Hematocrit 42.4      MCV 90      MCH 30.6      MCHC 34.0      RDW 12.7      Platelet Count 256      % Neutrophils 37      % Lymphocytes 53      % Monocytes 7      % Eosinophils 3      % Basophils 0      % Immature Granulocytes 0      NRBCs per 100 WBC 0      Absolute Neutrophils 2.5      Absolute Lymphocytes 3.5      Absolute Monocytes 0.5       Absolute Eosinophils 0.2      Absolute Basophils 0.0      Absolute Immature Granulocytes 0.0      Absolute NRBCs 0.0       Emergency Department Course & Assessments:    Interventions:  None    Assessments:  2332 I obtained history and examined the patient as noted above.  0048 I rechecked the patient and explained findings. I discussed plan for discharge home.  She felt comfortable with this plan.  All questions were answered prior to discharge.    Independent Interpretation (X-rays, CTs, rhythm strip):  Chest x-ray reviewed.  No pneumothorax, pleural effusion or infiltrate appreciated.    Consultations/Discussion of Management or Tests:  None    Social Determinants of Health affecting care:   None    Disposition:  The patient was discharged to home.     Impression & Plan    Medical Decision Making:  Elizabet Sanchez is a 63-year-old female low risk for urinary disease aside from age who presents emergency department with concerns for 3 months of intermittent left-sided chest pain with intermittent tingling in the arms with ongoing constant pain today.  She is overall hemodynamically reassuring with mild hypertension.  Her symptoms sound atypical in nature.  ECG did not show signs of acute ischemia or injury and serial troponins were within normal range.  Screening for PE with D-dimer was obtained given going symptoms with shortness of breath and as she is otherwise low risk, is no indication for CT scan with a normal D-dimer.  She had no findings on chest x-ray that were concerning.  She felt comfortable plan for discharge home as do I.  At this time does not appear to be acute life or limb threatening process.  She understands importance of follow-up with her primary care provider within 3 days.  She should return to the emergency department should her symptoms worsen or progress.  All questions were answered prior to discharge.      Diagnosis:    ICD-10-CM    1. Chest pain, unspecified type  R07.9            Scribe  Disclosure:  I, Rayo Benson, am serving as a scribe at 11:26 PM on 9/24/2023 to document services personally performed by Beth Lara MD based on my observations and the provider's statements to me.   9/24/2023   Beth Lara MD Jonkman, Tracy Dianne, MD  09/27/23 0527

## 2024-01-04 ENCOUNTER — OFFICE VISIT (OUTPATIENT)
Dept: FAMILY MEDICINE | Facility: CLINIC | Age: 64
End: 2024-01-04

## 2024-01-04 VITALS
HEIGHT: 64 IN | BODY MASS INDEX: 23.22 KG/M2 | RESPIRATION RATE: 20 BRPM | WEIGHT: 136 LBS | DIASTOLIC BLOOD PRESSURE: 80 MMHG | TEMPERATURE: 97.3 F | HEART RATE: 64 BPM | SYSTOLIC BLOOD PRESSURE: 118 MMHG

## 2024-01-04 DIAGNOSIS — J01.00 ACUTE NON-RECURRENT MAXILLARY SINUSITIS: Primary | ICD-10-CM

## 2024-01-04 PROCEDURE — 99213 OFFICE O/P EST LOW 20 MIN: CPT | Performed by: FAMILY MEDICINE

## 2024-01-04 NOTE — NURSING NOTE
Wen Sanchez is here for possible sinus infection. Has had sinus pain and pressure, with ear plugging for the past 10 days. Will be going back to [place] on Sunday.  Is starting to feel somewhat better.    Questioned patient about current smoking habits.  Pt. has never smoked.  PULSE regular  My Chart: active  CLASSIFICATION OF OVERWEIGHT AND OBESITY BY BMI                        Obesity Class           BMI(kg/m2)  Underweight                                    < 18.5  Normal                                         18.5-24.9  Overweight                                     25.0-29.9  OBESITY                     I                  30.0-34.9                             II                 35.0-39.9  EXTREME OBESITY             III                >40                            Patient's  BMI Body mass index is 23.34 kg/m .  http://hin.nhlbi.nih.gov/menuplanner/menu.cgi  Pre-visit planning  Immunizations - up to date  Colonoscopy - is up to date  Mammogram - is up to date  Asthma -   PHQ9 -    CLAUDIA-7 -      The patient has verbalized that it is ok to leave a detailed voice message on the patient's cell phone with results/recommendations from this visit.

## 2024-01-04 NOTE — PROGRESS NOTES
"SUBJECTIVE:   Wen Sanchez is a 63 year old female who complains of nasal congestion, nasal blockage, post nasal drip, facial pressure, left sinus pain, and fatigue for 10 days. She denies a history of productive cough, sweats, shortness of breath, and chest pain and denies a history of asthma. Patient does not smoke cigarettes.     OBJECTIVE:/80 (BP Location: Right arm, Patient Position: Chair, Cuff Size: Adult Regular)   Pulse 64   Temp 97.3  F (36.3  C) (Temporal)   Resp 20   Ht 1.626 m (5' 4\")   Wt 61.7 kg (136 lb)   LMP 10/14/2013   BMI 23.34 kg/m     She appears well, vital signs are as noted by the nurse. Ears normal.  Throat and pharynx normal.  Neck supple. No adenopathy in the neck. Nose is congested. Sinuses mildly tender-left maxiallry. The chest is clear, without wheezes or rales.    ASSESSMENT:   Sinusitis-may be viral as somewhat improved today- leaving for [place] so will give rx to fill if not better in next 2 days    PLAN:augmentin, I reviewed the risks, benefits, and possible side effects of the medication.  The patient had an opportunity to ask any questions regarding the treatment plan. The patient was encouraged to call my office if any problems. ,  Discussed viral vs. bacterial infections and need to avoid unnecessary prescribing of antibiotics to ensure that no resistance will develop. Will give prescription for antibiotic (see orders) to fill is symptoms do not improve in the next 2-3 days.   Symptomatic therapy suggested: push fluids and rest. Call or return to clinic prn if these symptoms worsen or fail to improve as anticipated.   "

## 2024-05-06 ENCOUNTER — OFFICE VISIT (OUTPATIENT)
Dept: FAMILY MEDICINE | Facility: CLINIC | Age: 64
End: 2024-05-06

## 2024-05-06 VITALS
BODY MASS INDEX: 22.71 KG/M2 | WEIGHT: 133 LBS | DIASTOLIC BLOOD PRESSURE: 74 MMHG | HEART RATE: 60 BPM | TEMPERATURE: 97.2 F | SYSTOLIC BLOOD PRESSURE: 110 MMHG | HEIGHT: 64 IN | RESPIRATION RATE: 20 BRPM

## 2024-05-06 DIAGNOSIS — Z23 NEED FOR VACCINATION: ICD-10-CM

## 2024-05-06 DIAGNOSIS — K21.9 GASTROESOPHAGEAL REFLUX DISEASE WITHOUT ESOPHAGITIS: ICD-10-CM

## 2024-05-06 DIAGNOSIS — Z00.00 ENCOUNTER FOR ANNUAL WELLNESS EXAM IN MEDICARE PATIENT: ICD-10-CM

## 2024-05-06 DIAGNOSIS — E78.2 MIXED HYPERLIPIDEMIA: ICD-10-CM

## 2024-05-06 DIAGNOSIS — F41.8 SITUATIONAL ANXIETY: ICD-10-CM

## 2024-05-06 DIAGNOSIS — Z11.59 SCREENING FOR VIRAL DISEASE: ICD-10-CM

## 2024-05-06 DIAGNOSIS — Z00.00 ROUTINE GENERAL MEDICAL EXAMINATION AT A HEALTH CARE FACILITY: Primary | ICD-10-CM

## 2024-05-06 LAB
ALBUMIN SERPL-MCNC: 4.3 G/DL (ref 3.6–5.1)
ALBUMIN/GLOB SERPL: 1.4 {RATIO} (ref 1–2.5)
ALP SERPL-CCNC: 76 U/L (ref 33–130)
ALT 1742-6: 14 U/L (ref 0–32)
AST 1920-8: 13 U/L (ref 0–35)
BILIRUB SERPL-MCNC: 0.8 MG/DL (ref 0.2–1.2)
BUN SERPL-MCNC: 19 MG/DL (ref 7–25)
BUN/CREATININE RATIO: 22.1 (ref 6–32)
CALCIUM SERPL-MCNC: 9.6 MG/DL (ref 8.6–10.3)
CHLORIDE SERPLBLD-SCNC: 107.4 MMOL/L (ref 98–110)
CHOLEST SERPL-MCNC: 263 MG/DL (ref 0–199)
CHOLEST/HDLC SERPL: 3 {RATIO} (ref 0–5)
CO2 SERPL-SCNC: 25.4 MMOL/L (ref 20–32)
CREAT SERPL-MCNC: 0.86 MG/DL (ref 0.6–1.3)
GLOBULIN, CALCULATED - QUEST: 3 (ref 1.9–3.7)
GLUCOSE SERPL-MCNC: 86 MG/DL (ref 60–99)
HDLC SERPL-MCNC: 100 MG/DL (ref 40–150)
LDLC SERPL CALC-MCNC: 147 MG/DL (ref 0–130)
POTASSIUM SERPL-SCNC: 4.41 MMOL/L (ref 3.5–5.3)
PROT SERPL-MCNC: 7.3 G/DL (ref 6.1–8.1)
SODIUM SERPL-SCNC: 143.1 MMOL/L (ref 135–146)
TRIGL SERPL-MCNC: 80 MG/DL (ref 0–149)

## 2024-05-06 PROCEDURE — G0439 PPPS, SUBSEQ VISIT: HCPCS | Mod: GA | Performed by: FAMILY MEDICINE

## 2024-05-06 PROCEDURE — 80053 COMPREHEN METABOLIC PANEL: CPT | Performed by: FAMILY MEDICINE

## 2024-05-06 PROCEDURE — 80061 LIPID PANEL: CPT | Performed by: FAMILY MEDICINE

## 2024-05-06 PROCEDURE — 99397 PER PM REEVAL EST PAT 65+ YR: CPT | Mod: 25 | Performed by: FAMILY MEDICINE

## 2024-05-06 PROCEDURE — 36415 COLL VENOUS BLD VENIPUNCTURE: CPT | Performed by: FAMILY MEDICINE

## 2024-05-06 PROCEDURE — 90677 PCV20 VACCINE IM: CPT | Performed by: FAMILY MEDICINE

## 2024-05-06 PROCEDURE — G0009 ADMIN PNEUMOCOCCAL VACCINE: HCPCS | Performed by: FAMILY MEDICINE

## 2024-05-06 NOTE — PROGRESS NOTES
Wen Sanchez is a 63 year old female who presents for Medicare Annual Wellness Visit.    Current providers caring for this patient include:  Patient Care Team:  Mike Beltran MD as PCP - General (Family Practice)  Mike Beltran MD as Assigned PCP    Complete Medical and Social history reviewed with patient, outlined below.    Patient Active Problem List   Diagnosis    Esophageal reflux    Hypoglycemia    Undiagnosed cardiac murmurs    Calculus of kidney    Health Care Home    Encounter for counseling    ACP (advance care planning)    Symptomatic menopausal or female climacteric states       Past Medical History:   Diagnosis Date    Family history of diabetes mellitus     Mother and Father    Gastroesophageal reflux disease     Kidney stones        Past Surgical History:   Procedure Laterality Date    C MAMMOGRAM, SCREENING  2012    COLONOSCOPY  2010    COLONOSCOPY N/A 7/29/2019    Procedure: COLONOSCOPY;  Surgeon: Mike Hill MD;  Location:  GI    EYE EXAM ESTABLISHED PT  2013     UGI ENDOSCOPY DIAG W OR W/O BRUSH/WASH  2001    Dr. Machuca    LAPAROSCOPIC CHOLECYSTECTOMY N/A 9/18/2019    Procedure: CHOLECYSTECTOMY, LAPAROSCOPIC;  Surgeon: Elena Rico MD;  Location:  OR    TEST NOT FOUND  10/2001    Normal GBUS except for liver hemangioma    ZZC ENLARGE BREAST  9/01       Family History   Problem Relation Age of Onset    Diabetes Mother     Diabetes Father     Cancer Sister         cervical    Heart Disease No family hx of     Lipids No family hx of     Breast Cancer No family hx of     Cancer - colorectal No family hx of     Colon Cancer No family hx of        Social History     Tobacco Use    Smoking status: Never     Passive exposure: Never    Smokeless tobacco: Never   Substance Use Topics    Alcohol use: Yes     Alcohol/week: 3.3 standard drinks of alcohol     Types: 4 Standard drinks or equivalent per week       Diet: regular, low salt/low fat  Physical  "Activity: active without specific exercise program  Depression Screen:    Over the past 2 weeks, patient has felt down, depressed, or hopeless:  No    Over the past 2 weeks, patient has felt little interest or pleasure in doing things: No    Functional ability/Safety screen:  Up and go test (able to get up and walk longer than 30 seconds): Passed  Patient needs assistance with: nothing  Patient's home has the following possible safety concerns: none identified  Patient has concerns about her hearing:  No  Cognitive Screen  Patient repeats three objects (ball, flag, tree)      Clock drawing test: click delete button to remove this line now  NORMAL  Recalls three objects after 3 minutes (ball,flag,tree):                                                                                               recalls 2 objects (2 points)    Physical Exam:  /74 (BP Location: Right arm, Patient Position: Chair, Cuff Size: Adult Regular)   Pulse 60   Temp 97.2  F (36.2  C) (Temporal)   Resp 20   Ht 1.626 m (5' 4\")   Wt 60.3 kg (133 lb)   LMP 10/14/2013   BMI 22.83 kg/m     Body mass index is 22.83 kg/m .               End of Life Planning:   Patient currently has an advanced directive: No.  I have verified the patient's ablity to prepare an advanced directive/make health care decisions.  Literature was provided to assist patient in preparing an advanced directive.    Education/Counseling:   Based on review of the above information, the following items were addressed:      Appropriate preventive services were discussed with this patient, including applicable screening as appropriate for cardiovascular disease, diabetes, osteopenia/osteoporosis, and glaucoma.  As appropriate for age/gender, discussed screening for colorectal cancer, prostate cancer, breast cancer, and cervical cancer.   Checklist reviewing preventive services available has been given to the patient.                 SUBJECTIVE:   CC: Wen Sanchez is " an 63 year old woman who presents for preventive health visit.       Patient has been advised of split billing requirements and indicates understanding: Yes  Healthy Habits:  Do you get at least three servings of calcium containing foods daily (dairy, green leafy vegetables, etc.)? yes  Amount of exercise or daily activities, outside of work: 5 day(s) per week  Problems taking medications regularly No  Medication side effects: No  Have you had an eye exam in the past two years? no  Do you see a dentist twice per year? yes  Do you have sleep apnea, excessive snoring or daytime drowsiness?yes          Today's PHQ-2 Score:       5/6/2024     8:12 AM 1/4/2024     3:03 PM   PHQ-2 ( 1999 [employer])   Q1: Little interest or pleasure in doing things 0 0   Q2: Feeling down, depressed or hopeless 0 0   PHQ-2 Score 0 0       Abuse: Current or Past(Physical, Sexual or Emotional)- No  Do you feel safe in your environment? Yes        Social History     Tobacco Use    Smoking status: Never     Passive exposure: Never    Smokeless tobacco: Never   Substance Use Topics    Alcohol use: Yes     Alcohol/week: 3.3 standard drinks of alcohol     Types: 4 Standard drinks or equivalent per week     If you drink alcohol do you typically have >3 drinks per day or >7 drinks per week? No                     Reviewed orders with patient.  Reviewed health maintenance and updated orders accordingly - Yes  BP Readings from Last 3 Encounters:   05/06/24 110/74   01/04/24 118/80   09/25/23 (!) 142/84    Wt Readings from Last 3 Encounters:   05/06/24 60.3 kg (133 lb)   01/04/24 61.7 kg (136 lb)   09/18/23 62.6 kg (138 lb)                  Patient Active Problem List   Diagnosis    Esophageal reflux    Hypoglycemia    Undiagnosed cardiac murmurs    Calculus of kidney    Health Care Home    Encounter for counseling    ACP (advance care planning)    Symptomatic menopausal or female climacteric states     Past Surgical History:   Procedure Laterality Date     C MAMMOGRAM, SCREENING  2012    COLONOSCOPY  2010    COLONOSCOPY N/A 7/29/2019    Procedure: COLONOSCOPY;  Surgeon: Mike Hill MD;  Location:  GI    EYE EXAM ESTABLISHED PT  2013     UGI ENDOSCOPY DIAG W OR W/O BRUSH/WASH  2001    Dr. Machuca    LAPAROSCOPIC CHOLECYSTECTOMY N/A 9/18/2019    Procedure: CHOLECYSTECTOMY, LAPAROSCOPIC;  Surgeon: Elena Rico MD;  Location: [facility] OR    TEST NOT FOUND  10/2001    Normal GBUS except for liver hemangioma    ZZC ENLARGE BREAST  9/01       Social History     Tobacco Use    Smoking status: Never     Passive exposure: Never    Smokeless tobacco: Never   Substance Use Topics    Alcohol use: Yes     Alcohol/week: 7.0 standard drinks of alcohol     Types: 7 Standard drinks or equivalent per week     Comment: one glass wine daily     Family History   Problem Relation Age of Onset    Diabetes Mother     Diabetes Father     Cancer Sister         cervical    Heart Disease No family hx of     Lipids No family hx of     Breast Cancer No family hx of     Cancer - colorectal No family hx of     Colon Cancer No family hx of          Current Outpatient Medications   Medication Sig Dispense Refill    EPINEPHrine (ANY BX GENERIC EQUIV) 0.3 MG/0.3ML injection 2-pack Inject 0.3 mLs (0.3 mg) into the muscle as needed for anaphylaxis May repeat one time in 5-15 minutes if response to initial dose is inadequate. 2 each 1    fexofenadine (ALLEGRA) 180 MG tablet Take 1 tablet by mouth every 24 hours      RABEprazole (ACIPHEX) 20 MG EC tablet Take 1 tablet (20 mg) by mouth daily       Allergies   Allergen Reactions    Food Anaphylaxis     Cinnamon Toast Crunch Cereal    Sesame Oil Anaphylaxis    Cephalosporins Rash     Reaction was during a surgery, MD thinks the reaction was to the cephalosporin rather than an anesthetic agent  Reaction was severe rash and itching    Nickel Rash     Recent Labs   Lab Test 09/24/23  2132 04/11/23  0000 10/23/21  0141 07/27/21  0000 05/10/21  1412  01/10/21  1016 09/28/20  1512 06/03/20  0000 11/15/18  0840 10/21/18  0850 08/16/17  1902 02/25/17  1520 02/14/17  1012   A1C  --   --   --   --  5.1  --   --   --   --   --   --   --   --    LDL  --  168*  --  150*  --   --   --  131*   < >  --   --   --  127   HDL  --  94  --  89  --   --   --  94   < >  --   --   --  90   TRIG  --  72  --  94  --   --   --  66   < >  --   --   --  62   ALT  --   --   --   --   --   --   --   --   --  29 18  --  17   CR 0.74 0.81 0.74 0.77  --  0.61  --  0.75  --  0.74 0.92   < > 0.72   GFRESTIMATED 89  --  86  --   --  >90  --   --   --  80 69   < > 92   GFRESTBLACK  --   --   --   --   --  >90  --   --   --  >90  --    < >  --    POTASSIUM 4.2 4.53 3.9 4.30  --  3.6  --  4.04  --  3.4 3.9   < > 4.0   TSH  --   --   --   --   --   --  1.83  --   --   --   --   --   --     < > = values in this interval not displayed.        FHS-7:       1/27/2022     9:35 AM 2/20/2023     2:26 PM   Breast CA Risk Assessment (FHS-7)   Did any of your first-degree relatives have breast or ovarian cancer? No No   Did any of your relatives have bilateral breast cancer? No No   Did any man in your family have breast cancer? No No   Did any woman in your family have breast and ovarian cancer? No No   Did any woman in your family have breast cancer before age 50 y? No No   Do you have 2 or more relatives with breast and/or ovarian cancer? No No   Do you have 2 or more relatives with breast and/or bowel cancer? No No       Mammogram Screening - Mammogram every 1-2 years updated in Health Maintenance based on mutual decision making  Pertinent mammograms are reviewed under the imaging tab.    Pertinent mammograms are reviewed under the imaging tab.  History of abnormal Pap smear: NO - age 63 - see link Cervical Cytology Screening Guidelines     Reviewed and updated as needed this visit by clinical staff   Tobacco   Meds              Reviewed and updated as needed this visit by Provider                 "  Past Medical History:   Diagnosis Date    Family history of diabetes mellitus     Mother and Father    Gastroesophageal reflux disease     Kidney stones       Past Surgical History:   Procedure Laterality Date    C MAMMOGRAM, SCREENING  2012    COLONOSCOPY  2010    COLONOSCOPY N/A 7/29/2019    Procedure: COLONOSCOPY;  Surgeon: Mike Hill MD;  Location:  GI    EYE EXAM ESTABLISHED PT  2013     UGI ENDOSCOPY DIAG W OR W/O BRUSH/WASH  2001    Dr. Machuca    LAPAROSCOPIC CHOLECYSTECTOMY N/A 9/18/2019    Procedure: CHOLECYSTECTOMY, LAPAROSCOPIC;  Surgeon: Elena Rico MD;  Location:  OR    TEST NOT FOUND  10/2001    Normal GBUS except for liver hemangioma    ZZC ENLARGE BREAST  9/01       ROS:  CONSTITUTIONAL: NEGATIVE for fever, chills, change in weight  INTEGUMENTARY/SKIN: NEGATIVE for worrisome rashes, moles or lesions  EYES: NEGATIVE for vision changes or irritation  ENT: NEGATIVE for ear, mouth and throat problems  RESP: NEGATIVE for significant cough or SOB  BREAST: NEGATIVE for masses, tenderness or discharge  CV: NEGATIVE for chest pain, palpitations or peripheral edema  GI: NEGATIVE for nausea, abdominal pain, heartburn, or change in bowel habits  : NEGATIVE for unusual urinary or vaginal symptoms. No vaginal bleeding.  MUSCULOSKELETAL: NEGATIVE for significant arthralgias or myalgia  NEURO: NEGATIVE for weakness, dizziness or paresthesias  PSYCHIATRIC: NEGATIVE for changes in mood or affect     OBJECTIVE:   /74 (BP Location: Right arm, Patient Position: Chair, Cuff Size: Adult Regular)   Pulse 60   Temp 97.2  F (36.2  C) (Temporal)   Resp 20   Ht 1.626 m (5' 4\")   Wt 60.3 kg (133 lb)   LMP 10/14/2013   BMI 22.83 kg/m    EXAM:  GENERAL: alert and no distress  EYES: Eyes grossly normal to inspection, PERRL and conjunctivae and sclerae normal  HENT: ear canals and TM's normal, nose and mouth without ulcers or lesions  NECK: no adenopathy, no asymmetry, masses, or scars  RESP: " lungs clear to auscultation - no rales, rhonchi or wheezes  CV: regular rate and rhythm, normal S1 S2, no S3 or S4, no murmur, click or rub, no peripheral edema  ABDOMEN: soft, nontender, no hepatosplenomegaly, no masses and bowel sounds normal  MS: no gross musculoskeletal defects noted, no edema  SKIN: no suspicious lesions or rashes  NEURO: Normal strength and tone, mentation intact and speech normal  PSYCH: mentation appears normal, affect normal/bright    Diagnostic Test Results:  Labs reviewed in Epic    ASSESSMENT/PLAN:   (Z00.00) Routine general medical examination at a health care facility  (primary encounter diagnosis)  Comment: discussed preventitive healthcare   Plan: Continue to work on healthy diet and exercise, discussed healthy habits     (E78.2) Mixed hyperlipidemia  Comment: control uncertain  Plan: Continue to work on healthy diet and exercise, discussed healthy habits     (K21.9) Gastroesophageal reflux disease without esophagitis  Comment: well controlled  Plan: continue current medications at current doses     (Z00.00) Encounter for annual wellness exam in Medicare patient  Comment: discussed preventitive healthcare   Plan: Continue to work on healthy diet and exercise, discussed healthy habits     (Z11.59) Screening for viral disease  Comment: per cdc  Plan:     (Z23) Need for vaccination  Comment:   Plan:     (F41.8) Situational anxiety  Comment: uses rare benzo- last filled 2021- does nto need now but we can OK small amount ativan if needed in future  Plan:     Patient has been advised of split billing requirements and indicates understanding: Yes  COUNSELING:   Reviewed preventive health counseling, as reflected in patient instructions       Regular exercise       Healthy diet/nutrition       Vision screening       Hearing screening       Pneumococcal Vaccine          Colorectal Cancer Screening    Estimated body mass index is 22.83 kg/m  as calculated from the following:    Height as of  "this encounter: 1.626 m (5' 4\").    Weight as of this encounter: 60.3 kg (133 lb).        She reports that she has never smoked. She has never been exposed to tobacco smoke. She has never used smokeless tobacco.      Counseling Resources:  ATP IV Guidelines  Pooled Cohorts Equation Calculator  Breast Cancer Risk Calculator  BRCA-Related Cancer Risk Assessment: FHS-7 Tool  FRAX Risk Assessment  ICSI Preventive Guidelines  Dietary Guidelines for Americans, 2010  USDA's MyPlate  ASA Prophylaxis  Lung CA Screening    Mike Beltran MD  [facility]  "

## 2024-05-06 NOTE — NURSING NOTE
Wen Sanchez is here for a CPX and Wellness    Pre-visit planning  Immunizations -up to date  Colonoscopy -is up to date  Mammogram -is due and to be scheduled by patient for later completion, insurance issue  Asthma test --  PHQ9 -  CLAUDIA 7 -      Questioned patient about current smoking habits.  Pt. has never smoked.  Body mass index is 22.83 kg/m .  PULSE regular  My Chart: active  CLASSIFICATION OF OVERWEIGHT AND OBESITY BY BMI                        Obesity Class           BMI(kg/m2)  Underweight                                    < 18.5  Normal                                         18.5-24.9  Overweight                                     25.0-29.9  OBESITY                     I                  30.0-34.9                             II                 35.0-39.9  EXTREME OBESITY             III                >40                            Patient's  BMI Body mass index is 22.83 kg/m .      The patient has verbalized that it is ok to leave a detailed voice message on the patient's cell phone with results/recommendations from this visit.         
No

## 2024-05-07 LAB — HIV 1/2 AGN ABY 4TH GEN WITH REFLEX: NORMAL

## 2024-05-10 ENCOUNTER — HOSPITAL ENCOUNTER (OUTPATIENT)
Dept: MAMMOGRAPHY | Facility: CLINIC | Age: 64
Discharge: HOME | End: 2024-05-10
Attending: FAMILY MEDICINE | Admitting: FAMILY MEDICINE
Payer: MEDICARE

## 2024-05-10 DIAGNOSIS — Z12.31 VISIT FOR SCREENING MAMMOGRAM: ICD-10-CM

## 2024-05-10 PROCEDURE — 77067 SCR MAMMO BI INCL CAD: CPT

## 2024-06-05 ENCOUNTER — MEDICAL CORRESPONDENCE (OUTPATIENT)
Dept: HEALTH INFORMATION MANAGEMENT | Facility: CLINIC | Age: 64
End: 2024-06-05
Payer: MEDICARE

## 2024-06-10 ENCOUNTER — HOSPITAL ENCOUNTER (OUTPATIENT)
Facility: CLINIC | Age: 64
Discharge: HOME | End: 2024-06-10
Attending: COLON & RECTAL SURGERY | Admitting: COLON & RECTAL SURGERY
Payer: MEDICARE

## 2024-06-10 VITALS
SYSTOLIC BLOOD PRESSURE: 107 MMHG | WEIGHT: 135 LBS | RESPIRATION RATE: 17 BRPM | HEART RATE: 58 BPM | DIASTOLIC BLOOD PRESSURE: 83 MMHG | OXYGEN SATURATION: 94 % | HEIGHT: 64 IN | BODY MASS INDEX: 23.05 KG/M2

## 2024-06-10 LAB — COLONOSCOPY: NORMAL

## 2024-06-10 PROCEDURE — 88305 TISSUE EXAM BY PATHOLOGIST: CPT | Mod: TC | Performed by: COLON & RECTAL SURGERY

## 2024-06-10 PROCEDURE — G0500 MOD SEDAT ENDO SERVICE >5YRS: HCPCS | Performed by: COLON & RECTAL SURGERY

## 2024-06-10 PROCEDURE — 250N000011 HC RX IP 250 OP 636: Performed by: COLON & RECTAL SURGERY

## 2024-06-10 PROCEDURE — 45380 COLONOSCOPY AND BIOPSY: CPT | Performed by: COLON & RECTAL SURGERY

## 2024-06-10 PROCEDURE — 99153 MOD SED SAME PHYS/QHP EA: CPT | Performed by: COLON & RECTAL SURGERY

## 2024-06-10 RX ORDER — FLUMAZENIL 0.1 MG/ML
0.2 INJECTION, SOLUTION INTRAVENOUS
Status: DISCONTINUED | OUTPATIENT
Start: 2024-06-10 | End: 2024-06-10 | Stop reason: HOSPADM

## 2024-06-10 RX ORDER — NALOXONE HYDROCHLORIDE 0.4 MG/ML
0.2 INJECTION, SOLUTION INTRAMUSCULAR; INTRAVENOUS; SUBCUTANEOUS
Status: DISCONTINUED | OUTPATIENT
Start: 2024-06-10 | End: 2024-06-10 | Stop reason: HOSPADM

## 2024-06-10 RX ORDER — NALOXONE HYDROCHLORIDE 0.4 MG/ML
0.4 INJECTION, SOLUTION INTRAMUSCULAR; INTRAVENOUS; SUBCUTANEOUS
Status: DISCONTINUED | OUTPATIENT
Start: 2024-06-10 | End: 2024-06-10 | Stop reason: HOSPADM

## 2024-06-10 RX ORDER — ONDANSETRON 4 MG/1
4 TABLET, ORALLY DISINTEGRATING ORAL EVERY 6 HOURS PRN
Status: DISCONTINUED | OUTPATIENT
Start: 2024-06-10 | End: 2024-06-10 | Stop reason: HOSPADM

## 2024-06-10 RX ORDER — FENTANYL CITRATE 50 UG/ML
50-100 INJECTION, SOLUTION INTRAMUSCULAR; INTRAVENOUS EVERY 5 MIN PRN
Status: DISCONTINUED | OUTPATIENT
Start: 2024-06-10 | End: 2024-06-10 | Stop reason: HOSPADM

## 2024-06-10 RX ORDER — ATROPINE SULFATE 0.1 MG/ML
1 INJECTION INTRAVENOUS
Status: DISCONTINUED | OUTPATIENT
Start: 2024-06-10 | End: 2024-06-10 | Stop reason: HOSPADM

## 2024-06-10 RX ORDER — SIMETHICONE 40MG/0.6ML
133 SUSPENSION, DROPS(FINAL DOSAGE FORM)(ML) ORAL
Status: DISCONTINUED | OUTPATIENT
Start: 2024-06-10 | End: 2024-06-10 | Stop reason: HOSPADM

## 2024-06-10 RX ORDER — ONDANSETRON 2 MG/ML
4 INJECTION INTRAMUSCULAR; INTRAVENOUS EVERY 6 HOURS PRN
Status: DISCONTINUED | OUTPATIENT
Start: 2024-06-10 | End: 2024-06-10 | Stop reason: HOSPADM

## 2024-06-10 RX ORDER — EPINEPHRINE 1 MG/ML
0.1 INJECTION, SOLUTION INTRAMUSCULAR; SUBCUTANEOUS
Status: DISCONTINUED | OUTPATIENT
Start: 2024-06-10 | End: 2024-06-10 | Stop reason: HOSPADM

## 2024-06-10 RX ORDER — PROCHLORPERAZINE MALEATE 5 MG
5 TABLET ORAL EVERY 6 HOURS PRN
Status: DISCONTINUED | OUTPATIENT
Start: 2024-06-10 | End: 2024-06-10 | Stop reason: HOSPADM

## 2024-06-10 RX ORDER — ONDANSETRON 2 MG/ML
4 INJECTION INTRAMUSCULAR; INTRAVENOUS
Status: DISCONTINUED | OUTPATIENT
Start: 2024-06-10 | End: 2024-06-10 | Stop reason: HOSPADM

## 2024-06-10 RX ORDER — LIDOCAINE 40 MG/G
CREAM TOPICAL
Status: DISCONTINUED | OUTPATIENT
Start: 2024-06-10 | End: 2024-06-10 | Stop reason: HOSPADM

## 2024-06-10 RX ORDER — DIPHENHYDRAMINE HYDROCHLORIDE 50 MG/ML
25-50 INJECTION INTRAMUSCULAR; INTRAVENOUS
Status: DISCONTINUED | OUTPATIENT
Start: 2024-06-10 | End: 2024-06-10 | Stop reason: HOSPADM

## 2024-06-10 RX ADMIN — MIDAZOLAM 1 MG: 1 INJECTION INTRAMUSCULAR; INTRAVENOUS at 09:26

## 2024-06-10 RX ADMIN — FENTANYL CITRATE 100 MCG: 50 INJECTION, SOLUTION INTRAMUSCULAR; INTRAVENOUS at 09:18

## 2024-06-10 RX ADMIN — MIDAZOLAM 1 MG: 1 INJECTION INTRAMUSCULAR; INTRAVENOUS at 09:23

## 2024-06-10 RX ADMIN — MIDAZOLAM 2 MG: 1 INJECTION INTRAMUSCULAR; INTRAVENOUS at 09:18

## 2024-06-10 RX ADMIN — FENTANYL CITRATE 50 MCG: 50 INJECTION, SOLUTION INTRAMUSCULAR; INTRAVENOUS at 09:30

## 2024-06-10 ASSESSMENT — ACTIVITIES OF DAILY LIVING (ADL)
ADLS_ACUITY_SCORE: 35
ADLS_ACUITY_SCORE: 35

## 2024-06-10 NOTE — H&P
Pre-Endoscopy History and Physical     Wen Sanchez MRN# [id number]   YOB: 1960 Age: 63 year old     Date of Procedure: 6/10/2024  Primary care provider: Mike Beltran  Type of Endoscopy: colonoscopy  Reason for Procedure: rectal bleeding screening  Type of Anesthesia Anticipated: Moderate Sedation    HPI:    Wen is a 63 year old female who will be undergoing the above procedure.      A history and physical has been performed. The patient's medications and allergies have been reviewed. The risks and benefits of the procedure and the sedation options and risks were discussed with the patient.  All questions were answered and informed consent was obtained.      She denies a personal or family history of anesthesia complications or bleeding disorders.     Allergies   Allergen Reactions    Food Anaphylaxis     Cinnamon Toast Crunch Cereal    Sesame Oil Anaphylaxis    Cephalosporins Rash     Reaction was during a surgery, MD thinks the reaction was to the cephalosporin rather than an anesthetic agent  Reaction was severe rash and itching    Nickel Rash        Prior to Admission Medications   Prescriptions Last Dose Informant Patient Reported? Taking?   EPINEPHrine (ANY BX GENERIC EQUIV) 0.3 MG/0.3ML injection 2-pack   No No   Sig: Inject 0.3 mLs (0.3 mg) into the muscle as needed for anaphylaxis May repeat one time in 5-15 minutes if response to initial dose is inadequate.   RABEprazole (ACIPHEX) 20 MG EC tablet Past Week  Yes Yes   Sig: Take 1 tablet (20 mg) by mouth daily   fexofenadine (ALLEGRA) 180 MG tablet   Yes No   Sig: Take 1 tablet by mouth every 24 hours      Facility-Administered Medications: None       Patient Active Problem List   Diagnosis    Esophageal reflux    Hypoglycemia    Undiagnosed cardiac murmurs    Calculus of kidney    Health Care Home    Encounter for counseling    ACP (advance care planning)    Symptomatic menopausal or female climacteric states     "    Past Medical History:   Diagnosis Date    Family history of diabetes mellitus     Mother and Father    Gastroesophageal reflux disease     Kidney stones         Past Surgical History:   Procedure Laterality Date    C MAMMOGRAM, SCREENING  2012    COLONOSCOPY  2010    COLONOSCOPY N/A 7/29/2019    Procedure: COLONOSCOPY;  Surgeon: Mike Hill MD;  Location:  GI    EYE EXAM ESTABLISHED PT  2013     UGI ENDOSCOPY DIAG W OR W/O BRUSH/WASH  2001    Dr. Machuca    LAPAROSCOPIC CHOLECYSTECTOMY N/A 9/18/2019    Procedure: CHOLECYSTECTOMY, LAPAROSCOPIC;  Surgeon: Elena Rico MD;  Location: [facility] OR    TEST NOT FOUND  10/2001    Normal GBUS except for liver hemangioma    ZZC ENLARGE BREAST  9/01       Social History     Tobacco Use    Smoking status: Never     Passive exposure: Never    Smokeless tobacco: Never   Substance Use Topics    Alcohol use: Yes     Alcohol/week: 7.0 standard drinks of alcohol     Types: 7 Standard drinks or equivalent per week     Comment: one glass wine daily       Family History   Problem Relation Age of Onset    Diabetes Mother     Diabetes Father     Cancer Sister         cervical    Heart Disease No family hx of     Lipids No family hx of     Breast Cancer No family hx of     Cancer - colorectal No family hx of     Colon Cancer No family hx of        REVIEW OF SYSTEMS:     5 point ROS negative except as noted above in HPI, including Gen., Resp., CV, GI &  system review.      PHYSICAL EXAM:   Ht 1.626 m (5' 4\")   Wt 61.2 kg (135 lb)   LMP 10/14/2013   SpO2 97%   BMI 23.17 kg/m   Estimated body mass index is 23.17 kg/m  as calculated from the following:    Height as of this encounter: 1.626 m (5' 4\").    Weight as of this encounter: 61.2 kg (135 lb).   GENERAL APPEARANCE: healthy and alert  MENTAL STATUS: alert  AIRWAY EXAM: Mallampatti Class I (visualization of the soft palate, fauces, uvula, anterior and posterior pillars)  RESP: lungs clear to auscultation - no rales, " rhonchi or wheezes  CV: regular rates and rhythm      DIAGNOSTICS:    Not indicated      IMPRESSION   ASA Class 2 - Mild systemic disease        PLAN:       Plan for colonoscopy. We discussed the risks, benefits and alternatives and the patient wished to proceed.    The above has been forwarded to the consulting provider.      Signed Electronically by: Chante Cano MD, MD  Amanda 10, 2024

## 2024-06-11 PROCEDURE — 88305 TISSUE EXAM BY PATHOLOGIST: CPT | Mod: 26 | Performed by: PATHOLOGY

## 2024-07-18 ENCOUNTER — TRANSFERRED RECORDS (OUTPATIENT)
Dept: FAMILY MEDICINE | Facility: CLINIC | Age: 64
End: 2024-07-18

## 2024-08-26 ENCOUNTER — OFFICE VISIT (OUTPATIENT)
Dept: FAMILY MEDICINE | Facility: CLINIC | Age: 64
End: 2024-08-26

## 2024-08-26 VITALS
SYSTOLIC BLOOD PRESSURE: 116 MMHG | TEMPERATURE: 97.8 F | WEIGHT: 131 LBS | DIASTOLIC BLOOD PRESSURE: 82 MMHG | OXYGEN SATURATION: 97 % | HEART RATE: 65 BPM | BODY MASS INDEX: 22.49 KG/M2

## 2024-08-26 DIAGNOSIS — R10.9 RIGHT FLANK PAIN: ICD-10-CM

## 2024-08-26 DIAGNOSIS — S39.011A STRAIN OF ABDOMINAL MUSCLE, INITIAL ENCOUNTER: Primary | ICD-10-CM

## 2024-08-26 LAB
APPEARANCE UR: CLEAR
BACTERIA, UR: ABNORMAL
BILIRUB UR QL: ABNORMAL
CASTS/LPF: ABNORMAL
COLOR UR: YELLOW
EP/HPF: ABNORMAL
GLUCOSE URINE: ABNORMAL MG/DL
HGB UR QL: ABNORMAL
KETONES UR QL: ABNORMAL MG/DL
MISC.: ABNORMAL
NITRITE UR QL STRIP: ABNORMAL
PH UR STRIP: 5.5 PH (ref 5–7)
PROT UR QL: ABNORMAL MG/DL
RBC, UR MICRO: ABNORMAL (ref ?–2)
SP GR UR STRIP: ABNORMAL
UROBILINOGEN UR QL STRIP: 0.2 EU/DL (ref 0.2–1)
WBC #/AREA URNS HPF: ABNORMAL /[HPF]
WBC, UR MICRO: ABNORMAL (ref ?–2)

## 2024-08-26 PROCEDURE — 99213 OFFICE O/P EST LOW 20 MIN: CPT

## 2024-08-26 PROCEDURE — 81001 URINALYSIS AUTO W/SCOPE: CPT

## 2024-08-26 NOTE — NURSING NOTE
"Chief Complaint   Patient presents with    Pain     Right sided pain started 7 days ago, she thought this may be a \"kidney stone\", she has been drinking a lot of water, it started to become worse last night and this morning but is now better today, she has been doing exercises for her abdomen     Pre-visit Screening:  Immunizations:  not up to date - shingrix at pharmacy  Colonoscopy:  is up to date  Mammogram: is up to date  Asthma Action Test/Plan:  NA  PHQ9:  NA  GAD7:  NA  Questioned patient about current smoking habits Pt. has never smoked.  Ok to leave detailed message on voice mail for today's visit only Yes, phone # [phone]    "

## 2024-08-26 NOTE — PROGRESS NOTES
"Assessment & Plan     1. Strain of abdominal muscle, initial encounter  - Discussed with Elizabet her symptoms and exam are most consistent with a muscle strain. UA relatively clear other than trace leukocytes, will await for urine culture to ensure no infection and send patient an updated on my chart. Encouraged her to try PT exercises for pain and also try alternating ice/heat over site of pain. Return to clinic and ER precautions discussed.     2. Right flank pain  - See above.   - URINALYSIS, ROUTINE ([facility])  - URINE CULTURE AEROBIC BACTERIAL ([facility])    Follow up as needed. Reasons to follow-up sooner or seek emergent care reviewed.     MEKA Naranjo-C  [facility]       Subjective     Wen Sanchez is a 64 year old female who presents to clinic today for the following health issues:    HPI   Chief Complaint   Patient presents with    Pain     Right sided pain started 7 days ago, she thought this may be a \"kidney stone\", she has been drinking a lot of water, it started to become worse last night and this morning but is now better today, she has been doing exercises for her abdomen      Elizabet presents with her  with concerns of right sided upper abdominal pain that wraps to the right side of her upper.mid back. She notes the pain started about a week ago and the week prior she went to PT for pelvic floor therapy and notes then she had pain afterwards. She notes in general her pain has been on and off and that it feels like a dull ache. She also notes a couple of times she felt nauseous, denies any vomiting. She also had some increased urinary urgency and frequency but denies any urinary symptoms now. She is concerned her pain may be due to a kidney stone as she has had them before in the past (twice before). She denies any blood in her urine. She also notes she had some muscle pain in her chest before and has done PT for this in the past and it was helpful. She denies any symptoms of " fevers/chills, lower abdominal pain, changes in bowel movements, chest pain, SOB, etc. Patient's abdominal surgical history is positive for cholecystectomy.     Daily medications reviewed.       Objective    /82 (BP Location: Right arm, Patient Position: Sitting, Cuff Size: Adult Regular)   Pulse 65   Temp 97.8  F (36.6  C) (Temporal)   Wt 59.4 kg (131 lb)   LMP 10/14/2013   SpO2 97%   BMI 22.49 kg/m    Body mass index is 22.49 kg/m .    Physical Examination:  GENERAL: healthy, alert and no distress  EYES: Eyes grossly normal to inspection  HENT: mouth without ulcers or lesions  NECK: no adenopathy, no asymmetry, masses, or scars and thyroid normal to palpation  RESP: lungs clear to auscultation - no rales, rhonchi or wheezes  CV: regular rate and rhythm, normal S1 S2, no S3 or S4, no murmur, click or rub, no peripheral edema   ABDOMEN: mild tenderness noted in RUQ, otherwise abdomen is soft and non-tender, no hepatosplenomegaly, negative lindquist's sign, no masses and bowel sounds normal, no CVA tenderness bilaterally   MS: no gross musculoskeletal defects noted, no edema  SKIN: no suspicious lesions or rashes  PSYCH: mentation appears normal, affect normal/bright    Labs reviewed.  Results for orders placed or performed in visit on 08/26/24   URINALYSIS, ROUTINE ([facility])     Status: Abnormal   Result Value Ref Range    Color Urine Yellow     Appearance Urine Clear     Glucose Urine Neg mg/dL    Bilirubin Urine Neg     Ketones Urine Neg mg/dL    Specific Gravity Urine Other (A)     Blood Urine Neg     pH Urine 5.5 pH    Protein Urine neg neg - neg mg/dL    Urobilinogen Urine 0.2 EU/dL    Nitrite Urine Neg     Leukocytes trace (A)     Wbc, Urine Micro 1-2 neg - 2    RBC Micro Urine neg neg - 2    EP/HPF neg     Bacteria Urine few (A) neg - neg    Casts/LPF neg     Miscellaneous neg

## 2024-08-28 LAB — URINE VOIDED CULTURE: NORMAL

## 2024-09-04 ENCOUNTER — OFFICE VISIT (OUTPATIENT)
Dept: FAMILY MEDICINE | Facility: CLINIC | Age: 64
End: 2024-09-04

## 2024-09-04 VITALS
HEART RATE: 60 BPM | WEIGHT: 131 LBS | RESPIRATION RATE: 20 BRPM | HEIGHT: 64 IN | SYSTOLIC BLOOD PRESSURE: 136 MMHG | BODY MASS INDEX: 22.36 KG/M2 | DIASTOLIC BLOOD PRESSURE: 80 MMHG

## 2024-09-04 DIAGNOSIS — R30.0 DYSURIA: Primary | ICD-10-CM

## 2024-09-04 LAB
APPEARANCE UR: CLEAR
BACTERIA, UR: ABNORMAL
BILIRUB UR QL: ABNORMAL
CASTS/LPF: ABNORMAL
COLOR UR: YELLOW
EP/HPF: ABNORMAL
GLUCOSE URINE: ABNORMAL MG/DL
HGB UR QL: ABNORMAL
KETONES UR QL: ABNORMAL MG/DL
MISC.: ABNORMAL
NITRITE UR QL STRIP: ABNORMAL
PH UR STRIP: 6 PH (ref 5–7)
PROT UR QL: ABNORMAL MG/DL
RBC, UR MICRO: 0.2 (ref ?–2)
SP GR UR STRIP: 1.01
UROBILINOGEN UR QL STRIP: 0.2 EU/DL (ref 0.2–1)
WBC #/AREA URNS HPF: ABNORMAL /[HPF]
WBC, UR MICRO: ABNORMAL (ref ?–2)

## 2024-09-04 PROCEDURE — 87086 URINE CULTURE/COLONY COUNT: CPT | Mod: 90 | Performed by: FAMILY MEDICINE

## 2024-09-04 PROCEDURE — 81001 URINALYSIS AUTO W/SCOPE: CPT | Performed by: FAMILY MEDICINE

## 2024-09-04 PROCEDURE — 99213 OFFICE O/P EST LOW 20 MIN: CPT | Performed by: FAMILY MEDICINE

## 2024-09-04 PROCEDURE — 87210 SMEAR WET MOUNT SALINE/INK: CPT | Performed by: FAMILY MEDICINE

## 2024-09-04 NOTE — PROGRESS NOTES
SUBJECTIVE: Wen Sanchez is a 64 year old female who complains of urinary frequency, urgency and dysuria x several days days, without flank pain, fever, chills, or abnormal vaginal discharge or bleeding.     OBJECTIVE: Appears well, in no apparent distress.  Vital signs are normal. The abdomen is soft without tenderness, guarding, mass, rebound or organomegaly. No CVA tenderness or inguinal adenopathy noted. Urine dipstick shows positive for WBC's.  Micro exam: 2-5 WBC's per HPF and few+ bacteria.     ASSESSMENT: dysuria-doubt infection, may be atrophic vaginitis    PLAN: will try estrogen cream, I reviewed the risks, benefits, and possible side effects of the medication.  The patient had an opportunity to ask any questions regarding the treatment plan. The patient was encouraged to call my office if any problems. Treatment per orders - also push fluids, may use Pyridium OTC prn. Call or return to clinic prn if these symptoms worsen or fail to improve as anticipated. sever

## 2024-09-06 LAB — URINE VOIDED CULTURE: NORMAL

## 2024-09-09 ENCOUNTER — OFFICE VISIT (OUTPATIENT)
Dept: FAMILY MEDICINE | Facility: CLINIC | Age: 64
End: 2024-09-09

## 2024-09-09 ENCOUNTER — MYC MEDICAL ADVICE (OUTPATIENT)
Dept: FAMILY MEDICINE | Facility: CLINIC | Age: 64
End: 2024-09-09

## 2024-09-09 VITALS
SYSTOLIC BLOOD PRESSURE: 134 MMHG | HEART RATE: 68 BPM | WEIGHT: 131 LBS | BODY MASS INDEX: 22.36 KG/M2 | DIASTOLIC BLOOD PRESSURE: 92 MMHG | HEIGHT: 64 IN | RESPIRATION RATE: 20 BRPM | TEMPERATURE: 98.4 F

## 2024-09-09 DIAGNOSIS — R30.0 DYSURIA: Primary | ICD-10-CM

## 2024-09-09 LAB
BACTERIA URINE: NORMAL
CLUE CELLS: NEGATIVE
PH BLDA: 4.5 [PH] (ref 3.5–4.5)
PMNS (WET PREP): NORMAL
TRICHOMONAS VAGINALS: NORMAL
YEAST CELLS: NORMAL

## 2024-09-09 PROCEDURE — 87210 SMEAR WET MOUNT SALINE/INK: CPT | Performed by: FAMILY MEDICINE

## 2024-09-09 PROCEDURE — 99213 OFFICE O/P EST LOW 20 MIN: CPT | Performed by: FAMILY MEDICINE

## 2024-09-09 RX ORDER — ESTRADIOL 0.1 MG/G
2 CREAM VAGINAL
Qty: 42.5 G | Refills: 0 | Status: SHIPPED | OUTPATIENT
Start: 2024-09-09

## 2024-09-09 NOTE — PROGRESS NOTES
SUBJECTIVE: Wen Sanchez is a 64 year old female who complains of urinary frequency, urgency and slight dysuria x 7 days, without flank pain, fever, chills, or abnormal vaginal discharge or bleeding. Pt notes pressure/bloating like feeling in low abdomen-feels like needs to urinate    Pt had normal UA/UC here 9/4/24-prescribed estrogen cream but did not fill due to cost    Pt has been using monistat x 4 days    OBJECTIVE: Appears well, in no apparent distress.  Vital signs are normal. The abdomen is soft without tenderness, guarding, mass, rebound or organomegaly. No CVA tenderness or inguinal adenopathy noted. Urine dipstick shows not done.     Wet prep : neg.     ASSESSMENT: dysuria-normal UA/UC recently and normal wet prep today-suspect atrophic vaginitis-will send in cream    PLAN: I reviewed the risks, benefits, and possible side effects of the medication.  The patient had an opportunity to ask any questions regarding the treatment plan. The patient was encouraged to call my office if any problems. Call or return to clinic prn if these symptoms worsen or fail to improve as anticipated.     Could consider imaging if pressure sensation persists

## 2024-09-09 NOTE — NURSING NOTE
Wen Sanchez is here for a recheck of her Sx. Did not start her cream as this was too expensive. Wants to know if there is an alternative.    Questioned patient about current smoking habits.  Pt. has never smoked.  PULSE regular  My Chart: active  CLASSIFICATION OF OVERWEIGHT AND OBESITY BY BMI                        Obesity Class           BMI(kg/m2)  Underweight                                    < 18.5  Normal                                         18.5-24.9  Overweight                                     25.0-29.9  OBESITY                     I                  30.0-34.9                             II                 35.0-39.9  EXTREME OBESITY             III                >40                            Patient's  BMI Body mass index is 22.49 kg/m .  http://hin.nhlbi.nih.gov/menuplanner/menu.cgi  Pre-visit planning  Immunizations - up to date  Colonoscopy - is up to date  Mammogram -   Asthma -   PHQ9 -    CLAUDIA-7 -      The patient has verbalized that it is ok to leave a detailed voice message on the patient's cell phone with results/recommendations from this visit.

## 2024-09-10 ENCOUNTER — MYC MEDICAL ADVICE (OUTPATIENT)
Dept: FAMILY MEDICINE | Facility: CLINIC | Age: 64
End: 2024-09-10

## 2024-09-10 DIAGNOSIS — N89.8 VAGINAL IRRITATION: Primary | ICD-10-CM

## 2024-09-17 ENCOUNTER — TELEPHONE (OUTPATIENT)
Dept: FAMILY MEDICINE | Facility: CLINIC | Age: 64
End: 2024-09-17

## 2024-09-17 NOTE — TELEPHONE ENCOUNTER
"Pt called stating since using the estradiol cream she has been having pelvic aching, she specifically describes it as \"aching in her ovaries\".  She is wondering if this is normal on estrogen cream?    Please advise # [phone]    Thanks, Nivia HERNANDEZ  "

## 2024-09-18 NOTE — TELEPHONE ENCOUNTER
Some (<5%) can experience some vaginal aching but would not expect this to continue.  If it is bothersome you can discontinue.  I may suggest we refer you to OB/GYN in that case.

## 2024-09-19 ENCOUNTER — OFFICE VISIT (OUTPATIENT)
Dept: FAMILY MEDICINE | Facility: CLINIC | Age: 64
End: 2024-09-19

## 2024-09-19 VITALS
TEMPERATURE: 97.6 F | RESPIRATION RATE: 20 BRPM | WEIGHT: 131 LBS | HEART RATE: 64 BPM | DIASTOLIC BLOOD PRESSURE: 74 MMHG | HEIGHT: 64 IN | SYSTOLIC BLOOD PRESSURE: 106 MMHG | BODY MASS INDEX: 22.36 KG/M2

## 2024-09-19 DIAGNOSIS — R30.0 DYSURIA: Primary | ICD-10-CM

## 2024-09-19 PROCEDURE — 99213 OFFICE O/P EST LOW 20 MIN: CPT | Performed by: FAMILY MEDICINE

## 2024-09-19 RX ORDER — ESOMEPRAZOLE MAGNESIUM 40 MG/1
40 CAPSULE, DELAYED RELEASE ORAL 2 TIMES DAILY
COMMUNITY
Start: 2024-08-30

## 2024-09-19 NOTE — PROGRESS NOTES
"SUBJECTIVE: Wen Sanchez is a 64 year old female who presents for follow up from recent visit- started estrogen cream 6 days ago- states she felt \"ovary pain\" and bloating- became worried- states thi has improved some. She states the dysuria/irritation does feel better. She states bloating better today as well    No fevers.     Patient Active Problem List   Diagnosis    Esophageal reflux    Hypoglycemia    Undiagnosed cardiac murmurs    Calculus of kidney    Encounter for counseling    ACP (advance care planning)    Symptomatic menopausal or female climacteric states       Past Medical History:   Diagnosis Date    Family history of diabetes mellitus     Mother and Father    Gastroesophageal reflux disease     Kidney stones        Family History   Problem Relation Age of Onset    Diabetes Mother     Diabetes Father     Cancer Sister         cervical    Heart Disease No family hx of     Lipids No family hx of     Breast Cancer No family hx of     Cancer - colorectal No family hx of     Colon Cancer No family hx of        Social History     Socioeconomic History    Marital status:      Spouse name: Not on file    Number of children: 3    Years of education: 12    Highest education level: Not on file   Occupational History    Occupation:      Employer: [employer]    Occupation: retired   Tobacco Use    Smoking status: Never     Passive exposure: Never    Smokeless tobacco: Never   Substance and Sexual Activity    Alcohol use: Yes     Alcohol/week: 7.0 standard drinks of alcohol     Types: 7 Standard drinks or equivalent per week     Comment: one glass wine daily    Drug use: No    Sexual activity: Yes     Partners: Male     Birth control/protection: Pill   Other Topics Concern     Service Not Asked    Blood Transfusions Not Asked    Caffeine Concern Not Asked    Occupational Exposure Not Asked    Hobby Hazards Not Asked    Sleep Concern Not Asked    Stress Concern Not Asked    Weight Concern " Not Asked    Special Diet Not Asked    Back Care Not Asked    Exercise Yes    Bike Helmet No    Seat Belt Yes    Self-Exams Yes    Parent/sibling w/ CABG, MI or angioplasty before 65F 55M? Not Asked   Social History Narrative        Functional abiltity:      Hearing imparment:No      Acitvities of daily living:Normal      Risk of falls:No      Home safety of concern:No        Do you exercise?     Yes   Times/week: 2    History of abusive relationships in past:   No    History of abusive relationships currently:    No    Do you feel emotionally and physically safe in your environment?     Yes    Do you own a gun?  No      Is the gun kept in a safe place:   NOT APPLICABLE    Do you wear a seatbelt regularly?     Yes    Do you use sun screen?     Yes         Social Determinants of Health     Financial Resource Strain: Not on file   Food Insecurity: Not on file   Transportation Needs: Not on file   Physical Activity: Not on file   Stress: Not on file   Social Connections: Not on file   Interpersonal Safety: Not on file   Housing Stability: Not on file       Past Surgical History:   Procedure Laterality Date    C MAMMOGRAM, SCREENING  2012    COLONOSCOPY  2010    COLONOSCOPY N/A 7/29/2019    Procedure: COLONOSCOPY;  Surgeon: Mike Hill MD;  Location:  GI    COLONOSCOPY N/A 6/10/2024    Procedure: Colonoscopy with polypectomy using biopsy forceps;  Surgeon: Chante Cano MD;  Location:  GI    EYE EXAM ESTABLISHED PT  2013     UGI ENDOSCOPY DIAG W OR W/O BRUSH/WASH  2001    Dr. Machuca    LAPAROSCOPIC CHOLECYSTECTOMY N/A 9/18/2019    Procedure: CHOLECYSTECTOMY, LAPAROSCOPIC;  Surgeon: Elena Rico MD;  Location:  OR    TEST NOT FOUND  10/2001    Normal GBUS except for liver hemangioma    ZZC ENLARGE BREAST  9/01       Current Outpatient Medications   Medication Sig Dispense Refill    EPINEPHrine (ANY BX GENERIC EQUIV) 0.3 MG/0.3ML injection 2-pack Inject 0.3 mLs (0.3 mg) into the muscle as  "needed for anaphylaxis May repeat one time in 5-15 minutes if response to initial dose is inadequate. 2 each 1    esomeprazole (NEXIUM) 40 MG DR capsule Take 40 mg by mouth 2 times daily.      estradiol (ESTRACE) 0.1 MG/GM vaginal cream Place 2 g vaginally twice a week. 42.5 g 0    fexofenadine (ALLEGRA) 180 MG tablet Take 1 tablet by mouth every 24 hours       No current facility-administered medications for this visit.        Allergies: Food, Sesame oil, Cephalosporins, and Nickel      Immunization History   Administered Date(s) Administered    COVID-19 MONOVALENT 12+ (Pfizer) 03/23/2021, 04/13/2021, 12/04/2021    Influenza (IIV3) PF 10/22/2009    Influenza Vaccine >6 months,quad, PF 11/18/2014, 11/05/2015    Pneumococcal 20 valent Conjugate (Prevnar 20) 05/06/2024    TD,PF 7+ (Tenivac) 01/01/1992, 06/11/2003, 05/10/2021    TDAP Vaccine (Boostrix) 05/18/2011    Td (Adult), Adsorbed 07/05/2001, 06/11/2003         /74 (BP Location: Right arm, Patient Position: Chair, Cuff Size: Adult Regular)   Pulse 64   Temp 97.6  F (36.4  C) (Temporal)   Resp 20   Ht 1.626 m (5' 4\")   Wt 59.4 kg (131 lb)   LMP 10/14/2013   BMI 22.49 kg/m        The abdomen is soft without tenderness, guarding, mass, rebound or organomegaly. Bowel sounds are normal. No CVA tenderness or inguinal adenopathy noted.     ASSESSMENT: /PLAN:   (R30.0) Dysuria  (primary encounter diagnosis)  Comment: still; suspect atrophic vaginitis- pt may have had some side effects from estrogen initiation although would not expect much-these symptoms are improving and pt is scheduled to decrease estrogen cream to twice weekly now-suspect this will settle down  Plan: continue estrogen cream, if not improved in 2 weeks refer to GYN     "

## 2024-09-19 NOTE — NURSING NOTE
"Wen Merinonichole is here for a recheck. Was working, sensation has gone away, but feels a \"period pain\" now    Questioned patient about current smoking habits.  Pt. has never smoked.  PULSE regular  My Chart: active    Body mass index is 22.49 kg/m .    Pre-visit planning  Immunizations - up to date  Colonoscopy - is up to date  Mammogram - is up to date  Asthma -   PHQ9 -    CLAUDIA-7 -      The patient has verbalized that it is ok to leave a detailed voice message on the patient's cell phone with results/recommendations from this visit.     "

## 2024-10-24 ENCOUNTER — MYC REFILL (OUTPATIENT)
Dept: FAMILY MEDICINE | Facility: CLINIC | Age: 64
End: 2024-10-24

## 2024-10-24 DIAGNOSIS — R30.0 DYSURIA: ICD-10-CM

## 2024-10-24 RX ORDER — ESTRADIOL 0.1 MG/G
2 CREAM VAGINAL
Qty: 42.5 G | Refills: 0 | Status: SHIPPED | OUTPATIENT
Start: 2024-10-24

## 2024-10-24 NOTE — TELEPHONE ENCOUNTER
Please advise, last refilled 09/09/24.    Wen Sanchez is requesting a refill of:    Pending Prescriptions:                       Disp   Refills    estradiol (ESTRACE) 0.1 MG/GM vaginal cre*42.5 g 0            Sig: Place 2 g vaginally twice a week.

## 2024-10-30 ENCOUNTER — OFFICE VISIT (OUTPATIENT)
Dept: FAMILY MEDICINE | Facility: CLINIC | Age: 64
End: 2024-10-30

## 2024-10-30 VITALS
SYSTOLIC BLOOD PRESSURE: 130 MMHG | WEIGHT: 131 LBS | BODY MASS INDEX: 22.36 KG/M2 | HEIGHT: 64 IN | DIASTOLIC BLOOD PRESSURE: 84 MMHG | HEART RATE: 68 BPM | RESPIRATION RATE: 18 BRPM | TEMPERATURE: 97.4 F

## 2024-10-30 DIAGNOSIS — M54.9 UPPER BACK PAIN ON LEFT SIDE: Primary | ICD-10-CM

## 2024-10-30 PROCEDURE — 93000 ELECTROCARDIOGRAM COMPLETE: CPT | Performed by: FAMILY MEDICINE

## 2024-10-30 PROCEDURE — 99213 OFFICE O/P EST LOW 20 MIN: CPT | Performed by: FAMILY MEDICINE

## 2024-10-30 NOTE — PROGRESS NOTES
SUBJECTIVE:   Wen Sanchez is a 64 year old female who complains of left upper back and chest  pain for 3 weeks, positional with bending or lifting, without radiation down the arms. Precipitating factors: raking leaves. Prior history of back problems: recurrent self limited episodes of low back pain in the past. There is no numbness in the arms.    Pt has had thoracic back pain in past- resolved with PT    Pain is similar to past issues with back but since she feels a slight ache in chest was worried about heart    Patient Active Problem List   Diagnosis    Esophageal reflux    Hypoglycemia    Undiagnosed cardiac murmurs    Calculus of kidney    Encounter for counseling    ACP (advance care planning)    Symptomatic menopausal or female climacteric states       Past Medical History:   Diagnosis Date    Family history of diabetes mellitus     Mother and Father    Gastroesophageal reflux disease     Kidney stones        Family History   Problem Relation Age of Onset    Diabetes Mother     Diabetes Father     Cancer Sister         cervical    Heart Disease No family hx of     Lipids No family hx of     Breast Cancer No family hx of     Cancer - colorectal No family hx of     Colon Cancer No family hx of        Social History     Socioeconomic History    Marital status:      Spouse name: Not on file    Number of children: 3    Years of education: 12    Highest education level: Not on file   Occupational History    Occupation:      Employer: [employer]    Occupation: retired   Tobacco Use    Smoking status: Never     Passive exposure: Never    Smokeless tobacco: Never   Substance and Sexual Activity    Alcohol use: Yes     Alcohol/week: 7.0 standard drinks of alcohol     Types: 7 Standard drinks or equivalent per week     Comment: one glass wine daily    Drug use: No    Sexual activity: Yes     Partners: Male     Birth control/protection: Pill   Other Topics Concern     Service Not Asked     Blood Transfusions Not Asked    Caffeine Concern Not Asked    Occupational Exposure Not Asked    Hobby Hazards Not Asked    Sleep Concern Not Asked    Stress Concern Not Asked    Weight Concern Not Asked    Special Diet Not Asked    Back Care Not Asked    Exercise Yes    Bike Helmet No    Seat Belt Yes    Self-Exams Yes    Parent/sibling w/ CABG, MI or angioplasty before 65F 55M? Not Asked   Social History Narrative        Functional abiltity:      Hearing imparment:No      Acitvities of daily living:Normal      Risk of falls:No      Home safety of concern:No        Do you exercise?     Yes   Times/week: 2    History of abusive relationships in past:   No    History of abusive relationships currently:    No    Do you feel emotionally and physically safe in your environment?     Yes    Do you own a gun?  No      Is the gun kept in a safe place:   NOT APPLICABLE    Do you wear a seatbelt regularly?     Yes    Do you use sun screen?     Yes         Social Drivers of Health     Financial Resource Strain: Not on file   Food Insecurity: Not on file   Transportation Needs: Not on file   Physical Activity: Not on file   Stress: Not on file   Social Connections: Not on file   Interpersonal Safety: Not on file   Housing Stability: Not on file       Past Surgical History:   Procedure Laterality Date    C MAMMOGRAM, SCREENING  2012    COLONOSCOPY  2010    COLONOSCOPY N/A 7/29/2019    Procedure: COLONOSCOPY;  Surgeon: Mike Hill MD;  Location:  GI    COLONOSCOPY N/A 6/10/2024    Procedure: Colonoscopy with polypectomy using biopsy forceps;  Surgeon: Chante Cnao MD;  Location:  GI    EYE EXAM ESTABLISHED PT  2013     [facility] ENDOSCOPY DIAG W OR W/O BRUSH/WASH  2001    Dr. Machuca    LAPAROSCOPIC CHOLECYSTECTOMY N/A 9/18/2019    Procedure: CHOLECYSTECTOMY, LAPAROSCOPIC;  Surgeon: Elena Rico MD;  Location:  OR    TEST NOT FOUND  10/2001    Normal GBUS except for liver hemangioma    ZZC ENLARGE BREAST   "9/01       Current Outpatient Medications   Medication Sig Dispense Refill    EPINEPHrine (ANY BX GENERIC EQUIV) 0.3 MG/0.3ML injection 2-pack Inject 0.3 mLs (0.3 mg) into the muscle as needed for anaphylaxis May repeat one time in 5-15 minutes if response to initial dose is inadequate. 2 each 1    esomeprazole (NEXIUM) 40 MG DR capsule Take 40 mg by mouth 2 times daily.      estradiol (ESTRACE) 0.1 MG/GM vaginal cream Place 2 g vaginally twice a week. 42.5 g 0    fexofenadine (ALLEGRA) 180 MG tablet Take 1 tablet by mouth every 24 hours       No current facility-administered medications for this visit.        Allergies: Food, Sesame oil, Cephalosporins, and Nickel      Immunization History   Administered Date(s) Administered    COVID-19 MONOVALENT 12+ (Pfizer) 03/23/2021, 04/13/2021, 12/04/2021    Influenza (IIV3) PF 10/22/2009    Influenza Vaccine >6 months,quad, PF 11/18/2014, 11/05/2015    Pneumococcal 20 valent Conjugate (Prevnar 20) 05/06/2024    TD,PF 7+ (Tenivac) 01/01/1992, 06/11/2003, 05/10/2021    TDAP Vaccine (Boostrix) 05/18/2011    Td (Adult), Adsorbed 07/05/2001, 06/11/2003        OBJECTIVE:  /84 (BP Location: Right arm, Patient Position: Chair, Cuff Size: Adult Regular)   Pulse 68   Temp 97.4  F (36.3  C) (Temporal)   Resp 18   Ht 1.626 m (5' 4\")   Wt 59.4 kg (131 lb)   LMP 10/14/2013   BMI 22.49 kg/m     Patient appears to be in mild to moderate pain, antalgic gait noted. Left T spine area reveals local tenderness below scapula , no mass.  Minimally Painful and reduced TS ROM noted.DTR's, motor strength and sensation normal, . X-Ray: not indicated.    EKG wnl    ASSESSMENT:   Back strain- some mild chest ache but related to positional changes and corresponds to back, no other symptoms -feel this is recurrence of her back issues from previous    PLAN:do stretches as taught at PT  Proper lifting with avoidance of heavy lifting discussed. Consider Physical Therapy and XRay studies if not " improving. Call or return to clinic prn if these symptoms worsen or fail to improve as anticipated.     patient given instructions to go to emergency department immediately if worsening of symptoms and verbalizes this understanding

## 2024-10-30 NOTE — NURSING NOTE
Wen Sanchez is here for left side ache for the past 2-3 weeks.    Questioned patient about current smoking habits.  Pt. has never smoked.  PULSE regular  My Chart: active  CLASSIFICATION OF OVERWEIGHT AND OBESITY BY BMI                        Obesity Class           BMI(kg/m2)  Underweight                                    < 18.5  Normal                                         18.5-24.9  Overweight                                     25.0-29.9  OBESITY                     I                  30.0-34.9                             II                 35.0-39.9  EXTREME OBESITY             III                >40                            Patient's  BMI Body mass index is 22.49 kg/m .  http://hin.nhlbi.nih.gov/menuplanner/menu.cgi  Pre-visit planning  Immunizations - up to date  Colonoscopy -   Mammogram -   Asthma -   PHQ9 -    CLAUDIA-7 -      The patient has verbalized that it is ok to leave a detailed voice message on the patient's cell phone with results/recommendations from this visit.

## 2024-11-06 ENCOUNTER — TRANSFERRED RECORDS (OUTPATIENT)
Dept: FAMILY MEDICINE | Facility: CLINIC | Age: 64
End: 2024-11-06

## 2024-11-10 ENCOUNTER — HOSPITAL ENCOUNTER (EMERGENCY)
Facility: CLINIC | Age: 64
Discharge: HOME | End: 2024-11-10
Attending: EMERGENCY MEDICINE | Admitting: EMERGENCY MEDICINE
Payer: MEDICARE

## 2024-11-10 ENCOUNTER — APPOINTMENT (OUTPATIENT)
Dept: CT IMAGING | Facility: CLINIC | Age: 64
End: 2024-11-10
Attending: EMERGENCY MEDICINE
Payer: MEDICARE

## 2024-11-10 VITALS
OXYGEN SATURATION: 98 % | RESPIRATION RATE: 16 BRPM | HEART RATE: 76 BPM | TEMPERATURE: 99 F | SYSTOLIC BLOOD PRESSURE: 150 MMHG | DIASTOLIC BLOOD PRESSURE: 94 MMHG

## 2024-11-10 DIAGNOSIS — R11.0 NAUSEA: ICD-10-CM

## 2024-11-10 LAB
ALBUMIN SERPL BCG-MCNC: 4.7 G/DL (ref 3.5–5.2)
ALBUMIN UR-MCNC: NEGATIVE MG/DL
ALP SERPL-CCNC: 88 U/L (ref 40–150)
ALT SERPL W P-5'-P-CCNC: 18 U/L (ref 0–50)
ANION GAP SERPL CALCULATED.3IONS-SCNC: 13 MMOL/L (ref 7–15)
APPEARANCE UR: CLEAR
AST SERPL W P-5'-P-CCNC: 24 U/L (ref 0–45)
BASOPHILS # BLD AUTO: 0 10E3/UL (ref 0–0.2)
BASOPHILS NFR BLD AUTO: 0 %
BILIRUB SERPL-MCNC: 0.4 MG/DL
BILIRUB UR QL STRIP: NEGATIVE
BUN SERPL-MCNC: 18.1 MG/DL (ref 8–23)
CALCIUM SERPL-MCNC: 9.5 MG/DL (ref 8.8–10.4)
CHLORIDE SERPL-SCNC: 105 MMOL/L (ref 98–107)
COLOR UR AUTO: ABNORMAL
CREAT SERPL-MCNC: 0.76 MG/DL (ref 0.51–0.95)
EGFRCR SERPLBLD CKD-EPI 2021: 86 ML/MIN/1.73M2
EOSINOPHIL # BLD AUTO: 0 10E3/UL (ref 0–0.7)
EOSINOPHIL NFR BLD AUTO: 0 %
ERYTHROCYTE [DISTWIDTH] IN BLOOD BY AUTOMATED COUNT: 12.5 % (ref 10–15)
GLUCOSE SERPL-MCNC: 90 MG/DL (ref 70–99)
GLUCOSE UR STRIP-MCNC: NEGATIVE MG/DL
HCO3 SERPL-SCNC: 22 MMOL/L (ref 22–29)
HCT VFR BLD AUTO: 44.2 % (ref 35–47)
HGB BLD-MCNC: 15 G/DL (ref 11.7–15.7)
HGB UR QL STRIP: NEGATIVE
HOLD SPECIMEN: NORMAL
HOLD SPECIMEN: NORMAL
IMM GRANULOCYTES # BLD: 0 10E3/UL
IMM GRANULOCYTES NFR BLD: 0 %
KETONES UR STRIP-MCNC: NEGATIVE MG/DL
LEUKOCYTE ESTERASE UR QL STRIP: ABNORMAL
LIPASE SERPL-CCNC: 31 U/L (ref 13–60)
LYMPHOCYTES # BLD AUTO: 1.1 10E3/UL (ref 0.8–5.3)
LYMPHOCYTES NFR BLD AUTO: 16 %
MCH RBC QN AUTO: 30.5 PG (ref 26.5–33)
MCHC RBC AUTO-ENTMCNC: 33.9 G/DL (ref 31.5–36.5)
MCV RBC AUTO: 90 FL (ref 78–100)
MONOCYTES # BLD AUTO: 0.3 10E3/UL (ref 0–1.3)
MONOCYTES NFR BLD AUTO: 4 %
MUCOUS THREADS #/AREA URNS LPF: PRESENT /LPF
NEUTROPHILS # BLD AUTO: 5.7 10E3/UL (ref 1.6–8.3)
NEUTROPHILS NFR BLD AUTO: 80 %
NITRATE UR QL: NEGATIVE
NRBC # BLD AUTO: 0 10E3/UL
NRBC BLD AUTO-RTO: 0 /100
PH UR STRIP: 5 [PH] (ref 5–7)
PLATELET # BLD AUTO: 285 10E3/UL (ref 150–450)
POTASSIUM SERPL-SCNC: 4.1 MMOL/L (ref 3.4–5.3)
PROT SERPL-MCNC: 7.6 G/DL (ref 6.4–8.3)
RBC # BLD AUTO: 4.92 10E6/UL (ref 3.8–5.2)
RBC URINE: 2 /HPF
SODIUM SERPL-SCNC: 140 MMOL/L (ref 135–145)
SP GR UR STRIP: 1.01 (ref 1–1.03)
SQUAMOUS EPITHELIAL: 3 /HPF
UROBILINOGEN UR STRIP-MCNC: NORMAL MG/DL
WBC # BLD AUTO: 7.1 10E3/UL (ref 4–11)
WBC URINE: 5 /HPF

## 2024-11-10 PROCEDURE — 82040 ASSAY OF SERUM ALBUMIN: CPT | Performed by: EMERGENCY MEDICINE

## 2024-11-10 PROCEDURE — 85025 COMPLETE CBC W/AUTO DIFF WBC: CPT | Performed by: EMERGENCY MEDICINE

## 2024-11-10 PROCEDURE — 81001 URINALYSIS AUTO W/SCOPE: CPT | Performed by: EMERGENCY MEDICINE

## 2024-11-10 PROCEDURE — 36415 COLL VENOUS BLD VENIPUNCTURE: CPT | Performed by: EMERGENCY MEDICINE

## 2024-11-10 PROCEDURE — 83690 ASSAY OF LIPASE: CPT | Performed by: EMERGENCY MEDICINE

## 2024-11-10 PROCEDURE — 74177 CT ABD & PELVIS W/CONTRAST: CPT | Mod: MA

## 2024-11-10 PROCEDURE — 99285 EMERGENCY DEPT VISIT HI MDM: CPT | Mod: 25

## 2024-11-10 PROCEDURE — 250N000011 HC RX IP 250 OP 636: Performed by: EMERGENCY MEDICINE

## 2024-11-10 PROCEDURE — 80053 COMPREHEN METABOLIC PANEL: CPT | Performed by: EMERGENCY MEDICINE

## 2024-11-10 RX ORDER — ONDANSETRON 4 MG/1
4 TABLET, ORALLY DISINTEGRATING ORAL ONCE
Status: COMPLETED | OUTPATIENT
Start: 2024-11-10 | End: 2024-11-10

## 2024-11-10 RX ORDER — IOPAMIDOL 755 MG/ML
500 INJECTION, SOLUTION INTRAVASCULAR ONCE
Status: COMPLETED | OUTPATIENT
Start: 2024-11-10 | End: 2024-11-10

## 2024-11-10 RX ADMIN — IOPAMIDOL 65 ML: 755 INJECTION, SOLUTION INTRAVENOUS at 14:37

## 2024-11-10 ASSESSMENT — ACTIVITIES OF DAILY LIVING (ADL)
ADLS_ACUITY_SCORE: 0

## 2024-11-10 ASSESSMENT — COLUMBIA-SUICIDE SEVERITY RATING SCALE - C-SSRS
6. HAVE YOU EVER DONE ANYTHING, STARTED TO DO ANYTHING, OR PREPARED TO DO ANYTHING TO END YOUR LIFE?: NO
1. IN THE PAST MONTH, HAVE YOU WISHED YOU WERE DEAD OR WISHED YOU COULD GO TO SLEEP AND NOT WAKE UP?: NO
2. HAVE YOU ACTUALLY HAD ANY THOUGHTS OF KILLING YOURSELF IN THE PAST MONTH?: NO

## 2024-11-10 NOTE — ED PROVIDER NOTES
Emergency Department Note      History of Present Illness     Chief Complaint   Abdominal Pain      HPI   Wen Sanchez is a 64 year old female with history of GERD who presents for abdominal tenderness and bloating. Reports ongoing issues with abdomen since may. Reports PCP diagnosed her with vaginitis in May and put her on an estrogen cream, she has since stopped taking it. Follow up with OB provided no further diagnoses, she did receive and negative vaginal ultrasound. Colonoscopy on may 10th showed one polyp and it was removed. Reports over the past 2 weeks her abdominal queasiness has moved from just her lower abdomen to her upper abdomen. Reports queasiness since may. Reports diarrhea today. Denies vomiting.  Reports taking metamucil for reflux. Reports TMJ. Reports throat is sore.     Independent Historian   None    Review of External Notes   none    Past Medical History     Medical History and Problem List   GERD  Hypoglycemia  Cardiac murmurs  Calculus of kidney    Medications   Epi  Estrace  Nexium  Allegra    Surgical History   Mammogram screening  Colonoscopy x 3  Eye exam  Endoscopy diag  Laparoscopic cholecystectomy  Enlarge breast    Physical Exam     Patient Vitals for the past 24 hrs:   BP Temp Temp src Pulse Resp SpO2   11/10/24 1600 (!) 150/94 -- -- 76 16 98 %   11/10/24 1120 (!) 160/100 99  F (37.2  C) Temporal 82 16 99 %     Physical Exam  Constitutional:       Appearance: She is well-developed.   HENT:      Right Ear: External ear normal.      Left Ear: External ear normal.      Mouth/Throat:      Mouth: Mucous membranes are moist.      Pharynx: Oropharynx is clear. No oropharyngeal exudate or posterior oropharyngeal erythema.   Eyes:      General: No scleral icterus.     Conjunctiva/sclera: Conjunctivae normal.      Pupils: Pupils are equal, round, and reactive to light.   Cardiovascular:      Rate and Rhythm: Normal rate and regular rhythm.      Heart sounds: Normal heart sounds. No  murmur heard.     No friction rub. No gallop.   Pulmonary:      Effort: Pulmonary effort is normal. No respiratory distress.      Breath sounds: Normal breath sounds. No stridor. No wheezing, rhonchi or rales.   Abdominal:      General: Bowel sounds are normal. There is no distension.      Palpations: Abdomen is soft. There is no mass.      Tenderness: There is no abdominal tenderness.   Musculoskeletal:         General: Normal range of motion.   Skin:     General: Skin is warm and dry.      Capillary Refill: Capillary refill takes less than 2 seconds.      Findings: No rash.   Neurological:      Mental Status: She is alert.           Diagnostics     Lab Results   Labs Ordered and Resulted from Time of ED Arrival to Time of ED Departure   ROUTINE UA WITH MICROSCOPIC REFLEX TO CULTURE - Abnormal       Result Value    Color Urine Straw      Appearance Urine Clear      Glucose Urine Negative      Bilirubin Urine Negative      Ketones Urine Negative      Specific Gravity Urine 1.009      Blood Urine Negative      pH Urine 5.0      Protein Albumin Urine Negative      Urobilinogen Urine Normal      Nitrite Urine Negative      Leukocyte Esterase Urine Small (*)     Mucus Urine Present (*)     RBC Urine 2      WBC Urine 5      Squamous Epithelials Urine 3 (*)    COMPREHENSIVE METABOLIC PANEL - Normal    Sodium 140      Potassium 4.1      Carbon Dioxide (CO2) 22      Anion Gap 13      Urea Nitrogen 18.1      Creatinine 0.76      GFR Estimate 86      Calcium 9.5      Chloride 105      Glucose 90      Alkaline Phosphatase 88      AST 24      ALT 18      Protein Total 7.6      Albumin 4.7      Bilirubin Total 0.4     LIPASE - Normal    Lipase 31     CBC WITH PLATELETS AND DIFFERENTIAL    WBC Count 7.1      RBC Count 4.92      Hemoglobin 15.0      Hematocrit 44.2      MCV 90      MCH 30.5      MCHC 33.9      RDW 12.5      Platelet Count 285      % Neutrophils 80      % Lymphocytes 16      % Monocytes 4      % Eosinophils 0       % Basophils 0      % Immature Granulocytes 0      NRBCs per 100 WBC 0      Absolute Neutrophils 5.7      Absolute Lymphocytes 1.1      Absolute Monocytes 0.3      Absolute Eosinophils 0.0      Absolute Basophils 0.0      Absolute Immature Granulocytes 0.0      Absolute NRBCs 0.0         Imaging   CT Abdomen Pelvis w Contrast   Final Result   IMPRESSION:    1.  No acute abnormality to account for symptoms of abdominal tenderness and bloating.   2.  Colonic diverticulosis with no diverticulitis nor appendicitis.   3.  Cholecystectomy.          Independent Interpretation   None    ED Course      Medications Administered   Medications   ondansetron (ZOFRAN ODT) ODT tab 4 mg (4 mg Oral Not Given 11/10/24 1126)   sodium chloride (PF) 0.9% PF flush 100 mL (56 mLs Intravenous $Given 11/10/24 1437)   iopamidol (ISOVUE-370) solution 500 mL (65 mLs Intravenous $Given 11/10/24 1437)       Procedures   Procedures     Discussion of Management   None    ED Course   ED Course as of 11/10/24 1602   Sun Nov 10, 2024   1308 I obtained history and examined the patient as noted above     1512 I rechecked the patient and explained findings.     1557 I rechecked the patient and explained findings.         Additional Documentation  None    Medical Decision Making / Diagnosis     CMS Diagnoses: None    MIPS       None    MDM   Wen Sanchez is a 64 year old female who presents with above symptoms.  She does not have distinct abdominal pain but is having nausea for this.  Time.  Given her symptom and duration, I did do a CT to rule out any intra-abdominal pathology.  It appears that her evaluation here is within normal limits.  No acute causes were found.  She has a GI appointment 3 days from now.  She states that she will follow-up with her GI doctor to discuss further workup.  She has had a normal colonoscopy therefore I think likely EGD to further evaluate her symptoms.  She is comfortable with the treatment plan.  She declines  Zofran to go home with.  Return precaution provided.  Patient discharged in stable condition.    Disposition   The patient was discharged.     Diagnosis     ICD-10-CM    1. Nausea  R11.0                Scribe Disclosure:  I, Gunnar Walker, am serving as a scribe at 1:08 PM on 11/10/2024 to document services personally performed by Rosetta Johnson MD based on my observations and the provider's statements to me.        Rosetta Johnson MD  11/10/24 1848

## 2024-11-10 NOTE — ED TRIAGE NOTES
Abdominal bloating and discomfort. Pain is generalized, but occasionally localized in the upper abdomen. Not worse after eating. Ongoing for the past couple weeks but has become more persistent in the past few days. Intermittent nausea, but no vomiting. Declined zofran in triage. No diarrhea or constipation. No urinary symptoms.

## 2024-11-14 ENCOUNTER — TRANSFERRED RECORDS (OUTPATIENT)
Dept: FAMILY MEDICINE | Facility: CLINIC | Age: 64
End: 2024-11-14

## 2024-11-25 ENCOUNTER — OFFICE VISIT (OUTPATIENT)
Dept: FAMILY MEDICINE | Facility: CLINIC | Age: 64
End: 2024-11-25

## 2024-11-25 VITALS
RESPIRATION RATE: 18 BRPM | WEIGHT: 131 LBS | SYSTOLIC BLOOD PRESSURE: 130 MMHG | BODY MASS INDEX: 22.36 KG/M2 | HEART RATE: 72 BPM | DIASTOLIC BLOOD PRESSURE: 84 MMHG | TEMPERATURE: 98 F | HEIGHT: 64 IN

## 2024-11-25 DIAGNOSIS — F41.0 PANIC DISORDER WITHOUT AGORAPHOBIA: Primary | ICD-10-CM

## 2024-11-25 DIAGNOSIS — R11.0 NAUSEA: ICD-10-CM

## 2024-11-25 PROCEDURE — G2211 COMPLEX E/M VISIT ADD ON: HCPCS | Performed by: FAMILY MEDICINE

## 2024-11-25 PROCEDURE — 99213 OFFICE O/P EST LOW 20 MIN: CPT | Performed by: FAMILY MEDICINE

## 2024-11-25 RX ORDER — ALPRAZOLAM 1 MG/1
1 TABLET ORAL 3 TIMES DAILY PRN
Qty: 10 TABLET | Refills: 0 | Status: CANCELLED | OUTPATIENT
Start: 2024-11-25

## 2024-11-25 RX ORDER — LORAZEPAM 0.5 MG/1
0.5 TABLET ORAL EVERY 6 HOURS PRN
Qty: 20 TABLET | Refills: 0 | Status: SHIPPED | OUTPATIENT
Start: 2024-11-25

## 2024-11-25 NOTE — NURSING NOTE
Wen Sanchez is here for a medication refill for her alprazolam. Has not had this filled for a few years.    Questioned patient about current smoking habits.  Pt. has never smoked.  PULSE regular  My Chart: active  CLASSIFICATION OF OVERWEIGHT AND OBESITY BY BMI                        Obesity Class           BMI(kg/m2)  Underweight                                    < 18.5  Normal                                         18.5-24.9  Overweight                                     25.0-29.9  OBESITY                     I                  30.0-34.9                             II                 35.0-39.9  EXTREME OBESITY             III                >40                            Patient's  BMI Body mass index is 22.49 kg/m .  http://hin.nhlbi.nih.gov/menuplanner/menu.cgi  Pre-visit planning  Immunizations - up to date  Colonoscopy - is up to date  Mammogram - is up to date  Asthma -   PHQ9 -    CLAUDIA-7 -      The patient has verbalized that it is ok to leave a detailed voice message on the patient's cell phone with results/recommendations from this visit.

## 2024-11-25 NOTE — PROGRESS NOTES
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- stress likelym te cause of her recent GI complaints as well, work up negative otherwise    Pt as ised rare xanax  (#10 in last 4 years)- pt does nto want to try another selective serotonin reuptake inhibitor or SNRI    We agree to small rx ativan (feel safer than xanax), OK for rare use, if using more often will need alternative plan including daily meds    Continue to work on healthy diet and exercise, discussed healthy habits     Work to offload caregiving responsibilities as able  - LORazepam (ATIVAN) 0.5 MG tablet  Dispense: 20 tablet; Refill: 0    Nausea  Improved with amityrptiline, continue current medications at current doses   - amitriptyline (ELAVIL) 25 MG tablet              FUTURE APPOINTMENTS:       - Follow-up visit in 6 mo  Regular exercise    No follow-ups on file.    Angelic Mcneal is a 64 year old, presenting for the following health issues:  Recheck Medication    HPI     Pt saw GI and GYN for abd discomfort and nausea- has imaging and colonoscopy- normal exams, started on amitryptiline  Anxiety - has been on paxil, zoloft, and effexor in the past but did not tolerate    Pt has used xanax for panic -last rx 2020 and just ran out    How are you doing with your anxiety since your last visit? Worsened   Are you having other symptoms that might be associated with anxiety? Yes:  rare panic  Have you had a significant life event? 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 No  Do you have any concerns with your use of alcohol or other drugs? No  pt drinks one small glass wine daily    Social History     Tobacco Use    Smoking status: Never     Passive exposure: Never    Smokeless tobacco: Never   Substance Use Topics    Alcohol use: Yes      "Alcohol/week: 7.0 standard drinks of alcohol     Types: 7 Standard drinks or equivalent per week     Comment: one glass wine daily    Drug use: No         11/9/2015    10:49 AM   CLAUDIA-7 SCORE   Total Score 7          No data to display                   No data to display                  11/9/2015    10:49 AM   CLAUDIA-7    1. Feeling nervous, anxious, or on edge 2   2. Not being able to stop or control worrying 3   3. Worrying too much about different things 0   4. Trouble relaxing 1   5. Being so restless that it is hard to sit still 1   6. Becoming easily annoyed or irritable 0   7. Feeling afraid, as if something awful might happen 0   CLAUDIA-7 Total Score 7   If you checked any problems, how difficult have they made it for you to do your work, take care of things at home, or get along with other people? Somewhat difficult     How many servings of fruits and vegetables do you eat daily?  2-3  On average, how many sweetened beverages do you drink each day (Examples: soda, juice, sweet tea, etc.  Do NOT count diet or artificially sweetened beverages)?   1  How many days per week do you exercise enough to make your heart beat faster? 6  How many minutes a day do you exercise enough to make your heart beat faster? 30 - 60  How many days per week do you miss taking your medication? 0        Review of Systems  Constitutional, HEENT, cardiovascular, pulmonary, gi and gu systems are negative, except as otherwise noted.      Objective    /84 (BP Location: Left arm, Patient Position: Chair, Cuff Size: Adult Regular)   Pulse 72   Temp 98  F (36.7  C) (Temporal)   Resp 18   Ht 1.626 m (5' 4\")   Wt 59.4 kg (131 lb)   LMP 10/14/2013   BMI 22.49 kg/m    Body mass index is 22.49 kg/m .  Physical Exam   GENERAL: alert and no distress  RESP: lungs clear to auscultation - no rales, rhonchi or wheezes  CV: regular rate and rhythm, normal S1 S2, no S3 or S4, no murmur, click or rub, no peripheral edema  PSYCH: mentation " appears normal, affect normal/bright    Reviewed GI notes        Signed Electronically by: Mike Beltran MD

## 2025-01-02 ENCOUNTER — OFFICE VISIT (OUTPATIENT)
Dept: FAMILY MEDICINE | Facility: CLINIC | Age: 65
End: 2025-01-02

## 2025-01-02 VITALS
BODY MASS INDEX: 22.36 KG/M2 | HEIGHT: 64 IN | RESPIRATION RATE: 18 BRPM | DIASTOLIC BLOOD PRESSURE: 78 MMHG | WEIGHT: 131 LBS | SYSTOLIC BLOOD PRESSURE: 114 MMHG | HEART RATE: 68 BPM | TEMPERATURE: 97.2 F

## 2025-01-02 DIAGNOSIS — F41.9 ANXIETY: ICD-10-CM

## 2025-01-02 DIAGNOSIS — R11.0 NAUSEA: Primary | ICD-10-CM

## 2025-01-02 DIAGNOSIS — R10.9 ABDOMINAL DISCOMFORT: ICD-10-CM

## 2025-01-02 NOTE — NURSING NOTE
"Still having the sensation in her pelvis/abdomen,now gets \"queasy\" at times. Wondering if coming from her back.    Questioned patient about current smoking habits.  Pt. has never smoked.  PULSE regular  My Chart: active  CLASSIFICATION OF OVERWEIGHT AND OBESITY BY BMI                        Obesity Class           BMI(kg/m2)  Underweight                                    < 18.5  Normal                                         18.5-24.9  Overweight                                     25.0-29.9  OBESITY                     I                  30.0-34.9                             II                 35.0-39.9  EXTREME OBESITY             III                >40                            Patient's  BMI Body mass index is 22.49 kg/m .  http://hin.nhlbi.nih.gov/menuplanner/menu.cgi  Pre-visit planning  Immunizations - up to date  Colonoscopy - is up to date  Mammogram - is up to date  Asthma -   PHQ9 -    CLAUDIA-7 -      The patient has verbalized that it is ok to leave a detailed voice message on the patient's cell phone with results/recommendations from this visit.     "

## 2025-01-02 NOTE — PROGRESS NOTES
SUBJECTIVE:  Wen Sanchez, a 64 year old female scheduled an appointment to discuss the following issues:     Nausea  Abdominal discomfort  Pt has had ongoing issues with intermittent diffuse abd discomfort and nausea for months-has seen GI and OB/GYN-had colonoscopy, CT abd and pelvis, pelvic US and labs all normal    Pt was tried on PPI and amitriptyline-may have helped some    Pt wonders if this may be due to her hx of upper back pain- states she occasionally still has back pains    Pt is under a lot of stress related to husbands health and recent moving of her sister- pt has used SSRIs in past- had some side effects but did not some improvements in mood then      Medical, social, surgical, and family histories reviewed.    Patient Active Problem List   Diagnosis    Esophageal reflux    Hypoglycemia    Undiagnosed cardiac murmurs    Calculus of kidney    Encounter for counseling    ACP (advance care planning)    Symptomatic menopausal or female climacteric states       Past Medical History:   Diagnosis Date    Family history of diabetes mellitus     Mother and Father    Gastroesophageal reflux disease     Kidney stones        Family History   Problem Relation Age of Onset    Diabetes Mother     Diabetes Father     Cancer Sister         cervical    Heart Disease No family hx of     Lipids No family hx of     Breast Cancer No family hx of     Cancer - colorectal No family hx of     Colon Cancer No family hx of        Social History     Socioeconomic History    Marital status:      Spouse name: Not on file    Number of children: 3    Years of education: 12    Highest education level: Not on file   Occupational History    Occupation:      Employer: [employer]    Occupation: retired   Tobacco Use    Smoking status: Never     Passive exposure: Never    Smokeless tobacco: Never   Substance and Sexual Activity    Alcohol use: Yes     Alcohol/week: 7.0 standard drinks of alcohol     Types: 7 Standard  drinks or equivalent per week     Comment: one glass wine daily    Drug use: No    Sexual activity: Yes     Partners: Male     Birth control/protection: Pill   Other Topics Concern     Service Not Asked    Blood Transfusions Not Asked    Caffeine Concern Not Asked    Occupational Exposure Not Asked    Hobby Hazards Not Asked    Sleep Concern Not Asked    Stress Concern Not Asked    Weight Concern Not Asked    Special Diet Not Asked    Back Care Not Asked    Exercise Yes    Bike Helmet No    Seat Belt Yes    Self-Exams Yes    Parent/sibling w/ CABG, MI or angioplasty before 65F 55M? Not Asked   Social History Narrative        Functional abiltity:      Hearing imparment:No      Acitvities of daily living:Normal      Risk of falls:No      Home safety of concern:No        Do you exercise?     Yes   Times/week: 2    History of abusive relationships in past:   No    History of abusive relationships currently:    No    Do you feel emotionally and physically safe in your environment?     Yes    Do you own a gun?  No      Is the gun kept in a safe place:   NOT APPLICABLE    Do you wear a seatbelt regularly?     Yes    Do you use sun screen?     Yes         Social Drivers of Health     Financial Resource Strain: Not on file   Food Insecurity: Not on file   Transportation Needs: Not on file   Physical Activity: Not on file   Stress: Not on file   Social Connections: Not on file   Interpersonal Safety: Not on file   Housing Stability: Not on file       Past Surgical History:   Procedure Laterality Date    C MAMMOGRAM, SCREENING  2012    COLONOSCOPY  2010    COLONOSCOPY N/A 7/29/2019    Procedure: COLONOSCOPY;  Surgeon: Mike Hill MD;  Location:  GI    COLONOSCOPY N/A 6/10/2024    Procedure: Colonoscopy with polypectomy using biopsy forceps;  Surgeon: Chante Cano MD;  Location:  GI    EYE EXAM ESTABLISHED PT  2013     UGI ENDOSCOPY DIAG W OR W/O BRUSH/WASH  2001    Dr. Machuca    LAPAROSCOPIC  CHOLECYSTECTOMY N/A 9/18/2019    Procedure: CHOLECYSTECTOMY, LAPAROSCOPIC;  Surgeon: Elena Rico MD;  Location: [facility] OR    TEST NOT FOUND  10/2001    Normal GBUS except for liver hemangioma    ZZC ENLARGE BREAST  9/01       Current Outpatient Medications   Medication Sig Dispense Refill    amitriptyline (ELAVIL) 25 MG tablet Take 1 tablet (25 mg) by mouth at bedtime.      EPINEPHrine (ANY BX GENERIC EQUIV) 0.3 MG/0.3ML injection 2-pack Inject 0.3 mLs (0.3 mg) into the muscle as needed for anaphylaxis May repeat one time in 5-15 minutes if response to initial dose is inadequate. 2 each 1    esomeprazole (NEXIUM) 40 MG DR capsule Take 40 mg by mouth 2 times daily.      estradiol (ESTRACE) 0.1 MG/GM vaginal cream Place 2 g vaginally twice a week. 42.5 g 0    fexofenadine (ALLEGRA) 180 MG tablet Take 1 tablet by mouth every 24 hours      LORazepam (ATIVAN) 0.5 MG tablet Take 1 tablet (0.5 mg) by mouth every 6 hours as needed for anxiety. 20 tablet 0     No current facility-administered medications for this visit.        Allergies: Food, Sesame oil, Cephalosporins, and Nickel      Immunization History   Administered Date(s) Administered    COVID-19 MONOVALENT 12+ (Pfizer) 03/23/2021, 04/13/2021, 12/04/2021    Influenza (IIV3) PF 10/22/2009    Influenza Vaccine >6 months,quad, PF 11/18/2014, 11/05/2015    Pneumococcal 20 valent Conjugate (Prevnar 20) 05/06/2024    TD,PF 7+ (Tenivac) 01/01/1992, 06/11/2003, 05/10/2021    TDAP Vaccine (Boostrix) 05/18/2011    Td (Adult), Adsorbed 07/05/2001, 06/11/2003        ROS:  CONSTITUTIONAL: NEGATIVE for fever, chills  EYES: NEGATIVE for vision changes   RESP: NEGATIVE for significant cough or SOB  CV: NEGATIVE for chest pain, palpitations   : NEGATIVE for frequency, dysuria, or hematuria  MUSCULOSKELETAL: NEGATIVE for significant arthralgias or myalgia  NEURO: NEGATIVE for weakness, dizziness or paresthesias or headache    OBJECTIVE:  /78 (BP Location: Left arm,  "Patient Position: Chair, Cuff Size: Adult Regular)   Pulse 68   Temp 97.2  F (36.2  C) (Temporal)   Resp 18   Ht 1.626 m (5' 4\")   Wt 59.4 kg (131 lb)   LMP 10/14/2013   BMI 22.49 kg/m    EXAM:  GENERAL APPEARANCE: healthy, alert and no distress  EYES: EOMI,  PERRL  HENT: ear canals and TM's normal and nose and mouth without ulcers or lesions  RESP: lungs clear to auscultation - no rales, rhonchi or wheezes  CV: regular rates and rhythm, normal S1 S2, no S3 or S4 and no murmur, click or rub -  ABDOMEN:  soft, nontender, no HSM or masses and bowel sounds normal  PSYCH: mentation appears normal and anxious    ASSESSMENT/PLAN:  (R11.0) Nausea  (primary encounter diagnosis)  Comment: discussed comprehensive negative work up, GI had offered EGD but her symptoms do not sound overly suggestive of upper GI problems beyond GERD, do not feel related to muscular back issues in past, suspect this is more anxiety driven and pt seems to agree, discussed options for anxiety  Plan: pt declines meds or therapy today but feels she can work on healthy habits and stress reduction, f/u if not improving or worsening     (R10.9) Abdominal discomfort  Comment: as above, suspect functional issues such as IBS  Plan: as above   "

## 2025-04-10 ENCOUNTER — HOSPITAL ENCOUNTER (EMERGENCY)
Facility: CLINIC | Age: 65
Discharge: HOME | End: 2025-04-10
Attending: EMERGENCY MEDICINE
Payer: MEDICARE

## 2025-04-10 VITALS
DIASTOLIC BLOOD PRESSURE: 85 MMHG | WEIGHT: 141.76 LBS | OXYGEN SATURATION: 98 % | BODY MASS INDEX: 24.33 KG/M2 | TEMPERATURE: 97.2 F | RESPIRATION RATE: 18 BRPM | SYSTOLIC BLOOD PRESSURE: 155 MMHG | HEART RATE: 71 BPM

## 2025-04-10 DIAGNOSIS — R10.13 EPIGASTRIC PAIN: ICD-10-CM

## 2025-04-10 LAB
ALBUMIN SERPL BCG-MCNC: 4.5 G/DL (ref 3.5–5.2)
ALP SERPL-CCNC: 81 U/L (ref 40–150)
ALT SERPL W P-5'-P-CCNC: 23 U/L (ref 0–50)
ANION GAP SERPL CALCULATED.3IONS-SCNC: 13 MMOL/L (ref 7–15)
AST SERPL W P-5'-P-CCNC: 26 U/L (ref 0–45)
ATRIAL RATE - MUSE: 65 BPM
BASOPHILS # BLD AUTO: 0 10E3/UL (ref 0–0.2)
BASOPHILS NFR BLD AUTO: 0 %
BILIRUB SERPL-MCNC: 0.3 MG/DL
BUN SERPL-MCNC: 21.5 MG/DL (ref 8–23)
CALCIUM SERPL-MCNC: 9.2 MG/DL (ref 8.8–10.4)
CHLORIDE SERPL-SCNC: 105 MMOL/L (ref 98–107)
CREAT SERPL-MCNC: 0.83 MG/DL (ref 0.51–0.95)
DIASTOLIC BLOOD PRESSURE - MUSE: NORMAL MMHG
EGFRCR SERPLBLD CKD-EPI 2021: 77 ML/MIN/1.73M2
EOSINOPHIL # BLD AUTO: 0.1 10E3/UL (ref 0–0.7)
EOSINOPHIL NFR BLD AUTO: 2 %
ERYTHROCYTE [DISTWIDTH] IN BLOOD BY AUTOMATED COUNT: 12.5 % (ref 10–15)
GLUCOSE SERPL-MCNC: 104 MG/DL (ref 70–99)
HCO3 SERPL-SCNC: 22 MMOL/L (ref 22–29)
HCT VFR BLD AUTO: 42.9 % (ref 35–47)
HGB BLD-MCNC: 14.5 G/DL (ref 11.7–15.7)
IMM GRANULOCYTES # BLD: 0 10E3/UL
IMM GRANULOCYTES NFR BLD: 0 %
INTERPRETATION ECG - MUSE: NORMAL
LIPASE SERPL-CCNC: 44 U/L (ref 13–60)
LYMPHOCYTES # BLD AUTO: 2.4 10E3/UL (ref 0.8–5.3)
LYMPHOCYTES NFR BLD AUTO: 44 %
MCH RBC QN AUTO: 29.7 PG (ref 26.5–33)
MCHC RBC AUTO-ENTMCNC: 33.8 G/DL (ref 31.5–36.5)
MCV RBC AUTO: 88 FL (ref 78–100)
MONOCYTES # BLD AUTO: 0.4 10E3/UL (ref 0–1.3)
MONOCYTES NFR BLD AUTO: 7 %
NEUTROPHILS # BLD AUTO: 2.5 10E3/UL (ref 1.6–8.3)
NEUTROPHILS NFR BLD AUTO: 47 %
NRBC # BLD AUTO: 0 10E3/UL
NRBC BLD AUTO-RTO: 0 /100
P AXIS - MUSE: 55 DEGREES
PLATELET # BLD AUTO: 270 10E3/UL (ref 150–450)
POTASSIUM SERPL-SCNC: 4 MMOL/L (ref 3.4–5.3)
PR INTERVAL - MUSE: 168 MS
PROT SERPL-MCNC: 7.1 G/DL (ref 6.4–8.3)
QRS DURATION - MUSE: 90 MS
QT - MUSE: 414 MS
QTC - MUSE: 430 MS
R AXIS - MUSE: -5 DEGREES
RBC # BLD AUTO: 4.89 10E6/UL (ref 3.8–5.2)
SODIUM SERPL-SCNC: 140 MMOL/L (ref 135–145)
SYSTOLIC BLOOD PRESSURE - MUSE: NORMAL MMHG
T AXIS - MUSE: 56 DEGREES
TROPONIN T SERPL HS-MCNC: <6 NG/L
VENTRICULAR RATE- MUSE: 65 BPM
WBC # BLD AUTO: 5.4 10E3/UL (ref 4–11)

## 2025-04-10 PROCEDURE — 83690 ASSAY OF LIPASE: CPT | Performed by: EMERGENCY MEDICINE

## 2025-04-10 PROCEDURE — 93005 ELECTROCARDIOGRAM TRACING: CPT

## 2025-04-10 PROCEDURE — 36415 COLL VENOUS BLD VENIPUNCTURE: CPT | Performed by: EMERGENCY MEDICINE

## 2025-04-10 PROCEDURE — 85004 AUTOMATED DIFF WBC COUNT: CPT | Performed by: EMERGENCY MEDICINE

## 2025-04-10 PROCEDURE — 84484 ASSAY OF TROPONIN QUANT: CPT | Performed by: EMERGENCY MEDICINE

## 2025-04-10 PROCEDURE — 82040 ASSAY OF SERUM ALBUMIN: CPT | Performed by: EMERGENCY MEDICINE

## 2025-04-10 PROCEDURE — 99284 EMERGENCY DEPT VISIT MOD MDM: CPT

## 2025-04-10 ASSESSMENT — ACTIVITIES OF DAILY LIVING (ADL): ADLS_ACUITY_SCORE: 41

## 2025-04-10 ASSESSMENT — COLUMBIA-SUICIDE SEVERITY RATING SCALE - C-SSRS
2. HAVE YOU ACTUALLY HAD ANY THOUGHTS OF KILLING YOURSELF IN THE PAST MONTH?: NO
6. HAVE YOU EVER DONE ANYTHING, STARTED TO DO ANYTHING, OR PREPARED TO DO ANYTHING TO END YOUR LIFE?: NO
1. IN THE PAST MONTH, HAVE YOU WISHED YOU WERE DEAD OR WISHED YOU COULD GO TO SLEEP AND NOT WAKE UP?: NO

## 2025-04-10 NOTE — ED PROVIDER NOTES
Emergency Department Note      History of Present Illness     Chief Complaint   Chest Pain      HPI   Wen Sanchez is a 64 year old female seen who presents to the emergency department with epigastric pain that radiates around both sides into her back that woke her up from sleep tonight.  Pain lasted about 3 minutes and then resolved.  Pain improved with stretching and movement.  She woke up her  and they came to the emergency department for further evaluation.  She denies any nausea, vomiting, fevers or chills.  Denies any diarrhea or urinary symptoms.  Denies any chest pain or shortness of breath.  She has not had pain like this before in the past.  She notes that this pain is dissimilar to pain that she has had with her acid reflux.    Past Medical History     Medical History and Problem List   Past Medical History:   Diagnosis Date    Family history of diabetes mellitus     Gastroesophageal reflux disease     Kidney stones        Medications   amitriptyline (ELAVIL) 25 MG tablet  EPINEPHrine (ANY BX GENERIC EQUIV) 0.3 MG/0.3ML injection 2-pack  esomeprazole (NEXIUM) 40 MG DR capsule  estradiol (ESTRACE) 0.1 MG/GM vaginal cream  fexofenadine (ALLEGRA) 180 MG tablet  LORazepam (ATIVAN) 0.5 MG tablet        Surgical History   Past Surgical History:   Procedure Laterality Date    C MAMMOGRAM, SCREENING  2012    COLONOSCOPY  2010    COLONOSCOPY N/A 7/29/2019    Procedure: COLONOSCOPY;  Surgeon: Mike Hill MD;  Location:  GI    COLONOSCOPY N/A 6/10/2024    Procedure: Colonoscopy with polypectomy using biopsy forceps;  Surgeon: Chante Cano MD;  Location:  GI    EYE EXAM ESTABLISHED PT  2013     UGI ENDOSCOPY DIAG W OR W/O BRUSH/WASH  2001    Dr. Machuca    LAPAROSCOPIC CHOLECYSTECTOMY N/A 9/18/2019    Procedure: CHOLECYSTECTOMY, LAPAROSCOPIC;  Surgeon: Elena Rico MD;  Location:  OR    TEST NOT FOUND  10/2001    Normal GBUS except for liver hemangioma    ZZC ENLARGE BREAST   9/01       Physical Exam     Patient Vitals for the past 24 hrs:   BP Temp Temp src Pulse Resp SpO2 Weight   04/10/25 0207 (!) 160/93 97.2  F (36.2  C) Temporal 72 20 97 % 64.3 kg (141 lb 12.1 oz)     Physical Exam  General: Patient is awake, alert and interactive  Head: The scalp, face, and head appear normal  Eyes: The pupils are equal, round, and reactive to light. Conjunctivae and sclerae are normal  Neck: Normal range of motion.  CV: Regular rate and rhythm. Peripheral pulses including radial pulses are symmetric.   Resp: Lungs are clear without wheezes or rales. No respiratory distress.   GI: Abdomen is soft, no rigidity, guarding, or rebound. No distension. No tenderness to palpation in any quadrant.    MS: Chest wall is non tender to palpation. No asymmetric leg swelling, calf or thigh tenderness.    Skin: No rash or lesions noted. Normal capillary refill noted  Neuro: Speech is normal and fluent. Face is symmetric. Moving all extremities.   Psych:  Normal affect.  Appropriate interactions.     Diagnostics     Lab Results   Labs Ordered and Resulted from Time of ED Arrival to Time of ED Departure   COMPREHENSIVE METABOLIC PANEL - Abnormal       Result Value    Sodium 140      Potassium 4.0      Carbon Dioxide (CO2) 22      Anion Gap 13      Urea Nitrogen 21.5      Creatinine 0.83      GFR Estimate 77      Calcium 9.2      Chloride 105      Glucose 104 (*)     Alkaline Phosphatase 81      AST 26      ALT 23      Protein Total 7.1      Albumin 4.5      Bilirubin Total 0.3     LIPASE - Normal    Lipase 44     TROPONIN T, HIGH SENSITIVITY - Normal    Troponin T, High Sensitivity <6     CBC WITH PLATELETS AND DIFFERENTIAL    WBC Count 5.4      RBC Count 4.89      Hemoglobin 14.5      Hematocrit 42.9      MCV 88      MCH 29.7      MCHC 33.8      RDW 12.5      Platelet Count 270      % Neutrophils 47      % Lymphocytes 44      % Monocytes 7      % Eosinophils 2      % Basophils 0      % Immature Granulocytes 0       NRBCs per 100 WBC 0      Absolute Neutrophils 2.5      Absolute Lymphocytes 2.4      Absolute Monocytes 0.4      Absolute Eosinophils 0.1      Absolute Basophils 0.0      Absolute Immature Granulocytes 0.0      Absolute NRBCs 0.0         Imaging   No orders to display     EKG   ECG results from 04/10/25   EKG 12 lead     Value    Systolic Blood Pressure     Diastolic Blood Pressure     Ventricular Rate 65    Atrial Rate 65    TN Interval 168    QRS Duration 90        QTc 430    P Axis 55    R AXIS -5    T Axis 56    Interpretation ECG      Sinus rhythm  RSR' or QR pattern in V1 suggests right ventricular conduction delay  Septal infarct (cited on or before 12-Nov-2021)  Abnormal ECG  When compared with ECG of 24-Sep-2023 21:38,  Questionable change in initial forces of Anterior leads        Medical Decision Making / Diagnosis       KAUSHAL Sanchez is a 64 year old female who presents emergency department with brief episode of epigastric pain that resolved prior to her presentation.  In the emergency department here she is hypertensive but otherwise hemodynamically stable with normal vital signs.  She is afebrile and oxygenating well on room air.  Patient has a benign abdominal exam without any significant guarding, rebound or rigidity.  Low concern for significant intra-abdominal pathology.  No indication for advanced imaging.  Lipase and LFTs were normal.  No evidence of hepatitis or pancreatitis.  Cardiac equivalent was considered.  EKG shows no signs of ischemia nor dysrhythmia.  Troponin was undetectably low.  Very low suspicion for acute coronary syndrome, significant myocarditis or pericarditis.  Patient is comfortable with going home.  Recommended close follow-up with her primary care team and return to the emergency room with any new or worsening symptoms.    Disposition   The patient was discharged.     Diagnosis     ICD-10-CM    1. Epigastric pain  R10.13          Charles Rivera MD       Charles Rivera MD  04/10/25 6327

## 2025-04-10 NOTE — ED TRIAGE NOTES
Pt arrives with  for an episode of epigastric pain that radiated to midsternal region tonight around 0115. The pain lasted approx 3 minutes. Pain woke pt up from sleep. Has had this pain previously but not had workup. No cardiac history. No gallbladder. No strenuous activity yesterday. VSS     Triage Assessment (Adult)       Row Name 04/10/25 0208          Triage Assessment    Airway WDL WDL        Respiratory WDL    Respiratory WDL WDL        Skin Circulation/Temperature WDL    Skin Circulation/Temperature WDL WDL        Cardiac WDL    Cardiac WDL chest pain        Chest Pain Assessment    Chest Pain Location epigastric;midsternal

## 2025-04-18 ENCOUNTER — APPOINTMENT (OUTPATIENT)
Dept: GENERAL RADIOLOGY | Facility: CLINIC | Age: 65
End: 2025-04-18
Attending: EMERGENCY MEDICINE
Payer: MEDICARE

## 2025-04-18 ENCOUNTER — HOSPITAL ENCOUNTER (EMERGENCY)
Facility: CLINIC | Age: 65
Discharge: HOME | End: 2025-04-18
Attending: EMERGENCY MEDICINE | Admitting: EMERGENCY MEDICINE
Payer: MEDICARE

## 2025-04-18 VITALS
BODY MASS INDEX: 23.84 KG/M2 | RESPIRATION RATE: 18 BRPM | TEMPERATURE: 98.1 F | HEART RATE: 89 BPM | OXYGEN SATURATION: 96 % | DIASTOLIC BLOOD PRESSURE: 105 MMHG | SYSTOLIC BLOOD PRESSURE: 172 MMHG | WEIGHT: 138.89 LBS

## 2025-04-18 DIAGNOSIS — R07.89 ATYPICAL CHEST PAIN: ICD-10-CM

## 2025-04-18 LAB
ANION GAP SERPL CALCULATED.3IONS-SCNC: 13 MMOL/L (ref 7–15)
BASOPHILS # BLD AUTO: 0 10E3/UL (ref 0–0.2)
BASOPHILS NFR BLD AUTO: 0 %
BUN SERPL-MCNC: 21.4 MG/DL (ref 8–23)
CALCIUM SERPL-MCNC: 9.4 MG/DL (ref 8.8–10.4)
CHLORIDE SERPL-SCNC: 104 MMOL/L (ref 98–107)
CREAT SERPL-MCNC: 0.82 MG/DL (ref 0.51–0.95)
EGFRCR SERPLBLD CKD-EPI 2021: 78 ML/MIN/1.73M2
EOSINOPHIL # BLD AUTO: 0 10E3/UL (ref 0–0.7)
EOSINOPHIL NFR BLD AUTO: 0 %
ERYTHROCYTE [DISTWIDTH] IN BLOOD BY AUTOMATED COUNT: 12.1 % (ref 10–15)
GLUCOSE SERPL-MCNC: 129 MG/DL (ref 70–99)
HCO3 SERPL-SCNC: 23 MMOL/L (ref 22–29)
HCT VFR BLD AUTO: 41.4 % (ref 35–47)
HGB BLD-MCNC: 14.3 G/DL (ref 11.7–15.7)
HOLD SPECIMEN: NORMAL
HOLD SPECIMEN: NORMAL
IMM GRANULOCYTES # BLD: 0 10E3/UL
IMM GRANULOCYTES NFR BLD: 0 %
LYMPHOCYTES # BLD AUTO: 1.1 10E3/UL (ref 0.8–5.3)
LYMPHOCYTES NFR BLD AUTO: 17 %
MCH RBC QN AUTO: 30.2 PG (ref 26.5–33)
MCHC RBC AUTO-ENTMCNC: 34.5 G/DL (ref 31.5–36.5)
MCV RBC AUTO: 87 FL (ref 78–100)
MONOCYTES # BLD AUTO: 0.2 10E3/UL (ref 0–1.3)
MONOCYTES NFR BLD AUTO: 3 %
NEUTROPHILS # BLD AUTO: 5 10E3/UL (ref 1.6–8.3)
NEUTROPHILS NFR BLD AUTO: 79 %
NRBC # BLD AUTO: 0 10E3/UL
NRBC BLD AUTO-RTO: 0 /100
PLATELET # BLD AUTO: 259 10E3/UL (ref 150–450)
POTASSIUM SERPL-SCNC: 4 MMOL/L (ref 3.4–5.3)
RBC # BLD AUTO: 4.74 10E6/UL (ref 3.8–5.2)
SODIUM SERPL-SCNC: 140 MMOL/L (ref 135–145)
TROPONIN T SERPL HS-MCNC: <6 NG/L
WBC # BLD AUTO: 6.3 10E3/UL (ref 4–11)

## 2025-04-18 PROCEDURE — 36415 COLL VENOUS BLD VENIPUNCTURE: CPT | Performed by: EMERGENCY MEDICINE

## 2025-04-18 PROCEDURE — 85004 AUTOMATED DIFF WBC COUNT: CPT | Performed by: EMERGENCY MEDICINE

## 2025-04-18 PROCEDURE — 84484 ASSAY OF TROPONIN QUANT: CPT | Performed by: EMERGENCY MEDICINE

## 2025-04-18 PROCEDURE — 93005 ELECTROCARDIOGRAM TRACING: CPT

## 2025-04-18 PROCEDURE — 80048 BASIC METABOLIC PNL TOTAL CA: CPT | Performed by: EMERGENCY MEDICINE

## 2025-04-18 PROCEDURE — 71046 X-RAY EXAM CHEST 2 VIEWS: CPT

## 2025-04-18 PROCEDURE — 99285 EMERGENCY DEPT VISIT HI MDM: CPT | Mod: 25

## 2025-04-18 RX ORDER — SUCRALFATE ORAL 1 G/10ML
1 SUSPENSION ORAL 4 TIMES DAILY
Qty: 414 ML | Refills: 0 | Status: SHIPPED | OUTPATIENT
Start: 2025-04-18

## 2025-04-18 ASSESSMENT — COLUMBIA-SUICIDE SEVERITY RATING SCALE - C-SSRS
1. IN THE PAST MONTH, HAVE YOU WISHED YOU WERE DEAD OR WISHED YOU COULD GO TO SLEEP AND NOT WAKE UP?: NO
2. HAVE YOU ACTUALLY HAD ANY THOUGHTS OF KILLING YOURSELF IN THE PAST MONTH?: NO
6. HAVE YOU EVER DONE ANYTHING, STARTED TO DO ANYTHING, OR PREPARED TO DO ANYTHING TO END YOUR LIFE?: NO

## 2025-04-18 ASSESSMENT — ACTIVITIES OF DAILY LIVING (ADL)
ADLS_ACUITY_SCORE: 41
ADLS_ACUITY_SCORE: 41

## 2025-04-18 NOTE — DISCHARGE INSTRUCTIONS
We have done a workup for chest pain that is negative.  As we have discussed it is common not to have a test answer for chest pain.  If you have severe chest pain that is intolerable or if you develop chest pain that is worse with activity or exertion return to the emergency room for reassessment.  Please follow-up with your regular doctor discuss further workup we are trialing Carafate due to the possibility of reflux or esophageal pain.  Thanks for your patience today.

## 2025-04-18 NOTE — ED TRIAGE NOTES
Pt arrives with sudden onset of epigastric pain that started about 1 hour pta, pt staets she was walking around shopping when it started. Pain was a sharp stabbing sensation that radiated into her left shoulder. Pt states similar episode last week and was seen in the ER with negative workup. Denies any cardiac hx, endorses acid reflux issues in the past.      Triage Assessment (Adult)       Row Name 04/18/25 1242          Triage Assessment    Airway WDL WDL        Respiratory WDL    Respiratory WDL WDL        Cardiac WDL    Cardiac WDL X;all        Chest Pain Assessment    Chest Pain Location epigastric     Associated Signs/Symptoms hypertension

## 2025-04-18 NOTE — ED PROVIDER NOTES
Emergency Department Note      History of Present Illness     Chief Complaint   Chest Pain      HPI   Wen Sanchez is a 64 year old female patient is a 64-year-old female who presents with chest heaviness.  Patient is a 64-year-old female with a history of reflux.  Denies active medical problems.  Was seen about a week ago for upper abdominal pain and a CT scan that was negative.  Patient states she had another episode of pain he describes it as a pain in the left chest.  Patient thought it was may be reflux and took some Gaviscon.  That she did notice it radiated to the left shoulder.  She normally exercises on a treadmill daily without chest pain or shortness of breath with activity but due to the discomfort rating to the shoulder presents to the emergency room for assessment.    Independent Historian   None    Review of External Notes       Past Medical History     Medical History and Problem List   Past Medical History:   Diagnosis Date    Family history of diabetes mellitus     Gastroesophageal reflux disease     Kidney stones        Medications   amitriptyline (ELAVIL) 25 MG tablet  EPINEPHrine (ANY BX GENERIC EQUIV) 0.3 MG/0.3ML injection 2-pack  esomeprazole (NEXIUM) 40 MG DR capsule  estradiol (ESTRACE) 0.1 MG/GM vaginal cream  fexofenadine (ALLEGRA) 180 MG tablet  LORazepam (ATIVAN) 0.5 MG tablet        Surgical History   Past Surgical History:   Procedure Laterality Date    C MAMMOGRAM, SCREENING  2012    COLONOSCOPY  2010    COLONOSCOPY N/A 7/29/2019    Procedure: COLONOSCOPY;  Surgeon: iMke Hill MD;  Location:  GI    COLONOSCOPY N/A 6/10/2024    Procedure: Colonoscopy with polypectomy using biopsy forceps;  Surgeon: Chante Cano MD;  Location:  GI    EYE EXAM ESTABLISHED PT  2013     [facility] ENDOSCOPY DIAG W OR W/O BRUSH/WASH  2001    Dr. Machuca    LAPAROSCOPIC CHOLECYSTECTOMY N/A 9/18/2019    Procedure: CHOLECYSTECTOMY, LAPAROSCOPIC;  Surgeon: Elena Rico MD;   Location: [facility] OR    TEST NOT FOUND  10/2001    Normal GBUS except for liver hemangioma    ZZC ENLARGE BREAST  9/01       Physical Exam     Patient Vitals for the past 24 hrs:   BP Temp Temp src Pulse Resp SpO2 Weight   04/18/25 1243 (!) 172/105 98.1  F (36.7  C) Oral 89 18 96 % 63 kg (138 lb 14.2 oz)     Physical Exam  Vitals and nursing note reviewed.   Cardiovascular:      Rate and Rhythm: Normal rate and regular rhythm.      Heart sounds: Normal heart sounds.   Pulmonary:      Effort: Pulmonary effort is normal.      Breath sounds: Normal breath sounds.   Abdominal:      General: Bowel sounds are normal.      Palpations: Abdomen is soft.   Musculoskeletal:         General: Normal range of motion.      Right lower leg: No edema.      Left lower leg: No edema.   Skin:     General: Skin is warm.      Capillary Refill: Capillary refill takes less than 2 seconds.   Neurological:      General: No focal deficit present.      Mental Status: She is alert.   Psychiatric:         Mood and Affect: Mood normal.           Diagnostics     Lab Results   Labs Ordered and Resulted from Time of ED Arrival to Time of ED Departure   BASIC METABOLIC PANEL - Abnormal       Result Value    Sodium 140      Potassium 4.0      Chloride 104      Carbon Dioxide (CO2) 23      Anion Gap 13      Urea Nitrogen 21.4      Creatinine 0.82      GFR Estimate 78      Calcium 9.4      Glucose 129 (*)    TROPONIN T, HIGH SENSITIVITY - Normal    Troponin T, High Sensitivity <6     CBC WITH PLATELETS AND DIFFERENTIAL    WBC Count 6.3      RBC Count 4.74      Hemoglobin 14.3      Hematocrit 41.4      MCV 87      MCH 30.2      MCHC 34.5      RDW 12.1      Platelet Count 259      % Neutrophils 79      % Lymphocytes 17      % Monocytes 3      % Eosinophils 0      % Basophils 0      % Immature Granulocytes 0      NRBCs per 100 WBC 0      Absolute Neutrophils 5.0      Absolute Lymphocytes 1.1      Absolute Monocytes 0.2      Absolute Eosinophils 0.0       Absolute Basophils 0.0      Absolute Immature Granulocytes 0.0      Absolute NRBCs 0.0         Imaging   Chest XR,  PA & LAT   Final Result   IMPRESSION: No acute cardiopulmonary process. Bilateral breast prosthesis.          EKG   ECG taken at 1232, ECG read at 1300  Rate 88 bpm. VA interval 154 ms. QRS duration 94 ms. QT/QTc 372/450 ms. normal sinus rhythm incomplete right bundle branch block no prior seen no ST or T wave abnormalities.no change 10/2024        Independent Interpretation   None    ED Course      Medications Administered   Medications - No data to display    Procedures   Procedures     Discussion of Management   None    ED Course        Additional Documentation  None    Medical Decision Making / Diagnosis     CMS Diagnoses: None    MIPS       None    MDM   Wen Sanchez is a 64 year old female presents with vague left-sided chest pain.  The chest pain is not exertional.  Well-appearing.  Blood pressure slightly elevated but evaluation in the ER is completely normal with normal troponin at 0.  Timeframe in less than 0 exam does not require repeats.  EKG is normal.  Describing fleeting episodes of nonexertional chest pain.  Offered reassurance and follow-up with primary care and was discharged home in stable condition.  Due to ED boarding and overcrowding patient was seen evaluated and discharged from triage.    Disposition   The patient was discharged.     Diagnosis     ICD-10-CM    1. Atypical chest pain  R07.89            Discharge Medications   New Prescriptions    No medications on file         MD Santi Akers Brian Samuel, MD  04/20/25 7808

## 2025-04-21 LAB
ATRIAL RATE - MUSE: 88 BPM
DIASTOLIC BLOOD PRESSURE - MUSE: NORMAL MMHG
INTERPRETATION ECG - MUSE: NORMAL
P AXIS - MUSE: 26 DEGREES
PR INTERVAL - MUSE: 154 MS
QRS DURATION - MUSE: 94 MS
QT - MUSE: 372 MS
QTC - MUSE: 450 MS
R AXIS - MUSE: 3 DEGREES
SYSTOLIC BLOOD PRESSURE - MUSE: NORMAL MMHG
T AXIS - MUSE: 49 DEGREES
VENTRICULAR RATE- MUSE: 88 BPM

## 2025-04-23 ENCOUNTER — OFFICE VISIT (OUTPATIENT)
Dept: FAMILY MEDICINE | Facility: CLINIC | Age: 65
End: 2025-04-23

## 2025-04-23 VITALS
OXYGEN SATURATION: 96 % | BODY MASS INDEX: 23.69 KG/M2 | HEART RATE: 66 BPM | SYSTOLIC BLOOD PRESSURE: 138 MMHG | WEIGHT: 138 LBS | DIASTOLIC BLOOD PRESSURE: 80 MMHG | TEMPERATURE: 97.5 F

## 2025-04-23 DIAGNOSIS — F41.9 ANXIETY: ICD-10-CM

## 2025-04-23 DIAGNOSIS — R07.89 ATYPICAL CHEST PAIN: Primary | ICD-10-CM

## 2025-04-23 PROCEDURE — 96127 BRIEF EMOTIONAL/BEHAV ASSMT: CPT | Performed by: FAMILY MEDICINE

## 2025-04-23 PROCEDURE — G2211 COMPLEX E/M VISIT ADD ON: HCPCS | Performed by: FAMILY MEDICINE

## 2025-04-23 PROCEDURE — 99214 OFFICE O/P EST MOD 30 MIN: CPT | Performed by: FAMILY MEDICINE

## 2025-04-23 RX ORDER — FAMOTIDINE 20 MG/1
20 TABLET, FILM COATED ORAL 2 TIMES DAILY PRN
COMMUNITY

## 2025-04-23 ASSESSMENT — ANXIETY QUESTIONNAIRES
GAD7 TOTAL SCORE: 6
6. BECOMING EASILY ANNOYED OR IRRITABLE: NOT AT ALL
7. FEELING AFRAID AS IF SOMETHING AWFUL MIGHT HAPPEN: SEVERAL DAYS
GAD7 TOTAL SCORE: 6
2. NOT BEING ABLE TO STOP OR CONTROL WORRYING: SEVERAL DAYS
IF YOU CHECKED OFF ANY PROBLEMS ON THIS QUESTIONNAIRE, HOW DIFFICULT HAVE THESE PROBLEMS MADE IT FOR YOU TO DO YOUR WORK, TAKE CARE OF THINGS AT HOME, OR GET ALONG WITH OTHER PEOPLE: NOT DIFFICULT AT ALL
5. BEING SO RESTLESS THAT IT IS HARD TO SIT STILL: SEVERAL DAYS
1. FEELING NERVOUS, ANXIOUS, OR ON EDGE: SEVERAL DAYS
3. WORRYING TOO MUCH ABOUT DIFFERENT THINGS: SEVERAL DAYS

## 2025-04-23 ASSESSMENT — PATIENT HEALTH QUESTIONNAIRE - PHQ9
SUM OF ALL RESPONSES TO PHQ QUESTIONS 1-9: 0
5. POOR APPETITE OR OVEREATING: SEVERAL DAYS

## 2025-04-23 NOTE — PROGRESS NOTES
Assessment & Plan     Atypical chest pain  Work up negative for cardiac damage, discussed with pt that very low likelihood of cardiac etiology- suspect GERD and anxiety    Pt is taking pepcid on and off in addition to nexium, states heartburn controlled,    Will treat anxiety as below    patient given instructions to go to emergency department immediately if worsening of symptoms and verbalizes this understanding     Anxiety  We discussed that here symptoms are most consistent with poorly controlled anxiety-pt does have stress related to husbands cognitive decline    discussed options including healthy habits (exercise, sleep hygiene, healthy diet, meditation/stress reduction), therapy, medications including but not limited to SSRI's     Pt good about habits, not a big fan of daily meds- we agree to try therapy  - Adult Mental Health  Referral - To a [facility] (Use POS/Location)          MED REC REQUIRED  Post Medication Reconciliation Status: discharge medications reconciled, continue medications without change        Subjective   Elizabet is a 64 year old, presenting for the following health issues:  No chief complaint on file.    HPI        ED/UC Followup:    Facility:  [facility]  Date of visit: 4/18/25 and 4/10/25  Reason for visit: Chest Pain  Current Status: Feels it coming on, gets sharper then starts relaxing and goes away, takes 2-3 min. Happened today, felt like it was going to come on but took deep breaths and went away.  Anxiety - pt has not been using her lorazepam she has for panic- tried one recently and felt better, pt hs not tolerated sertraline last time she checked, did OK on effexor some years ago  How are you doing with your anxiety since your last visit? Worsened   Are you having other symptoms that might be associated with anxiety? Yes:  atypical chest pain, in ED twice recently and normal cardiac evaluation  Have you had a significant life event? No   Are  you feeling depressed? No  Do you have any concerns with your use of alcohol or other drugs? No    Social History     Tobacco Use    Smoking status: Never     Passive exposure: Never    Smokeless tobacco: Never   Substance Use Topics    Alcohol use: Yes     Alcohol/week: 7.0 standard drinks of alcohol     Types: 7 Standard drinks or equivalent per week     Comment: one glass wine daily    Drug use: No         11/9/2015    10:49 AM   CLAUDIA-7 SCORE   Total Score 7          No data to display                  4/23/2025     2:00 PM   Last PHQ-9   1.  Little interest or pleasure in doing things 0   2.  Feeling down, depressed, or hopeless 0   3.  Trouble falling or staying asleep, or sleeping too much 0   4.  Feeling tired or having little energy 0   5.  Poor appetite or overeating 0   6.  Feeling bad about yourself 0   7.  Trouble concentrating 0   8.  Moving slowly or restless 0   Q9: Thoughts of better off dead/self-harm past 2 weeks 0   PHQ-9 Total Score 0   Difficulty at work, home, or with people Not difficult at all         4/23/2025     2:00 PM   CLAUDIA-7    1. Feeling nervous, anxious, or on edge 1   2. Not being able to stop or control worrying 1   3. Worrying too much about different things 1   4. Trouble relaxing 1   5. Being so restless that it is hard to sit still 1   6. Becoming easily annoyed or irritable 0   7. Feeling afraid, as if something awful might happen 1   CLAUDIA-7 Total Score 6   If you checked any problems, how difficult have they made it for you to do your work, take care of things at home, or get along with other people? Not difficult at all     How many servings of fruits and vegetables do you eat daily?  2-3  On average, how many sweetened beverages do you drink each day (Examples: soda, juice, sweet tea, etc.  Do NOT count diet or artificially sweetened beverages)?   1  How many days per week do you exercise enough to make your heart beat faster? 7  How many minutes a day do you exercise enough  to make your heart beat faster? 30 - 60  How many days per week do you miss taking your medication? 0      Review of Systems  Constitutional, HEENT, cardiovascular, pulmonary, gi and gu systems are negative, except as otherwise noted.      Objective    LMP 10/14/2013   There is no height or weight on file to calculate BMI.  Physical Exam   GENERAL: alert and no distress  NECK: no adenopathy, no asymmetry, masses, or scars  RESP: lungs clear to auscultation - no rales, rhonchi or wheezes  CV: regular rate and rhythm, normal S1 S2, no S3 or S4, no murmur, click or rub, no peripheral edema  PSYCH: mentation appears normal, affect normal/bright    Admission on 04/18/2025, Discharged on 04/18/2025   Component Date Value Ref Range Status    Sodium 04/18/2025 140  135 - 145 mmol/L Final    Potassium 04/18/2025 4.0  3.4 - 5.3 mmol/L Final    Chloride 04/18/2025 104  98 - 107 mmol/L Final    Carbon Dioxide (CO2) 04/18/2025 23  22 - 29 mmol/L Final    Anion Gap 04/18/2025 13  7 - 15 mmol/L Final    Urea Nitrogen 04/18/2025 21.4  8.0 - 23.0 mg/dL Final    Creatinine 04/18/2025 0.82  0.51 - 0.95 mg/dL Final    GFR Estimate 04/18/2025 78  >60 mL/min/1.73m2 Final    eGFR calculated using 2021 CKD-EPI equation.    Calcium 04/18/2025 9.4  8.8 - 10.4 mg/dL Final    Glucose 04/18/2025 129 (H)  70 - 99 mg/dL Final    Troponin T, High Sensitivity 04/18/2025 <6  <=14 ng/L Final    Either a High Sensitivity Troponin T baseline (0 hours) value = 100 ng/L, or an increase in High Sensitivity Troponin T = 7 ng/L at 2 hours compared to 0 hours (2-0 hours), suggests myocardial injury, and urgent clinical attention is required.    If the 2-0 hours increase is <7 ng/L, a High Sensitivity Troponin T result above gender-specific reference ranges warrants further evaluation.   Recommendations for further evaluation include correlation with clinical decision-making tool (e.g., HEART), a 3rd High Sensitivity Troponin T test 2 hours after the 2nd (a  20% change from baseline would represent concern), admission for observation, close PCC/cardiology follow-up, or urgent outpatient provocative testing.    Hold Specimen 04/18/2025 JIC   Final    Hold Specimen 04/18/2025 JI   Final    WBC Count 04/18/2025 6.3  4.0 - 11.0 10e3/uL Final    RBC Count 04/18/2025 4.74  3.80 - 5.20 10e6/uL Final    Hemoglobin 04/18/2025 14.3  11.7 - 15.7 g/dL Final    Hematocrit 04/18/2025 41.4  35.0 - 47.0 % Final    MCV 04/18/2025 87  78 - 100 fL Final    MCH 04/18/2025 30.2  26.5 - 33.0 pg Final    MCHC 04/18/2025 34.5  31.5 - 36.5 g/dL Final    RDW 04/18/2025 12.1  10.0 - 15.0 % Final    Platelet Count 04/18/2025 259  150 - 450 10e3/uL Final    % Neutrophils 04/18/2025 79  % Final    % Lymphocytes 04/18/2025 17  % Final    % Monocytes 04/18/2025 3  % Final    % Eosinophils 04/18/2025 0  % Final    % Basophils 04/18/2025 0  % Final    % Immature Granulocytes 04/18/2025 0  % Final    NRBCs per 100 WBC 04/18/2025 0  <1 /100 Final    Absolute Neutrophils 04/18/2025 5.0  1.6 - 8.3 10e3/uL Final    Absolute Lymphocytes 04/18/2025 1.1  0.8 - 5.3 10e3/uL Final    Absolute Monocytes 04/18/2025 0.2  0.0 - 1.3 10e3/uL Final    Absolute Eosinophils 04/18/2025 0.0  0.0 - 0.7 10e3/uL Final    Absolute Basophils 04/18/2025 0.0  0.0 - 0.2 10e3/uL Final    Absolute Immature Granulocytes 04/18/2025 0.0  <=0.4 10e3/uL Final    Absolute NRBCs 04/18/2025 0.0  10e3/uL Final    Ventricular Rate 04/18/2025 88  BPM Final    Atrial Rate 04/18/2025 88  BPM Final    MS Interval 04/18/2025 154  ms Final    QRS Duration 04/18/2025 94  ms Final    QT 04/18/2025 372  ms Final    QTc 04/18/2025 450  ms Final    P Axis 04/18/2025 26  degrees Final    R AXIS 04/18/2025 3  degrees Final    T Axis 04/18/2025 49  degrees Final    Interpretation ECG 04/18/2025    Final                    Value:Sinus rhythm  Incomplete right bundle branch block  Septal infarct (cited on or before 12-Nov-2021)  Abnormal ECG  When compared  with ECG of 10-Apr-2025 02:03,  No significant change was found  Confirmed by - EMERGENCY ROOM, PHYSICIAN ([id number]),  UNIQUE BUSTAMANTE (Favian) on 4/21/2025 7:06:18 AM         Reviewed labs from ED x 2    Signed Electronically by: Mike Beltran MD

## 2025-04-23 NOTE — NURSING NOTE
Chief Complaint   Patient presents with    ER F/U     [facility], 4/18/25 and 4/10/25, Chest pain. Feels it coming on, gets shaper then starts relaxing and goes away, takes 2-3 min. Happened today, felt like it was going to come on but took deep breaths and went away.     Pre-visit Screening:  Immunizations:  up to date  Colonoscopy:  is up to date  Mammogram: is up to date  Asthma Action Test/Plan:  na  PHQ9:  na  GAD7:  na  Questioned patient about current smoking habits Pt. has never smoked.  Ok to leave detailed message on voice mail for today's visit only yes, phone # [phone] (home)

## 2025-05-12 ENCOUNTER — HOSPITAL ENCOUNTER (OUTPATIENT)
Dept: MAMMOGRAPHY | Facility: CLINIC | Age: 65
Discharge: HOME | End: 2025-05-12
Attending: FAMILY MEDICINE | Admitting: FAMILY MEDICINE
Payer: MEDICARE

## 2025-05-12 DIAGNOSIS — Z12.31 SCREENING MAMMOGRAM, ENCOUNTER FOR: ICD-10-CM

## 2025-05-12 PROCEDURE — 77063 BREAST TOMOSYNTHESIS BI: CPT

## 2025-05-15 ENCOUNTER — MYC MEDICAL ADVICE (OUTPATIENT)
Dept: FAMILY MEDICINE | Facility: CLINIC | Age: 65
End: 2025-05-15

## 2025-05-19 ENCOUNTER — OFFICE VISIT (OUTPATIENT)
Dept: FAMILY MEDICINE | Facility: CLINIC | Age: 65
End: 2025-05-19

## 2025-05-19 VITALS
TEMPERATURE: 98 F | SYSTOLIC BLOOD PRESSURE: 124 MMHG | BODY MASS INDEX: 23.56 KG/M2 | WEIGHT: 138 LBS | HEART RATE: 64 BPM | DIASTOLIC BLOOD PRESSURE: 76 MMHG | RESPIRATION RATE: 20 BRPM | HEIGHT: 64 IN

## 2025-05-19 DIAGNOSIS — E78.2 MIXED HYPERLIPIDEMIA: ICD-10-CM

## 2025-05-19 DIAGNOSIS — F41.9 ANXIETY: ICD-10-CM

## 2025-05-19 DIAGNOSIS — Z00.00 ROUTINE GENERAL MEDICAL EXAMINATION AT A HEALTH CARE FACILITY: Primary | ICD-10-CM

## 2025-05-19 DIAGNOSIS — Z00.00 ENCOUNTER FOR ANNUAL WELLNESS EXAM IN MEDICARE PATIENT: ICD-10-CM

## 2025-05-19 LAB
CHOLEST SERPL-MCNC: 271 MG/DL (ref 0–199)
CHOLEST/HDLC SERPL: 3 {RATIO} (ref 0–5)
HDLC SERPL-MCNC: 100 MG/DL (ref 40–150)
LDLC SERPL CALC-MCNC: 154 MG/DL (ref 0–129)
TRIGL SERPL-MCNC: 86 MG/DL (ref 0–149)

## 2025-05-19 PROCEDURE — 80061 LIPID PANEL: CPT | Performed by: FAMILY MEDICINE

## 2025-05-19 PROCEDURE — 36415 COLL VENOUS BLD VENIPUNCTURE: CPT | Performed by: FAMILY MEDICINE

## 2025-05-19 PROCEDURE — 99397 PER PM REEVAL EST PAT 65+ YR: CPT | Mod: 25 | Performed by: FAMILY MEDICINE

## 2025-05-19 PROCEDURE — G0439 PPPS, SUBSEQ VISIT: HCPCS | Performed by: FAMILY MEDICINE

## 2025-05-19 RX ORDER — AMITRIPTYLINE HYDROCHLORIDE 10 MG/1
10 TABLET ORAL AT BEDTIME
COMMUNITY
Start: 2025-04-28

## 2025-05-19 NOTE — PROGRESS NOTES
Wen Sanchez is a 64 year old female who presents for Medicare Annual Wellness Visit.    Current providers caring for this patient include:  Patient Care Team:  Mike Beltran MD as PCP - General (Family Medicine)  Mike Beltran MD as Assigned PCP    Complete Medical and Social history reviewed with patient, outlined below.    Patient Active Problem List   Diagnosis    Esophageal reflux    Hypoglycemia    Undiagnosed cardiac murmurs    Calculus of kidney    Encounter for counseling    ACP (advance care planning)    Symptomatic menopausal or female climacteric states       Past Medical History:   Diagnosis Date    Family history of diabetes mellitus     Mother and Father    Gastroesophageal reflux disease     Kidney stones        Past Surgical History:   Procedure Laterality Date    C MAMMOGRAM, SCREENING  2012    COLONOSCOPY  2010    COLONOSCOPY N/A 7/29/2019    Procedure: COLONOSCOPY;  Surgeon: Mike Hill MD;  Location:  GI    COLONOSCOPY N/A 6/10/2024    Procedure: Colonoscopy with polypectomy using biopsy forceps;  Surgeon: Chante Cano MD;  Location:  GI    EYE EXAM ESTABLISHED PT  2013     [facility] ENDOSCOPY DIAG W OR W/O BRUSH/WASH  2001    Dr. Machuca    LAPAROSCOPIC CHOLECYSTECTOMY N/A 9/18/2019    Procedure: CHOLECYSTECTOMY, LAPAROSCOPIC;  Surgeon: Elena Rico MD;  Location:  OR    TEST NOT FOUND  10/2001    Normal GBUS except for liver hemangioma    ZZC ENLARGE BREAST  9/01       Family History   Problem Relation Age of Onset    Diabetes Mother     Diabetes Father     Cancer Sister         cervical    Heart Disease No family hx of     Lipids No family hx of     Breast Cancer No family hx of     Cancer - colorectal No family hx of     Colon Cancer No family hx of        Social History     Tobacco Use    Smoking status: Never     Passive exposure: Never    Smokeless tobacco: Never   Substance Use Topics    Alcohol use: Yes     Alcohol/week: 7.0 standard  "drinks of alcohol     Types: 7 Standard drinks or equivalent per week     Comment: one glass wine daily       Diet: regular, low salt/low fat  Physical Activity: patient exercises 4-6 times weekly  Depression Screen:    Over the past 2 weeks, patient has felt down, depressed, or hopeless:  No    Over the past 2 weeks, patient has felt little interest or pleasure in doing things: No    Functional ability/Safety screen:  Up and go test (able to get up and walk longer than 30 seconds): Passed  Patient needs assistance with: nothing  Patient's home has the following possible safety concerns: none identified  Patient has concerns about her hearing:  No  Cognitive Screen  Patient repeats three objects (ball, flag, tree)      Clock drawing test:   NORMAL  Recalls three objects after 3 minutes (ball,flag,tree):                                                                                               recalls 3 objects (3 points)    Physical Exam:  /76 (BP Location: Right arm, Patient Position: Chair, Cuff Size: Adult Regular)   Pulse 64   Temp 98  F (36.7  C) (Temporal)   Resp 20   Ht 1.626 m (5' 4\")   Wt 62.6 kg (138 lb)   LMP 10/14/2013   BMI 23.69 kg/m     Body mass index is 23.69 kg/m .              End of Life Planning:   Patient currently has an advanced directive: No.  I have verified the patient's ablity to prepare an advanced directive/make health care decisions.  Literature was provided to assist patient in preparing an advanced directive.    Education/Counseling:   Based on review of the above information, the following items were addressed:      Appropriate preventive services were discussed with this patient, including applicable screening as appropriate for cardiovascular disease, diabetes, osteopenia/osteoporosis, and glaucoma.  As appropriate for age/gender, discussed screening for colorectal cancer, prostate cancer, breast cancer, and cervical cancer.   Checklist reviewing preventive " services available has been given to the patient.             SUBJECTIVE:   CC: Wen Sanchez is an 64 year old woman who presents for preventive health visit.       Patient has been advised of split billing requirements and indicates understanding: Yes  Healthy Habits:  Do you get at least three servings of calcium containing foods daily (dairy, green leafy vegetables, etc.)? yes  Amount of exercise or daily activities, outside of work: 6 day(s) per week  Problems taking medications regularly No  Medication side effects: No  Have you had an eye exam in the past two years? yes  Do you see a dentist twice per year? yes  Do you have sleep apnea, excessive snoring or daytime drowsiness?no          Today's PHQ-2 Score:       5/19/2025     8:08 AM 1/2/2025     3:20 PM   PHQ-2 ( 1999 [employer])   Q1: Little interest or pleasure in doing things 0 0   Q2: Feeling down, depressed or hopeless 0 0   PHQ-2 Score 0 0       Abuse: Current or Past(Physical, Sexual or Emotional)- No  Do you feel safe in your environment? Yes        Social History     Tobacco Use    Smoking status: Never     Passive exposure: Never    Smokeless tobacco: Never   Substance Use Topics    Alcohol use: Yes     Alcohol/week: 7.0 standard drinks of alcohol     Types: 7 Standard drinks or equivalent per week     Comment: one glass wine daily     If you drink alcohol do you typically have >3 drinks per day or >7 drinks per week? No                     Reviewed orders with patient.  Reviewed health maintenance and updated orders accordingly - Yes  BP Readings from Last 3 Encounters:   05/19/25 124/76   04/23/25 138/80   04/18/25 (!) 172/105    Wt Readings from Last 3 Encounters:   05/19/25 62.6 kg (138 lb)   04/23/25 62.6 kg (138 lb)   04/18/25 63 kg (138 lb 14.2 oz)                  Patient Active Problem List   Diagnosis    Esophageal reflux    Hypoglycemia    Undiagnosed cardiac murmurs    Calculus of kidney    Encounter for counseling    ACP (advance  care planning)    Symptomatic menopausal or female climacteric states     Past Surgical History:   Procedure Laterality Date    C MAMMOGRAM, SCREENING  2012    COLONOSCOPY  2010    COLONOSCOPY N/A 7/29/2019    Procedure: COLONOSCOPY;  Surgeon: Mike Hill MD;  Location:  GI    COLONOSCOPY N/A 6/10/2024    Procedure: Colonoscopy with polypectomy using biopsy forceps;  Surgeon: Chante Cano MD;  Location:  GI    EYE EXAM ESTABLISHED PT  2013     [facility] ENDOSCOPY DIAG W OR W/O BRUSH/WASH  2001    Dr. Machuca    LAPAROSCOPIC CHOLECYSTECTOMY N/A 9/18/2019    Procedure: CHOLECYSTECTOMY, LAPAROSCOPIC;  Surgeon: Elena Rico MD;  Location:  OR    TEST NOT FOUND  10/2001    Normal GBUS except for liver hemangioma    ZZC ENLARGE BREAST  9/01       Social History     Tobacco Use    Smoking status: Never     Passive exposure: Never    Smokeless tobacco: Never   Substance Use Topics    Alcohol use: Yes     Alcohol/week: 7.0 standard drinks of alcohol     Types: 7 Standard drinks or equivalent per week     Comment: one glass wine daily     Family History   Problem Relation Age of Onset    Diabetes Mother     Diabetes Father     Cancer Sister         cervical    Heart Disease No family hx of     Lipids No family hx of     Breast Cancer No family hx of     Cancer - colorectal No family hx of     Colon Cancer No family hx of          Current Outpatient Medications   Medication Sig Dispense Refill    amitriptyline (ELAVIL) 10 MG tablet Take 10 mg by mouth at bedtime.      EPINEPHrine (ANY BX GENERIC EQUIV) 0.3 MG/0.3ML injection 2-pack Inject 0.3 mLs (0.3 mg) into the muscle as needed for anaphylaxis May repeat one time in 5-15 minutes if response to initial dose is inadequate. 2 each 1    esomeprazole (NEXIUM) 40 MG DR capsule Take 40 mg by mouth 2 times daily.      estradiol (ESTRACE) 0.1 MG/GM vaginal cream Place 2 g vaginally twice a week. 42.5 g 0    famotidine (PEPCID) 20 MG tablet Take 20 mg by mouth 2  times daily as needed.      fexofenadine (ALLEGRA) 180 MG tablet Take 1 tablet by mouth every 24 hours      LORazepam (ATIVAN) 0.5 MG tablet Take 1 tablet (0.5 mg) by mouth every 6 hours as needed for anxiety. 20 tablet 0     Allergies   Allergen Reactions    Food Anaphylaxis     Cinnamon Toast Crunch Cereal    Sesame Oil Anaphylaxis    Cephalosporins Rash     Reaction was during a surgery, MD thinks the reaction was to the cephalosporin rather than an anesthetic agent  Reaction was severe rash and itching    Nickel Rash     Recent Labs   Lab Test 04/18/25  1407 04/10/25  0241 11/10/24  1209 05/06/24  0000 09/24/23  2132 04/11/23  0000 10/23/21  0141 07/27/21  0000 05/10/21  1412 01/10/21  1016 09/28/20  1512 11/15/18  0840 10/21/18  0850   A1C  --   --   --   --   --   --   --   --  5.1  --   --   --   --    LDL  --   --   --  147*  --  168*  --  150*  --   --   --    < >  --    HDL  --   --   --  100  --  94  --  89  --   --   --    < >  --    TRIG  --   --   --  80  --  72  --  94  --   --   --    < >  --    ALT  --  23 18  --   --   --   --   --   --   --   --   --  29   CR 0.82 0.83 0.76 0.86   < > 0.81   < > 0.77  --  0.61  --    < > 0.74   GFRESTIMATED 78 77 86  --    < >  --    < >  --   --  >90  --   --  80   GFRESTBLACK  --   --   --   --   --   --   --   --   --  >90  --   --  >90   POTASSIUM 4.0 4.0 4.1 4.41   < > 4.53   < > 4.30  --  3.6  --    < > 3.4   TSH  --   --   --   --   --   --   --   --   --   --  1.83  --   --     < > = values in this interval not displayed.        FHS-7:       1/27/2022     9:35 AM 2/20/2023     2:26 PM 5/10/2024    10:08 AM 5/12/2025    10:15 AM   Breast CA Risk Assessment (FHS-7)   Did any of your first-degree relatives have breast or ovarian cancer? No No No No   Did any of your relatives have bilateral breast cancer? No No No    Did any man in your family have breast cancer? No No No    Did any woman in your family have breast and ovarian cancer? No No No    Did any  woman in your family have breast cancer before age 50 y? No No No    Do you have 2 or more relatives with breast and/or ovarian cancer? No No No    Do you have 2 or more relatives with breast and/or bowel cancer? No No No        Mammogram Screening - Mammogram every 1-2 years updated in Health Maintenance based on mutual decision making  Pertinent mammograms are reviewed under the imaging tab.    Pertinent mammograms are reviewed under the imaging tab.  History of abnormal Pap smear: No - age 65 or older with adequate negative prior screening test results (3 consecutive negative cytology results, 2 consecutive negative cotesting results, or 2 consecutive negative HrHPV test results within 10 years, with the most recent test occurring within the recommended screening interval for the test used)     Reviewed and updated as needed this visit by clinical staff   Tobacco  Allergies  Meds  Problems  Med Hx  Surg Hx           Reviewed and updated as needed this visit by Provider                  Past Medical History:   Diagnosis Date    Family history of diabetes mellitus     Mother and Father    Gastroesophageal reflux disease     Kidney stones       Past Surgical History:   Procedure Laterality Date    C MAMMOGRAM, SCREENING  2012    COLONOSCOPY  2010    COLONOSCOPY N/A 7/29/2019    Procedure: COLONOSCOPY;  Surgeon: Mike Hill MD;  Location:  GI    COLONOSCOPY N/A 6/10/2024    Procedure: Colonoscopy with polypectomy using biopsy forceps;  Surgeon: Chante Cano MD;  Location:  GI    EYE EXAM ESTABLISHED PT  2013     [facility] ENDOSCOPY DIAG W OR W/O BRUSH/WASH  2001    Dr. Machuca    LAPAROSCOPIC CHOLECYSTECTOMY N/A 9/18/2019    Procedure: CHOLECYSTECTOMY, LAPAROSCOPIC;  Surgeon: Elena Rico MD;  Location:  OR    TEST NOT FOUND  10/2001    Normal GBUS except for liver hemangioma    ZZC ENLARGE BREAST  9/01       ROS:  CONSTITUTIONAL: NEGATIVE for fever, chills, change in  "weight  INTEGUMENTARY/SKIN: NEGATIVE for worrisome rashes, moles or lesions  EYES: NEGATIVE for vision changes or irritation  ENT: NEGATIVE for ear, mouth and throat problems  RESP: NEGATIVE for significant cough or SOB  BREAST: NEGATIVE for masses, tenderness or discharge  CV: NEGATIVE for chest pain, palpitations or peripheral edema  GI: NEGATIVE for nausea, abdominal pain, heartburn, or change in bowel habits  : NEGATIVE for unusual urinary or vaginal symptoms. No vaginal bleeding.  MUSCULOSKELETAL: NEGATIVE for significant arthralgias or myalgia  NEURO: NEGATIVE for weakness, dizziness or paresthesias  PSYCHIATRIC: NEGATIVE for changes in mood or affect     OBJECTIVE:   /76 (BP Location: Right arm, Patient Position: Chair, Cuff Size: Adult Regular)   Pulse 64   Temp 98  F (36.7  C) (Temporal)   Resp 20   Ht 1.626 m (5' 4\")   Wt 62.6 kg (138 lb)   LMP 10/14/2013   BMI 23.69 kg/m    EXAM:  GENERAL: alert and no distress  EYES: Eyes grossly normal to inspection, PERRL and conjunctivae and sclerae normal  HENT: ear canals and TM's normal, nose and mouth without ulcers or lesions  NECK: no adenopathy, no asymmetry, masses, or scars  RESP: lungs clear to auscultation - no rales, rhonchi or wheezes  CV: regular rate and rhythm, normal S1 S2, no S3 or S4, no murmur, click or rub, no peripheral edema  ABDOMEN: soft, nontender, no hepatosplenomegaly, no masses and bowel sounds normal  MS: no gross musculoskeletal defects noted, no edema  SKIN: no suspicious lesions or rashes  NEURO: Normal strength and tone, mentation intact and speech normal  PSYCH: mentation appears normal, affect normal/bright    Diagnostic Test Results:  Labs reviewed in Epic    ASSESSMENT/PLAN:   (Z00.00) Routine general medical examination at a health care facility  (primary encounter diagnosis)  Comment: discussed preventitive healthcare   Plan: Continue to work on healthy diet and exercise, discussed healthy habits     (E78.2) Mixed " hyperlipidemia  Comment: control uncertain  Plan: Lipid Panel ([facility]), VENOUS COLLECTION        Continue to work on healthy diet and exercise, discussed healthy habits     (F41.9) Anxiety  Comment: improved control, did schedule therapy  Plan:     (Z00.00) Encounter for annual wellness exam in Medicare patient  Comment: discussed preventitive healthcare Continue to work on healthy diet and exercise, discussed healthy habits   Personalized Prevention Plan  You are due for the preventive services outlined below.  Your care team is available to assist you in scheduling these services.  If you have already completed any of these items, please share that information with your care team to update in your medical record.  Health Maintenance   Topic Date Due    ZOSTER IMMUNIZATION (1 of 2) Never done    MEDICARE ANNUAL WELLNESS VISIT  05/06/2025    COVID-19 Vaccine (4 - 2024-25 season) 11/11/2025 (Originally 9/1/2024)    INFLUENZA VACCINE (Season Ended) 09/01/2025    LIPID  05/06/2026    MAMMO SCREENING  05/12/2026    FALL RISK ASSESSMENT  05/19/2026    ADVANCE CARE PLANNING  06/01/2027    DIABETES SCREENING  04/18/2028    DEXA  05/21/2029    COLORECTAL CANCER SCREENING  06/10/2029    DTAP/TDAP/TD IMMUNIZATION (3 - Td or Tdap) 05/10/2031    RSV VACCINE (1 - 1-dose 75+ series) 08/23/2033    HEPATITIS C SCREENING  Completed    PHQ-2 (once per calendar year)  Completed    Pneumococcal Vaccine: 50+ Years  Completed    HPV IMMUNIZATION  Aged Out    MENINGITIS IMMUNIZATION  Aged Out    PAP  Discontinued      Plan:     Patient has been advised of split billing requirements and indicates understanding: Yes  COUNSELING:   Reviewed preventive health counseling, as reflected in patient instructions       Regular exercise       Healthy diet/nutrition       Vision screening       Immunizations  Recommend singles           Colorectal Cancer Screening    Estimated body mass index is 23.69 kg/m  as calculated from the following:    Height as  "of this encounter: 1.626 m (5' 4\").    Weight as of this encounter: 62.6 kg (138 lb).        She reports that she has never smoked. She has never been exposed to tobacco smoke. She has never used smokeless tobacco.      Counseling Resources:  ATP IV Guidelines  Pooled Cohorts Equation Calculator  Breast Cancer Risk Calculator  BRCA-Related Cancer Risk Assessment: FHS-7 Tool  FRAX Risk Assessment  ICSI Preventive Guidelines  Dietary Guidelines for Americans, 2010  USDA's MyPlate  ASA Prophylaxis  Lung CA Screening    Mike Beltran MD  [facility]     "

## 2025-05-19 NOTE — NURSING NOTE
Wen Sanchez is here for a CPX and Wellness.    Pre-visit planning  Immunizations -up to date  Colonoscopy -is up to date  Mammogram -is up to date  Asthma test --  PHQ9 -  CLAUDIA 7 -      Questioned patient about current smoking habits.  Pt. has never smoked.  Body mass index is 23.69 kg/m .  PULSE regular  My Chart: active  CLASSIFICATION OF OVERWEIGHT AND OBESITY BY BMI                        Obesity Class           BMI(kg/m2)  Underweight                                    < 18.5  Normal                                         18.5-24.9  Overweight                                     25.0-29.9  OBESITY                     I                  30.0-34.9                             II                 35.0-39.9  EXTREME OBESITY             III                >40                            Patient's  BMI Body mass index is 23.69 kg/m .      The patient has verbalized that it is ok to leave a detailed voice message on the patient's cell phone with results/recommendations from this visit.

## 2025-05-20 ENCOUNTER — RESULTS FOLLOW-UP (OUTPATIENT)
Dept: FAMILY MEDICINE | Facility: CLINIC | Age: 65
End: 2025-05-20

## 2025-05-21 ENCOUNTER — TRANSFERRED RECORDS (OUTPATIENT)
Dept: FAMILY MEDICINE | Facility: CLINIC | Age: 65
End: 2025-05-21

## 2025-06-04 ENCOUNTER — TRANSFERRED RECORDS (OUTPATIENT)
Dept: FAMILY MEDICINE | Facility: CLINIC | Age: 65
End: 2025-06-04

## 2025-06-24 ENCOUNTER — OFFICE VISIT (OUTPATIENT)
Dept: FAMILY MEDICINE | Facility: CLINIC | Age: 65
End: 2025-06-24

## 2025-06-24 VITALS
DIASTOLIC BLOOD PRESSURE: 84 MMHG | SYSTOLIC BLOOD PRESSURE: 130 MMHG | WEIGHT: 138 LBS | TEMPERATURE: 98.1 F | OXYGEN SATURATION: 97 % | BODY MASS INDEX: 23.69 KG/M2 | HEART RATE: 77 BPM

## 2025-06-24 DIAGNOSIS — R09.A2 SENSATION OF LUMP IN THROAT: Primary | ICD-10-CM

## 2025-06-24 PROCEDURE — 99213 OFFICE O/P EST LOW 20 MIN: CPT | Performed by: FAMILY MEDICINE

## 2025-06-24 PROCEDURE — G2211 COMPLEX E/M VISIT ADD ON: HCPCS | Performed by: FAMILY MEDICINE

## 2025-06-24 RX ORDER — RABEPRAZOLE SODIUM 20 MG/1
20 TABLET, DELAYED RELEASE ORAL 2 TIMES DAILY
COMMUNITY

## 2025-06-24 NOTE — NURSING NOTE
Chief Complaint   Patient presents with    Consult     Lump at bottom of throat, sometimes feels like a prickly or tingling/burning pain in the area. Lump started late May 2025 - thought it may have been acid reflux - went to GI and restarted medication which helped for a week but sensation came back after 1 week. Lately has felt like her legs and arms are achy - does not have a lot of energy.      Pre-visit Screening:  Immunizations:  up to date  Colonoscopy:  is up to date  Mammogram: is up to date  Asthma Action Test/Plan:  na  PHQ9:  na  GAD7:  na  Questioned patient about current smoking habits Pt. has never smoked.  Ok to leave detailed message on voice mail for today's visit only yes, phone # [phone] (home)

## 2025-06-24 NOTE — PROGRESS NOTES
SUBJECTIVE:  Wen Sanchez, a 64 year old female scheduled an appointment to discuss the following issues:  Sensation of lump in throat  Pt noting lump in throat sensation for over a month-saw KAROLYN 6/4/25 and it was felt to be likely GERD- pt was started on Aciphex- felt better for a week an then symptoms returned    Pt does note slight difficulty swallowing at times- no regurgitation, no emesis  NO fevers    Pt does  note a bit more fatigue of late    Pt was under stress regarding preparing for [employer] graduation    Pt does have anxiety but feels she does not have as much stress in life now-is on a waiting list to find a therapist    Pt notes voice is hoarse for last few weeks.  No illness. .      Medical, social, surgical, and family histories reviewed.    Patient Active Problem List   Diagnosis    Esophageal reflux    Hypoglycemia    Undiagnosed cardiac murmurs    Calculus of kidney    Encounter for counseling    ACP (advance care planning)    Symptomatic menopausal or female climacteric states       Past Medical History:   Diagnosis Date    Family history of diabetes mellitus     Mother and Father    Gastroesophageal reflux disease     Kidney stones        Family History   Problem Relation Age of Onset    Diabetes Mother     Diabetes Father     Cancer Sister         cervical    Heart Disease No family hx of     Lipids No family hx of     Breast Cancer No family hx of     Cancer - colorectal No family hx of     Colon Cancer No family hx of        Social History     Socioeconomic History    Marital status:      Spouse name: Not on file    Number of children: 3    Years of education: 12    Highest education level: Not on file   Occupational History    Occupation:      Employer: [employer]    Occupation: retired   Tobacco Use    Smoking status: Never     Passive exposure: Never    Smokeless tobacco: Never   Substance and Sexual Activity    Alcohol use: Yes     Alcohol/week: 7.0 standard drinks of  alcohol     Types: 7 Standard drinks or equivalent per week     Comment: one glass wine daily    Drug use: No    Sexual activity: Yes     Partners: Male     Birth control/protection: Pill   Other Topics Concern     Service Not Asked    Blood Transfusions Not Asked    Caffeine Concern Not Asked    Occupational Exposure Not Asked    Hobby Hazards Not Asked    Sleep Concern Not Asked    Stress Concern Not Asked    Weight Concern Not Asked    Special Diet Not Asked    Back Care Not Asked    Exercise Yes    Bike Helmet No    Seat Belt Yes    Self-Exams Yes    Parent/sibling w/ CABG, MI or angioplasty before 65F 55M? Not Asked   Social History Narrative        Functional abiltity:      Hearing imparment:No      Acitvities of daily living:Normal      Risk of falls:No      Home safety of concern:No        Do you exercise?     Yes   Times/week: 2    History of abusive relationships in past:   No    History of abusive relationships currently:    No    Do you feel emotionally and physically safe in your environment?     Yes    Do you own a gun?  No      Is the gun kept in a safe place:   NOT APPLICABLE    Do you wear a seatbelt regularly?     Yes    Do you use sun screen?     Yes         Social Drivers of Health     Financial Resource Strain: Not on file   Food Insecurity: Not on file   Transportation Needs: Not on file   Physical Activity: Not on file   Stress: Not on file   Social Connections: Not on file   Interpersonal Safety: Not on file   Housing Stability: Not on file       Past Surgical History:   Procedure Laterality Date    C MAMMOGRAM, SCREENING  2012    COLONOSCOPY  2010    COLONOSCOPY N/A 7/29/2019    Procedure: COLONOSCOPY;  Surgeon: Mike Hill MD;  Location:  GI    COLONOSCOPY N/A 6/10/2024    Procedure: Colonoscopy with polypectomy using biopsy forceps;  Surgeon: Chante Cano MD;  Location:  GI    EYE EXAM ESTABLISHED PT  2013     [facility] ENDOSCOPY DIAG W OR W/O BRUSH/WASH  2001      [facility]    LAPAROSCOPIC CHOLECYSTECTOMY N/A 9/18/2019    Procedure: CHOLECYSTECTOMY, LAPAROSCOPIC;  Surgeon: Elena Rico MD;  Location: [facility] OR    TEST NOT FOUND  10/2001    Normal GBUS except for liver hemangioma    ZZC ENLARGE BREAST  9/01       Current Outpatient Medications   Medication Sig Dispense Refill    amitriptyline (ELAVIL) 10 MG tablet Take 10 mg by mouth at bedtime.      EPINEPHrine (ANY BX GENERIC EQUIV) 0.3 MG/0.3ML injection 2-pack Inject 0.3 mLs (0.3 mg) into the muscle as needed for anaphylaxis May repeat one time in 5-15 minutes if response to initial dose is inadequate. 2 each 1    estradiol (ESTRACE) 0.1 MG/GM vaginal cream Place 2 g vaginally twice a week. 42.5 g 0    famotidine (PEPCID) 20 MG tablet Take 20 mg by mouth 2 times daily as needed.      fexofenadine (ALLEGRA) 180 MG tablet Take 1 tablet by mouth every 24 hours      LORazepam (ATIVAN) 0.5 MG tablet Take 1 tablet (0.5 mg) by mouth every 6 hours as needed for anxiety. 20 tablet 0    RABEprazole (ACIPHEX) 20 MG EC tablet Take 20 mg by mouth 2 times daily.       No current facility-administered medications for this visit.        Allergies: Food, Sesame oil, Cephalosporins, and Nickel      Immunization History   Administered Date(s) Administered    COVID-19 MONOVALENT 12+ (Pfizer) 03/23/2021, 04/13/2021, 12/04/2021    Influenza (IIV3) PF 10/22/2009    Influenza Vaccine >6 months,quad, PF 11/18/2014, 11/05/2015    Pneumococcal 20 valent Conjugate (Prevnar 20) 05/06/2024    TD,PF 7+ (Tenivac) 01/01/1992, 06/11/2003, 05/10/2021    TDAP Vaccine (Boostrix) 05/18/2011    Td (Adult), Adsorbed 07/05/2001, 06/11/2003        ROS:  CONSTITUTIONAL: NEGATIVE for fever, chills  EYES: NEGATIVE for vision changes   RESP: NEGATIVE for significant cough or SOB  CV: NEGATIVE for chest pain, palpitations   GI: NEGATIVE for nausea, abdominal pain, heartburn, or change in bowel habits  : NEGATIVE for frequency, dysuria, or hematuria  NEURO: NEGATIVE  for weakness, dizziness or paresthesias or headache    OBJECTIVE:  /84 (BP Location: Right arm, Patient Position: Sitting, Cuff Size: Adult Regular)   Pulse 77   Temp 98.1  F (36.7  C) (Temporal)   Wt 62.6 kg (138 lb)   LMP 10/14/2013   SpO2 97%   BMI 23.69 kg/m    EXAM:  GENERAL APPEARANCE: healthy, alert and no distress  EYES: EOMI,  PERRL  HENT: ear canals and TM's normal and nose and mouth without ulcers or lesions  NECK: no adenopathy, no asymmetry, masses, or scars and thyroid normal to palpation  RESP: lungs clear to auscultation - no rales, rhonchi or wheezes  CV: regular rates and rhythm, normal S1 S2, no S3 or S4 and no murmur, click or rub -  ABDOMEN:  soft, nontender, no HSM or masses and bowel sounds normal  PSYCH: mentation appears normal and anxious    ASSESSMENT/PLAN:  (R09.A2) Sensation of lump in throat  (primary encounter diagnosis)  Comment: pt with persistent symptoms and now hoarseness despite 3 weeks Aciphex, suspect some anxiety component, recommend she follow up with [facility] for possible EGd, also concidered ENT referral for nasal scope given hoarseness but pt prefers GI check first  Plan: continue Aciphex, chew food carefully, also recommend she consider further anxiety treatments-therapy is a good start

## 2025-06-26 ENCOUNTER — HOSPITAL ENCOUNTER (EMERGENCY)
Facility: CLINIC | Age: 65
Discharge: HOME | End: 2025-06-26
Attending: EMERGENCY MEDICINE | Admitting: EMERGENCY MEDICINE
Payer: MEDICARE

## 2025-06-26 VITALS
WEIGHT: 142.86 LBS | HEART RATE: 78 BPM | BODY MASS INDEX: 24.52 KG/M2 | TEMPERATURE: 97.2 F | DIASTOLIC BLOOD PRESSURE: 92 MMHG | SYSTOLIC BLOOD PRESSURE: 138 MMHG | OXYGEN SATURATION: 100 % | RESPIRATION RATE: 20 BRPM

## 2025-06-26 DIAGNOSIS — T50.901A OVERDOSE, ACCIDENTAL OR UNINTENTIONAL, INITIAL ENCOUNTER: ICD-10-CM

## 2025-06-26 LAB
ATRIAL RATE - MUSE: 71 BPM
DIASTOLIC BLOOD PRESSURE - MUSE: NORMAL MMHG
INTERPRETATION ECG - MUSE: NORMAL
P AXIS - MUSE: 58 DEGREES
PR INTERVAL - MUSE: 176 MS
QRS DURATION - MUSE: 88 MS
QT - MUSE: 398 MS
QTC - MUSE: 432 MS
R AXIS - MUSE: -7 DEGREES
SYSTOLIC BLOOD PRESSURE - MUSE: NORMAL MMHG
T AXIS - MUSE: 33 DEGREES
VENTRICULAR RATE- MUSE: 71 BPM

## 2025-06-26 PROCEDURE — 93005 ELECTROCARDIOGRAM TRACING: CPT

## 2025-06-26 PROCEDURE — 99283 EMERGENCY DEPT VISIT LOW MDM: CPT

## 2025-06-26 ASSESSMENT — COLUMBIA-SUICIDE SEVERITY RATING SCALE - C-SSRS
6. HAVE YOU EVER DONE ANYTHING, STARTED TO DO ANYTHING, OR PREPARED TO DO ANYTHING TO END YOUR LIFE?: NO
2. HAVE YOU ACTUALLY HAD ANY THOUGHTS OF KILLING YOURSELF IN THE PAST MONTH?: NO
1. IN THE PAST MONTH, HAVE YOU WISHED YOU WERE DEAD OR WISHED YOU COULD GO TO SLEEP AND NOT WAKE UP?: NO

## 2025-06-26 ASSESSMENT — ACTIVITIES OF DAILY LIVING (ADL)
ADLS_ACUITY_SCORE: 41

## 2025-06-26 NOTE — ED TRIAGE NOTES
Pt to ER with c/o accidentally taking one of her husbands Carbidopa- Levodpa . Pt usually takes an antacid

## 2025-06-26 NOTE — ED PROVIDER NOTES
Emergency Department Note      History of Present Illness     Chief Complaint   Medication Reaction      HPI   Wen Sanchez is a 64 year old female     Presenting with her .  Concern of accidental overdose of 1 pill of carbidopa/levodopa 25/100 not extended release formulation around 3:05 AM.    Was getting up to take her antacid medication which she takes at 3 AM in preparation to get up at 4 AM and have coffee and accidentally took one of her husbands pills.    No vomiting since the incident.  No significant lightheadedness dizziness chest pain shortness of breath etc.  No hallucinations, no agitation or jitteriness.    No intentional self-harm.    Independent Historian       Review of External Notes       Past Medical History     Medical History and Problem List   Past Medical History:   Diagnosis Date    Family history of diabetes mellitus     Gastroesophageal reflux disease     Kidney stones        Medications   amitriptyline (ELAVIL) 10 MG tablet  EPINEPHrine (ANY BX GENERIC EQUIV) 0.3 MG/0.3ML injection 2-pack  estradiol (ESTRACE) 0.1 MG/GM vaginal cream  famotidine (PEPCID) 20 MG tablet  fexofenadine (ALLEGRA) 180 MG tablet  LORazepam (ATIVAN) 0.5 MG tablet  RABEprazole (ACIPHEX) 20 MG EC tablet        Surgical History   Past Surgical History:   Procedure Laterality Date    C MAMMOGRAM, SCREENING  2012    COLONOSCOPY  2010    COLONOSCOPY N/A 7/29/2019    Procedure: COLONOSCOPY;  Surgeon: Mike Hill MD;  Location:  GI    COLONOSCOPY N/A 6/10/2024    Procedure: Colonoscopy with polypectomy using biopsy forceps;  Surgeon: Chante Cano MD;  Location:  GI    EYE EXAM ESTABLISHED PT  2013     [facility] ENDOSCOPY DIAG W OR W/O BRUSH/WASH  2001    Dr. Machuca    LAPAROSCOPIC CHOLECYSTECTOMY N/A 9/18/2019    Procedure: CHOLECYSTECTOMY, LAPAROSCOPIC;  Surgeon: Elena Rico MD;  Location:  OR    TEST NOT FOUND  10/2001    Normal GBUS except for liver hemangioma    ZZC ENLARGE  BREAST  9/01       Physical Exam     Patient Vitals for the past 24 hrs:   BP Temp Temp src Pulse Resp SpO2 Weight   06/26/25 0325 (!) 138/92 97.2  F (36.2  C) Temporal 78 20 100 % 64.8 kg (142 lb 13.7 oz)         CV: ppi, regular   Resp: speaking in full sentences without any resp distress  Skin: warm dry well perfused  Neuro: Alert, no gross motor or sensory deficits,  gait stable      HEENT: Pupils 4 mm, no nystagmus          Diagnostics     Lab Results   Labs Ordered and Resulted from Time of ED Arrival to Time of ED Departure - No data to display    Imaging   No orders to display       EKG   ECG results from 06/26/25   EKG 12 lead     Value    Systolic Blood Pressure     Diastolic Blood Pressure     Ventricular Rate 71    Atrial Rate 71    ID Interval 176    QRS Duration 88        QTc 432    P Axis 58    R AXIS -7    T Axis 33    Interpretation ECG      Sinus rhythm  Anterior infarct (cited on or before 12-Nov-2021)  Abnormal ECG  When compared with ECG of 18-Apr-2025 12:32,  Questionable change in initial forces of Anteroseptal leads  T wave inversion now evident in Anterior leads           Independent Interpretation       ED Course      Medications Administered   Medications - No data to display    Procedures   Procedures     Discussion of Management       I spoke with poison control regarding the accidental overdose.  They stated 1 pill is unlikely to cause significant harm.  If she were to develop with symptoms they would likely be mild and consist of nausea/jitteriness and would develop within 2 to 4 hours after taking the medication.    ED Course        Additional Documentation      Medical Decision Making / Diagnosis     CMS Diagnoses:         [facility]   Wen Sanchez is a 64 year old female     Accidental ingestion of 1 pill of carbidopa/levodopa.  I spoke with poison control will observe for 3 to 4 hours after time of ingestion expect potentially mild symptoms including  nausea/jitteriness.  EKG not concerning for malignant arrhythmia.    Assuming no development of concerning symptoms plan okay to discharge home later this morning      Unremarkable period of observation, discharge home, never developed any concerning symptoms.    Disposition   The patient was discharged.     Diagnosis     ICD-10-CM    1. Overdose, accidental or unintentional, initial encounter  T50.901A            Discharge Medications   New Prescriptions    No medications on file         MD Sánchez Montiel, Ez Beckford MD  06/26/25 0558

## (undated) DEVICE — ENDO TROCAR FIRST ENTRY KII FIOS Z-THRD 05X100MM CTF03

## (undated) DEVICE — LINEN FULL SHEET 5511

## (undated) DEVICE — KIT ENDO TURNOVER/PROCEDURE W/CLEAN A SCOPE LINERS 103888

## (undated) DEVICE — ENDO TROCAR BLUNT TIP KII BALLOON 12X100MM C0R47

## (undated) DEVICE — GLOVE PROTEXIS BLUE W/NEU-THERA 6.5  2D73EB65

## (undated) DEVICE — ESU ELEC BLADE 2.75" COATED/INSULATED E1455

## (undated) DEVICE — ENDO FORCEP ENDOJAW BIOPSY 2.8MMX230CM FB-220U

## (undated) DEVICE — ENDO POUCH UNIV RETRIEVAL SYSTEM INZII 10MM CD001

## (undated) DEVICE — CLIP APPLIER ENDO 5MM M/L LIGAMAX EL5ML

## (undated) DEVICE — ENDO TROCAR SLEEVE KII Z-THREADED 05X100MM CTS02

## (undated) DEVICE — ESU PENCIL W/HOLSTER E2350H

## (undated) DEVICE — SU VICRYL 4-0 PS-2 18" UND J496H

## (undated) DEVICE — LINEN HALF SHEET 5512

## (undated) DEVICE — ESU GROUND PAD ADULT W/CORD E7507

## (undated) DEVICE — GLOVE PROTEXIS W/NEU-THERA 6.5  2D73TE65

## (undated) DEVICE — BAG CLEAR TRASH 1.3M 39X33" P4040C

## (undated) DEVICE — LINEN TOWEL PACK X10 5473

## (undated) DEVICE — SOL NACL 0.9% IRRIG 1000ML BOTTLE 07138-09

## (undated) DEVICE — ESU CORD MONOPOLAR 10'  E0510

## (undated) DEVICE — LINEN POUCH DBL 5427

## (undated) DEVICE — SU VICRYL 0 UR-6 27" J603H

## (undated) DEVICE — Device

## (undated) DEVICE — ENDO TROCAR OPTICAL ACCESS KII Z-THRD 12X100MM C0R85

## (undated) RX ORDER — BUPIVACAINE HYDROCHLORIDE 5 MG/ML
INJECTION, SOLUTION EPIDURAL; INTRACAUDAL
Status: DISPENSED
Start: 2019-09-18

## (undated) RX ORDER — LEVOFLOXACIN 5 MG/ML
INJECTION, SOLUTION INTRAVENOUS
Status: DISPENSED
Start: 2019-09-18

## (undated) RX ORDER — FENTANYL CITRATE 50 UG/ML
INJECTION, SOLUTION INTRAMUSCULAR; INTRAVENOUS
Status: DISPENSED
Start: 2024-06-10

## (undated) RX ORDER — FENTANYL CITRATE 50 UG/ML
INJECTION, SOLUTION INTRAMUSCULAR; INTRAVENOUS
Status: DISPENSED
Start: 2019-07-29

## (undated) RX ORDER — PROPOFOL 10 MG/ML
INJECTION, EMULSION INTRAVENOUS
Status: DISPENSED
Start: 2019-09-18

## (undated) RX ORDER — FENTANYL CITRATE 50 UG/ML
INJECTION, SOLUTION INTRAMUSCULAR; INTRAVENOUS
Status: DISPENSED
Start: 2019-09-18

## (undated) RX ORDER — ONDANSETRON 2 MG/ML
INJECTION INTRAMUSCULAR; INTRAVENOUS
Status: DISPENSED
Start: 2019-09-18

## (undated) RX ORDER — ACETAMINOPHEN 325 MG/1
TABLET ORAL
Status: DISPENSED
Start: 2019-09-18

## (undated) RX ORDER — DEXAMETHASONE SODIUM PHOSPHATE 4 MG/ML
INJECTION, SOLUTION INTRA-ARTICULAR; INTRALESIONAL; INTRAMUSCULAR; INTRAVENOUS; SOFT TISSUE
Status: DISPENSED
Start: 2019-09-18

## (undated) RX ORDER — GABAPENTIN 300 MG/1
CAPSULE ORAL
Status: DISPENSED
Start: 2019-09-18

## (undated) RX ORDER — PROMETHAZINE HYDROCHLORIDE 25 MG/ML
INJECTION, SOLUTION INTRAMUSCULAR; INTRAVENOUS
Status: DISPENSED
Start: 2019-09-18

## (undated) RX ORDER — GLYCOPYRROLATE 0.2 MG/ML
INJECTION INTRAMUSCULAR; INTRAVENOUS
Status: DISPENSED
Start: 2019-09-18

## (undated) RX ORDER — LIDOCAINE HYDROCHLORIDE 10 MG/ML
INJECTION, SOLUTION EPIDURAL; INFILTRATION; INTRACAUDAL; PERINEURAL
Status: DISPENSED
Start: 2019-09-18

## (undated) RX ORDER — SIMETHICONE 40MG/0.6ML
SUSPENSION, DROPS(FINAL DOSAGE FORM)(ML) ORAL
Status: DISPENSED
Start: 2024-06-10